# Patient Record
Sex: FEMALE | Race: WHITE | NOT HISPANIC OR LATINO | Employment: PART TIME | URBAN - NONMETROPOLITAN AREA
[De-identification: names, ages, dates, MRNs, and addresses within clinical notes are randomized per-mention and may not be internally consistent; named-entity substitution may affect disease eponyms.]

---

## 2017-03-06 ENCOUNTER — IMAGING SERVICES (OUTPATIENT)
Dept: MAMMOGRAPHY | Age: 47
End: 2017-03-06
Attending: SURGERY

## 2017-03-06 DIAGNOSIS — N63.10 BREAST MASS, RIGHT: ICD-10-CM

## 2017-03-06 PROCEDURE — 77065 DX MAMMO INCL CAD UNI: CPT | Performed by: RADIOLOGY

## 2017-03-14 ENCOUNTER — OFFICE VISIT (OUTPATIENT)
Dept: SURGERY | Age: 47
End: 2017-03-14

## 2017-03-14 VITALS
DIASTOLIC BLOOD PRESSURE: 64 MMHG | WEIGHT: 127.8 LBS | HEIGHT: 60 IN | BODY MASS INDEX: 25.09 KG/M2 | SYSTOLIC BLOOD PRESSURE: 108 MMHG | HEART RATE: 76 BPM | TEMPERATURE: 97.9 F

## 2017-03-14 DIAGNOSIS — N63.10 BREAST MASS, RIGHT: Primary | ICD-10-CM

## 2017-03-14 DIAGNOSIS — Z12.39 SCREENING FOR BREAST CANCER: ICD-10-CM

## 2017-03-14 PROCEDURE — 99212 OFFICE O/P EST SF 10 MIN: CPT | Performed by: SURGERY

## 2017-04-19 ENCOUNTER — TELEPHONE (OUTPATIENT)
Dept: SURGERY | Age: 47
End: 2017-04-19

## 2017-07-03 ENCOUNTER — TELEPHONE (OUTPATIENT)
Dept: SURGERY | Age: 47
End: 2017-07-03

## 2017-09-04 ENCOUNTER — NURSE TRIAGE (OUTPATIENT)
Dept: TELEHEALTH | Age: 47
End: 2017-09-04

## 2017-09-05 ENCOUNTER — HOSPITAL ENCOUNTER (EMERGENCY)
Age: 47
Discharge: HOME | End: 2017-09-05
Attending: EMERGENCY MEDICINE

## 2017-09-05 ENCOUNTER — APPOINTMENT (OUTPATIENT)
Dept: GENERAL RADIOLOGY | Age: 47
End: 2017-09-05
Attending: EMERGENCY MEDICINE

## 2017-09-05 ENCOUNTER — OFF PREMISE CHARGES (OUTPATIENT)
Dept: INTERNAL MEDICINE | Age: 47
End: 2017-09-05

## 2017-09-05 ENCOUNTER — APPOINTMENT (OUTPATIENT)
Dept: CT IMAGING | Age: 47
End: 2017-09-05
Attending: EMERGENCY MEDICINE

## 2017-09-05 VITALS
DIASTOLIC BLOOD PRESSURE: 60 MMHG | WEIGHT: 132.28 LBS | SYSTOLIC BLOOD PRESSURE: 99 MMHG | TEMPERATURE: 98.4 F | HEIGHT: 60 IN | RESPIRATION RATE: 16 BRPM | HEART RATE: 78 BPM | BODY MASS INDEX: 25.97 KG/M2 | OXYGEN SATURATION: 96 %

## 2017-09-05 DIAGNOSIS — R07.9 CHEST PAIN IN ADULT: Primary | ICD-10-CM

## 2017-09-05 DIAGNOSIS — R07.9 CHEST PAIN, UNSPECIFIED TYPE: Primary | ICD-10-CM

## 2017-09-05 DIAGNOSIS — K92.0 HEMATEMESIS WITH NAUSEA: ICD-10-CM

## 2017-09-05 DIAGNOSIS — K29.01 ACUTE GASTRITIS WITH HEMORRHAGE, UNSPECIFIED GASTRITIS TYPE: ICD-10-CM

## 2017-09-05 LAB
ALBUMIN SERPL-MCNC: 4 G/DL (ref 3.6–5.1)
ALBUMIN/GLOB SERPL: 1.2 {RATIO} (ref 1–2.4)
ALP SERPL-CCNC: 84 UNITS/L (ref 45–117)
ALT SERPL-CCNC: 42 UNITS/L
ANION GAP SERPL CALC-SCNC: 12 MMOL/L (ref 10–20)
AST SERPL-CCNC: 22 UNITS/L
BASOPHILS # BLD AUTO: 0 K/MCL (ref 0–0.3)
BASOPHILS NFR BLD AUTO: 0 %
BILIRUB SERPL-MCNC: 0.3 MG/DL (ref 0.2–1)
BUN SERPL-MCNC: 8 MG/DL (ref 6–20)
BUN/CREAT SERPL: 14 (ref 7–25)
CALCIUM SERPL-MCNC: 8.7 MG/DL (ref 8.4–10.2)
CHLORIDE SERPL-SCNC: 101 MMOL/L (ref 98–107)
CO2 SERPL-SCNC: 28 MMOL/L (ref 21–32)
CREAT SERPL-MCNC: 0.59 MG/DL (ref 0.51–0.95)
CRP SERPL-MCNC: <0.3 MG/DL
D DIMER PPP FEU-MCNC: 0.85 MG/L (FEU)
DIFFERENTIAL METHOD BLD: ABNORMAL
EOSINOPHIL # BLD AUTO: 0.1 K/MCL (ref 0.1–0.5)
EOSINOPHIL NFR SPEC: 1 %
ERYTHROCYTE [DISTWIDTH] IN BLOOD: 14.9 % (ref 11–15)
ERYTHROCYTE [SEDIMENTATION RATE] IN BLOOD: 31 MM/HR (ref 0–20)
GLOBULIN SER-MCNC: 3.3 G/DL (ref 2–4)
GLUCOSE SERPL-MCNC: 88 MG/DL (ref 65–99)
HCT VFR BLD CALC: 35.2 % (ref 36–46.5)
HGB BLD-MCNC: 12 G/DL (ref 12–15.5)
INR PPP: 1
LIPASE SERPL-CCNC: 291 UNITS/L (ref 73–393)
LYMPHOCYTES # BLD MANUAL: 1.6 K/MCL (ref 1–4.8)
LYMPHOCYTES NFR BLD MANUAL: 19 %
MCH RBC QN AUTO: 30.9 PG (ref 26–34)
MCHC RBC AUTO-ENTMCNC: 34.1 G/DL (ref 32–36.5)
MCV RBC AUTO: 90.7 FL (ref 78–100)
MONOCYTES # BLD MANUAL: 0.5 K/MCL (ref 0.3–0.9)
MONOCYTES NFR BLD MANUAL: 6 %
NEUTROPHILS # BLD: 6.1 K/MCL (ref 1.8–7.7)
NEUTROPHILS NFR BLD AUTO: 74 %
PLATELET # BLD: 314 K/MCL (ref 140–450)
POTASSIUM SERPL-SCNC: 3.8 MMOL/L (ref 3.4–5.1)
PROT SERPL-MCNC: 7.3 G/DL (ref 6.4–8.2)
PROTHROMBIN TIME: 10.7 SEC (ref 9.7–11.8)
RBC # BLD: 3.88 MIL/MCL (ref 4–5.2)
SODIUM SERPL-SCNC: 137 MMOL/L (ref 135–145)
TROPONIN I SERPL-MCNC: <0.02 NG/ML
WBC # BLD: 8.2 K/MCL (ref 4.2–11)

## 2017-09-05 PROCEDURE — 99285 EMERGENCY DEPT VISIT HI MDM: CPT | Performed by: EMERGENCY MEDICINE

## 2017-09-05 PROCEDURE — 85379 FIBRIN DEGRADATION QUANT: CPT

## 2017-09-05 PROCEDURE — 10002800 HB RX 250 W HCPCS: Performed by: EMERGENCY MEDICINE

## 2017-09-05 PROCEDURE — 10004651 HB RX, NO CHARGE ITEM: Performed by: EMERGENCY MEDICINE

## 2017-09-05 PROCEDURE — 83690 ASSAY OF LIPASE: CPT

## 2017-09-05 PROCEDURE — 85025 COMPLETE CBC W/AUTO DIFF WBC: CPT

## 2017-09-05 PROCEDURE — 71275 CT ANGIOGRAPHY CHEST: CPT | Performed by: RADIOLOGY

## 2017-09-05 PROCEDURE — 96376 TX/PRO/DX INJ SAME DRUG ADON: CPT

## 2017-09-05 PROCEDURE — 85610 PROTHROMBIN TIME: CPT

## 2017-09-05 PROCEDURE — 36415 COLL VENOUS BLD VENIPUNCTURE: CPT

## 2017-09-05 PROCEDURE — 80053 COMPREHEN METABOLIC PANEL: CPT

## 2017-09-05 PROCEDURE — 10002807 HB RX 258: Performed by: EMERGENCY MEDICINE

## 2017-09-05 PROCEDURE — 84484 ASSAY OF TROPONIN QUANT: CPT

## 2017-09-05 PROCEDURE — C9113 INJ PANTOPRAZOLE SODIUM, VIA: HCPCS | Performed by: EMERGENCY MEDICINE

## 2017-09-05 PROCEDURE — 93010 ELECTROCARDIOGRAM REPORT: CPT | Performed by: INTERNAL MEDICINE

## 2017-09-05 PROCEDURE — 99285 EMERGENCY DEPT VISIT HI MDM: CPT

## 2017-09-05 PROCEDURE — 10002803 HB RX 637: Performed by: EMERGENCY MEDICINE

## 2017-09-05 PROCEDURE — 93005 ELECTROCARDIOGRAM TRACING: CPT | Performed by: EMERGENCY MEDICINE

## 2017-09-05 PROCEDURE — 10002801 HB RX 250 W/O HCPCS: Performed by: EMERGENCY MEDICINE

## 2017-09-05 PROCEDURE — 85652 RBC SED RATE AUTOMATED: CPT

## 2017-09-05 PROCEDURE — 71275 CT ANGIOGRAPHY CHEST: CPT

## 2017-09-05 PROCEDURE — 10002805 HB CONTRAST AGENT: Performed by: EMERGENCY MEDICINE

## 2017-09-05 PROCEDURE — 96375 TX/PRO/DX INJ NEW DRUG ADDON: CPT

## 2017-09-05 PROCEDURE — 86140 C-REACTIVE PROTEIN: CPT

## 2017-09-05 RX ORDER — MAGNESIUM HYDROXIDE/ALUMINUM HYDROXICE/SIMETHICONE 120; 1200; 1200 MG/30ML; MG/30ML; MG/30ML
30 SUSPENSION ORAL ONCE
Status: COMPLETED | OUTPATIENT
Start: 2017-09-05 | End: 2017-09-05

## 2017-09-05 RX ORDER — ONDANSETRON 2 MG/ML
4 INJECTION INTRAMUSCULAR; INTRAVENOUS ONCE
Status: COMPLETED | OUTPATIENT
Start: 2017-09-05 | End: 2017-09-05

## 2017-09-05 RX ORDER — PANTOPRAZOLE SODIUM 40 MG/1
40 TABLET, DELAYED RELEASE ORAL DAILY
Qty: 30 TABLET | Refills: 0 | Status: SHIPPED | OUTPATIENT
Start: 2017-09-05 | End: 2021-02-10 | Stop reason: ALTCHOICE

## 2017-09-05 RX ORDER — PANTOPRAZOLE SODIUM 40 MG/10ML
40 INJECTION, POWDER, LYOPHILIZED, FOR SOLUTION INTRAVENOUS ONCE
Status: COMPLETED | OUTPATIENT
Start: 2017-09-05 | End: 2017-09-05

## 2017-09-05 RX ORDER — ONDANSETRON 4 MG/1
4 TABLET, ORALLY DISINTEGRATING ORAL EVERY 4 HOURS PRN
Qty: 10 TABLET | Refills: 0 | Status: SHIPPED | OUTPATIENT
Start: 2017-09-05 | End: 2021-02-10 | Stop reason: ALTCHOICE

## 2017-09-05 RX ORDER — LORAZEPAM 2 MG/ML
0.5 INJECTION INTRAMUSCULAR ONCE
Status: COMPLETED | OUTPATIENT
Start: 2017-09-05 | End: 2017-09-05

## 2017-09-05 RX ORDER — HYDROCODONE BITARTRATE AND ACETAMINOPHEN 5; 325 MG/1; MG/1
1-2 TABLET ORAL EVERY 4 HOURS PRN
Qty: 12 TABLET | Refills: 0 | Status: SHIPPED | OUTPATIENT
Start: 2017-09-05 | End: 2021-02-10 | Stop reason: ALTCHOICE

## 2017-09-05 RX ADMIN — PANTOPRAZOLE SODIUM 40 MG: 40 INJECTION, POWDER, FOR SOLUTION INTRAVENOUS at 09:47

## 2017-09-05 RX ADMIN — MORPHINE SULFATE 2 MG: 2 INJECTION, SOLUTION INTRAMUSCULAR; INTRAVENOUS at 14:09

## 2017-09-05 RX ADMIN — IOPAMIDOL 75 ML: 755 INJECTION, SOLUTION INTRAVENOUS at 10:19

## 2017-09-05 RX ADMIN — LIDOCAINE HYDROCHLORIDE 20 ML: 20 SOLUTION ORAL; TOPICAL at 09:53

## 2017-09-05 RX ADMIN — LORAZEPAM 0.5 MG: 2 INJECTION INTRAMUSCULAR; INTRAVENOUS at 11:49

## 2017-09-05 RX ADMIN — MORPHINE SULFATE 4 MG: 4 INJECTION, SOLUTION INTRAMUSCULAR; INTRAVENOUS at 11:47

## 2017-09-05 RX ADMIN — ALUMINUM HYDROXIDE, MAGNESIUM HYDROXIDE, AND SIMETHICONE 30 ML: 200; 200; 20 SUSPENSION ORAL at 09:49

## 2017-09-05 RX ADMIN — SODIUM CHLORIDE 1000 ML: 9 INJECTION, SOLUTION INTRAVENOUS at 10:50

## 2017-09-05 RX ADMIN — ONDANSETRON 4 MG: 2 SOLUTION INTRAMUSCULAR; INTRAVENOUS at 09:45

## 2017-09-05 RX ADMIN — SODIUM CHLORIDE 10 ML: 9 INJECTION INTRAMUSCULAR; INTRAVENOUS; SUBCUTANEOUS at 10:34

## 2017-09-05 RX ADMIN — SODIUM CHLORIDE 50 ML: 9 INJECTION, SOLUTION INTRAVENOUS at 10:19

## 2017-09-05 ASSESSMENT — ENCOUNTER SYMPTOMS
BACK PAIN: 0
SHORTNESS OF BREATH: 1
NAUSEA: 1
DIZZINESS: 0
DIARRHEA: 0
ACTIVITY CHANGE: 0
CONFUSION: 0
ROS GI COMMENTS: HEMATEMESIS
FATIGUE: 0
WEAKNESS: 0
SORE THROAT: 0
FEVER: 0
EYE PAIN: 0
SPEECH DIFFICULTY: 0
APPETITE CHANGE: 0
HEADACHES: 0
CHEST TIGHTNESS: 1
EYE REDNESS: 0
WOUND: 0
COUGH: 0
CHILLS: 0
ABDOMINAL PAIN: 0
VOMITING: 1

## 2017-09-05 ASSESSMENT — HEART SCORE
TROPONIN: EQUAL OR LESS THAN NORMAL LIMIT
HEART SCORE: 1
AGE: GREATER THAN 45 TO LESS THAN 65
HISTORY: SLIGHTLY SUSPICIOUS
EKG: NORMAL
RISK FACTORS: NO RISK FACTORS KNOWN

## 2017-09-05 ASSESSMENT — PAIN SCALES - GENERAL
PAINLEVEL_OUTOF10: 5
PAINLEVEL_OUTOF10: 4

## 2017-09-06 ENCOUNTER — PREP FOR CASE (OUTPATIENT)
Dept: GASTROENTEROLOGY | Age: 47
End: 2017-09-06

## 2017-09-06 DIAGNOSIS — K29.01 ACUTE GASTRITIS WITH HEMORRHAGE, UNSPECIFIED GASTRITIS TYPE: Primary | ICD-10-CM

## 2017-09-06 LAB
ATRIAL RATE (BPM): 76
P AXIS (DEGREES): 67
PR-INTERVAL (MSEC): 144
QRS-INTERVAL (MSEC): 82
QT-INTERVAL (MSEC): 372
QTC: 418
R AXIS (DEGREES): 30
REPORT TEXT: NORMAL
T AXIS (DEGREES): 57
VENTRICULAR RATE EKG/MIN (BPM): 76

## 2017-09-07 ENCOUNTER — PREP FOR CASE (OUTPATIENT)
Dept: GASTROENTEROLOGY | Age: 47
End: 2017-09-07

## 2017-09-07 ENCOUNTER — TELEPHONE (OUTPATIENT)
Dept: FAMILY MEDICINE | Age: 47
End: 2017-09-07

## 2017-09-07 DIAGNOSIS — K29.00 ACUTE GASTRITIS WITHOUT HEMORRHAGE, UNSPECIFIED GASTRITIS TYPE: Primary | ICD-10-CM

## 2017-09-07 RX ORDER — CALCIUM CARBONATE 500 MG/1
1 TABLET, CHEWABLE ORAL DAILY
COMMUNITY
End: 2021-02-10 | Stop reason: ALTCHOICE

## 2017-09-07 RX ORDER — 0.9 % SODIUM CHLORIDE 0.9 %
2 VIAL (ML) INJECTION PRN
Status: CANCELLED | OUTPATIENT
Start: 2017-09-07

## 2017-09-07 RX ORDER — 0.9 % SODIUM CHLORIDE 0.9 %
2 VIAL (ML) INJECTION EVERY 12 HOURS SCHEDULED
Status: CANCELLED | OUTPATIENT
Start: 2017-09-07

## 2017-09-07 RX ORDER — SODIUM CHLORIDE 9 MG/ML
INJECTION, SOLUTION INTRAVENOUS CONTINUOUS
Status: CANCELLED | OUTPATIENT
Start: 2017-09-07

## 2017-09-07 ASSESSMENT — ACTIVITIES OF DAILY LIVING (ADL)
RECENT_DECLINE_ADL: NO
HISTORY OF FALLING IN THE LAST YEAR (PRIOR TO ADMISSION): NO
CHRONIC_PAIN_PRESENT: NO
SENSORY_SUPPORT_DEVICES: CONTACTS;EYEGLASSES
ADL_SCORE: 12
NEEDS_ASSIST: NO
ADL_SHORT_OF_BREATH: NO
ADL_BEFORE_ADMISSION: INDEPENDENT

## 2017-09-07 ASSESSMENT — COGNITIVE AND FUNCTIONAL STATUS - GENERAL
ARE YOU BLIND OR DO YOU HAVE SERIOUS DIFFICULTY SEEING, EVEN WHEN WEARING GLASSES: NO
ARE YOU DEAF OR DO YOU HAVE SERIOUS DIFFICULTY  HEARING: NO

## 2017-09-08 ENCOUNTER — OFF PREMISE (OUTPATIENT)
Dept: GASTROENTEROLOGY | Age: 47
End: 2017-09-08

## 2017-09-08 ENCOUNTER — HOSPITAL ENCOUNTER (OUTPATIENT)
Age: 47
Discharge: HOME | End: 2017-09-08
Attending: INTERNAL MEDICINE | Admitting: INTERNAL MEDICINE

## 2017-09-08 ENCOUNTER — SURGERY (OUTPATIENT)
Age: 47
End: 2017-09-08

## 2017-09-08 VITALS
RESPIRATION RATE: 14 BRPM | TEMPERATURE: 97.4 F | WEIGHT: 130.29 LBS | SYSTOLIC BLOOD PRESSURE: 120 MMHG | HEIGHT: 60 IN | DIASTOLIC BLOOD PRESSURE: 61 MMHG | OXYGEN SATURATION: 100 % | BODY MASS INDEX: 25.58 KG/M2 | HEART RATE: 62 BPM

## 2017-09-08 DIAGNOSIS — K29.00 ACUTE GASTRITIS WITHOUT HEMORRHAGE, UNSPECIFIED GASTRITIS TYPE: ICD-10-CM

## 2017-09-08 DIAGNOSIS — K31.819 GASTRIC AVM: ICD-10-CM

## 2017-09-08 DIAGNOSIS — K92.0 HEMATEMESIS WITH NAUSEA: Primary | ICD-10-CM

## 2017-09-08 PROCEDURE — 10004560 HB COUNTER-EXTENDED RECOVERY PER HOUR

## 2017-09-08 PROCEDURE — 10003057 HB DISPOSABLE INSTRUMENT/SUPPLY 2: Performed by: INTERNAL MEDICINE

## 2017-09-08 PROCEDURE — 10004316 HB COUNTER-PREP

## 2017-09-08 PROCEDURE — 10003436 HB UPPER GI ENDOSCOPY: Performed by: INTERNAL MEDICINE

## 2017-09-08 PROCEDURE — 10002807 HB RX 258: Performed by: INTERNAL MEDICINE

## 2017-09-08 PROCEDURE — 99152 MOD SED SAME PHYS/QHP 5/>YRS: CPT | Performed by: INTERNAL MEDICINE

## 2017-09-08 PROCEDURE — 10004348 HB COUNTER-EVAL PRE-OP

## 2017-09-08 PROCEDURE — 44366 SMALL BOWEL ENDOSCOPY: CPT | Performed by: INTERNAL MEDICINE

## 2017-09-08 PROCEDURE — 10002800 HB RX 250 W HCPCS: Performed by: INTERNAL MEDICINE

## 2017-09-08 RX ORDER — SODIUM CHLORIDE 9 MG/ML
INJECTION, SOLUTION INTRAVENOUS CONTINUOUS
Status: DISCONTINUED | OUTPATIENT
Start: 2017-09-08 | End: 2017-09-08 | Stop reason: HOSPADM

## 2017-09-08 RX ORDER — 0.9 % SODIUM CHLORIDE 0.9 %
2 VIAL (ML) INJECTION EVERY 12 HOURS SCHEDULED
Status: DISCONTINUED | OUTPATIENT
Start: 2017-09-08 | End: 2017-09-08 | Stop reason: HOSPADM

## 2017-09-08 RX ORDER — 0.9 % SODIUM CHLORIDE 0.9 %
2 VIAL (ML) INJECTION PRN
Status: DISCONTINUED | OUTPATIENT
Start: 2017-09-08 | End: 2017-09-08 | Stop reason: HOSPADM

## 2017-09-08 RX ORDER — MIDAZOLAM HYDROCHLORIDE 1 MG/ML
INJECTION, SOLUTION INTRAMUSCULAR; INTRAVENOUS PRN
Status: DISCONTINUED | OUTPATIENT
Start: 2017-09-08 | End: 2017-09-08 | Stop reason: HOSPADM

## 2017-09-08 RX ADMIN — FENTANYL CITRATE 25 MCG: 50 INJECTION INTRAMUSCULAR; INTRAVENOUS at 14:13

## 2017-09-08 RX ADMIN — SODIUM CHLORIDE: 9 INJECTION, SOLUTION INTRAVENOUS at 12:40

## 2017-09-08 RX ADMIN — FENTANYL CITRATE 50 MCG: 50 INJECTION INTRAMUSCULAR; INTRAVENOUS at 14:11

## 2017-09-08 RX ADMIN — FENTANYL CITRATE 25 MCG: 50 INJECTION INTRAMUSCULAR; INTRAVENOUS at 14:15

## 2017-09-08 RX ADMIN — MIDAZOLAM HYDROCHLORIDE 1 MG: 1 INJECTION, SOLUTION INTRAMUSCULAR; INTRAVENOUS at 14:15

## 2017-09-08 RX ADMIN — MIDAZOLAM HYDROCHLORIDE 2 MG: 1 INJECTION, SOLUTION INTRAMUSCULAR; INTRAVENOUS at 14:20

## 2017-09-08 RX ADMIN — MIDAZOLAM HYDROCHLORIDE 2 MG: 1 INJECTION, SOLUTION INTRAMUSCULAR; INTRAVENOUS at 14:13

## 2017-09-08 RX ADMIN — MIDAZOLAM HYDROCHLORIDE 2 MG: 1 INJECTION, SOLUTION INTRAMUSCULAR; INTRAVENOUS at 14:11

## 2017-09-08 ASSESSMENT — PAIN SCALES - GENERAL
PAIN_LEVEL_AT_REST: 0
PAIN_LEVEL_AT_REST: 2
PAIN_LEVEL_AT_REST: 0
PAIN_LEVEL_AT_REST: 3
PAIN_LEVEL_AT_REST: 0

## 2017-09-08 ASSESSMENT — LIFESTYLE VARIABLES: SMOKING_HISTORY: NO

## 2018-02-03 ENCOUNTER — APPOINTMENT (OUTPATIENT)
Dept: MAMMOGRAPHY | Facility: CLINIC | Age: 48
End: 2018-02-03

## 2018-02-27 ENCOUNTER — RESULT REVIEW (OUTPATIENT)
Age: 48
End: 2018-02-27

## 2018-03-10 ENCOUNTER — APPOINTMENT (OUTPATIENT)
Dept: MAMMOGRAPHY | Facility: CLINIC | Age: 48
End: 2018-03-10
Payer: COMMERCIAL

## 2018-03-10 ENCOUNTER — OUTPATIENT (OUTPATIENT)
Dept: OUTPATIENT SERVICES | Facility: HOSPITAL | Age: 48
LOS: 1 days | End: 2018-03-10
Payer: COMMERCIAL

## 2018-03-10 DIAGNOSIS — Z00.8 ENCOUNTER FOR OTHER GENERAL EXAMINATION: ICD-10-CM

## 2018-03-10 PROCEDURE — 77063 BREAST TOMOSYNTHESIS BI: CPT | Mod: 26

## 2018-03-10 PROCEDURE — 77067 SCR MAMMO BI INCL CAD: CPT

## 2018-03-10 PROCEDURE — 77063 BREAST TOMOSYNTHESIS BI: CPT

## 2018-03-10 PROCEDURE — 77067 SCR MAMMO BI INCL CAD: CPT | Mod: 26

## 2018-04-11 ENCOUNTER — OUTPATIENT (OUTPATIENT)
Dept: OUTPATIENT SERVICES | Facility: HOSPITAL | Age: 48
LOS: 1 days | End: 2018-04-11
Payer: COMMERCIAL

## 2018-04-11 ENCOUNTER — APPOINTMENT (OUTPATIENT)
Dept: ULTRASOUND IMAGING | Facility: CLINIC | Age: 48
End: 2018-04-11
Payer: SELF-PAY

## 2018-04-11 DIAGNOSIS — Z00.8 ENCOUNTER FOR OTHER GENERAL EXAMINATION: ICD-10-CM

## 2018-04-11 PROCEDURE — 76641 ULTRASOUND BREAST COMPLETE: CPT

## 2018-04-11 PROCEDURE — 76641 ULTRASOUND BREAST COMPLETE: CPT | Mod: 26,50

## 2018-04-20 ENCOUNTER — RESULT REVIEW (OUTPATIENT)
Age: 48
End: 2018-04-20

## 2018-04-20 ENCOUNTER — OUTPATIENT (OUTPATIENT)
Dept: OUTPATIENT SERVICES | Facility: HOSPITAL | Age: 48
LOS: 1 days | End: 2018-04-20
Payer: COMMERCIAL

## 2018-04-20 ENCOUNTER — APPOINTMENT (OUTPATIENT)
Dept: ULTRASOUND IMAGING | Facility: CLINIC | Age: 48
End: 2018-04-20
Payer: SELF-PAY

## 2018-04-20 DIAGNOSIS — Z00.8 ENCOUNTER FOR OTHER GENERAL EXAMINATION: ICD-10-CM

## 2018-04-20 PROCEDURE — 19083 BX BREAST 1ST LESION US IMAG: CPT | Mod: RT

## 2018-04-20 PROCEDURE — 19083 BX BREAST 1ST LESION US IMAG: CPT

## 2018-04-20 PROCEDURE — 77065 DX MAMMO INCL CAD UNI: CPT

## 2018-04-20 PROCEDURE — 88377 M/PHMTRC ALYS ISHQUANT/SEMIQ: CPT | Mod: 26

## 2018-04-20 PROCEDURE — 77065 DX MAMMO INCL CAD UNI: CPT | Mod: 26,RT

## 2018-04-20 PROCEDURE — 88305 TISSUE EXAM BY PATHOLOGIST: CPT | Mod: 26

## 2018-04-20 PROCEDURE — 88360 TUMOR IMMUNOHISTOCHEM/MANUAL: CPT | Mod: 26

## 2018-04-20 PROCEDURE — A4648: CPT

## 2018-04-20 PROCEDURE — 88342 IMHCHEM/IMCYTCHM 1ST ANTB: CPT | Mod: 26,59

## 2018-04-20 PROCEDURE — 88377 M/PHMTRC ALYS ISHQUANT/SEMIQ: CPT

## 2018-04-23 LAB — SURGICAL PATHOLOGY FINAL REPORT - CH: SIGNIFICANT CHANGE UP

## 2018-05-09 ENCOUNTER — APPOINTMENT (OUTPATIENT)
Dept: SURGICAL ONCOLOGY | Facility: CLINIC | Age: 48
End: 2018-05-09
Payer: SELF-PAY

## 2018-05-09 VITALS
BODY MASS INDEX: 33.66 KG/M2 | OXYGEN SATURATION: 95 % | DIASTOLIC BLOOD PRESSURE: 78 MMHG | HEART RATE: 64 BPM | WEIGHT: 202 LBS | SYSTOLIC BLOOD PRESSURE: 115 MMHG | HEIGHT: 65 IN | RESPIRATION RATE: 16 BRPM

## 2018-05-09 DIAGNOSIS — Z78.9 OTHER SPECIFIED HEALTH STATUS: ICD-10-CM

## 2018-05-09 PROCEDURE — 99245 OFF/OP CONSLTJ NEW/EST HI 55: CPT

## 2018-05-22 ENCOUNTER — OUTPATIENT (OUTPATIENT)
Dept: OUTPATIENT SERVICES | Facility: HOSPITAL | Age: 48
LOS: 1 days | End: 2018-05-22
Payer: SELF-PAY

## 2018-05-22 DIAGNOSIS — C50.911 MALIGNANT NEOPLASM OF UNSPECIFIED SITE OF RIGHT FEMALE BREAST: ICD-10-CM

## 2018-05-22 DIAGNOSIS — I10 ESSENTIAL (PRIMARY) HYPERTENSION: ICD-10-CM

## 2018-05-22 DIAGNOSIS — Z01.818 ENCOUNTER FOR OTHER PREPROCEDURAL EXAMINATION: ICD-10-CM

## 2018-05-22 PROCEDURE — 93010 ELECTROCARDIOGRAM REPORT: CPT | Mod: NC

## 2018-05-23 ENCOUNTER — RESULT REVIEW (OUTPATIENT)
Age: 48
End: 2018-05-23

## 2018-05-23 PROCEDURE — 36415 COLL VENOUS BLD VENIPUNCTURE: CPT

## 2018-05-23 PROCEDURE — G0463: CPT

## 2018-05-23 PROCEDURE — 93005 ELECTROCARDIOGRAM TRACING: CPT

## 2018-05-23 PROCEDURE — 80048 BASIC METABOLIC PNL TOTAL CA: CPT

## 2018-05-23 PROCEDURE — 85027 COMPLETE CBC AUTOMATED: CPT

## 2018-05-31 ENCOUNTER — APPOINTMENT (OUTPATIENT)
Dept: CARDIOLOGY | Facility: HOSPITAL | Age: 48
End: 2018-05-31

## 2018-05-31 ENCOUNTER — FORM ENCOUNTER (OUTPATIENT)
Age: 48
End: 2018-05-31

## 2018-06-01 ENCOUNTER — OUTPATIENT (OUTPATIENT)
Dept: OUTPATIENT SERVICES | Facility: HOSPITAL | Age: 48
LOS: 1 days | End: 2018-06-01
Payer: COMMERCIAL

## 2018-06-01 ENCOUNTER — APPOINTMENT (OUTPATIENT)
Dept: MRI IMAGING | Facility: CLINIC | Age: 48
End: 2018-06-01
Payer: COMMERCIAL

## 2018-06-01 DIAGNOSIS — C50.911 MALIGNANT NEOPLASM OF UNSPECIFIED SITE OF RIGHT FEMALE BREAST: ICD-10-CM

## 2018-06-01 PROCEDURE — A9585: CPT

## 2018-06-01 PROCEDURE — C8908: CPT

## 2018-06-01 PROCEDURE — C8937: CPT

## 2018-06-01 PROCEDURE — 77059 MRI BREAST BILATERAL: CPT | Mod: 26

## 2018-06-01 PROCEDURE — 0159T: CPT | Mod: 26

## 2018-06-05 ENCOUNTER — APPOINTMENT (OUTPATIENT)
Dept: SURGICAL ONCOLOGY | Facility: HOSPITAL | Age: 48
End: 2018-06-05

## 2018-06-07 ENCOUNTER — OUTPATIENT (OUTPATIENT)
Dept: OUTPATIENT SERVICES | Facility: HOSPITAL | Age: 48
LOS: 1 days | End: 2018-06-07
Payer: SELF-PAY

## 2018-06-07 ENCOUNTER — NON-APPOINTMENT (OUTPATIENT)
Age: 48
End: 2018-06-07

## 2018-06-07 ENCOUNTER — APPOINTMENT (OUTPATIENT)
Dept: CARDIOLOGY | Facility: HOSPITAL | Age: 48
End: 2018-06-07

## 2018-06-07 VITALS
HEIGHT: 65 IN | OXYGEN SATURATION: 96 % | WEIGHT: 198 LBS | SYSTOLIC BLOOD PRESSURE: 115 MMHG | HEART RATE: 69 BPM | DIASTOLIC BLOOD PRESSURE: 77 MMHG | BODY MASS INDEX: 32.99 KG/M2

## 2018-06-07 DIAGNOSIS — I25.10 ATHEROSCLEROTIC HEART DISEASE OF NATIVE CORONARY ARTERY WITHOUT ANGINA PECTORIS: ICD-10-CM

## 2018-06-07 DIAGNOSIS — Z01.818 ENCOUNTER FOR OTHER PREPROCEDURAL EXAMINATION: ICD-10-CM

## 2018-06-07 PROCEDURE — 93005 ELECTROCARDIOGRAM TRACING: CPT

## 2018-06-07 PROCEDURE — G0463: CPT

## 2018-07-15 ENCOUNTER — FORM ENCOUNTER (OUTPATIENT)
Age: 48
End: 2018-07-15

## 2018-07-16 ENCOUNTER — APPOINTMENT (OUTPATIENT)
Dept: ULTRASOUND IMAGING | Facility: IMAGING CENTER | Age: 48
End: 2018-07-16
Payer: COMMERCIAL

## 2018-07-16 ENCOUNTER — FORM ENCOUNTER (OUTPATIENT)
Age: 48
End: 2018-07-16

## 2018-07-16 ENCOUNTER — OUTPATIENT (OUTPATIENT)
Dept: OUTPATIENT SERVICES | Facility: HOSPITAL | Age: 48
LOS: 1 days | End: 2018-07-16
Payer: SELF-PAY

## 2018-07-16 VITALS
HEIGHT: 63 IN | OXYGEN SATURATION: 97 % | WEIGHT: 207.68 LBS | RESPIRATION RATE: 14 BRPM | TEMPERATURE: 99 F | SYSTOLIC BLOOD PRESSURE: 122 MMHG | DIASTOLIC BLOOD PRESSURE: 80 MMHG | HEART RATE: 66 BPM

## 2018-07-16 VITALS — WEIGHT: 207.68 LBS | HEIGHT: 63 IN

## 2018-07-16 DIAGNOSIS — Z01.818 ENCOUNTER FOR OTHER PREPROCEDURAL EXAMINATION: ICD-10-CM

## 2018-07-16 DIAGNOSIS — C50.911 MALIGNANT NEOPLASM OF UNSPECIFIED SITE OF RIGHT FEMALE BREAST: ICD-10-CM

## 2018-07-16 DIAGNOSIS — Z98.890 OTHER SPECIFIED POSTPROCEDURAL STATES: Chronic | ICD-10-CM

## 2018-07-16 DIAGNOSIS — R92.8 OTHER ABNORMAL AND INCONCLUSIVE FINDINGS ON DIAGNOSTIC IMAGING OF BREAST: ICD-10-CM

## 2018-07-16 DIAGNOSIS — Z98.51 TUBAL LIGATION STATUS: Chronic | ICD-10-CM

## 2018-07-16 LAB
ALBUMIN SERPL ELPH-MCNC: 3.2 G/DL — LOW (ref 3.3–5)
ALP SERPL-CCNC: 102 U/L — SIGNIFICANT CHANGE UP (ref 40–120)
ALT FLD-CCNC: 14 U/L DA — SIGNIFICANT CHANGE UP (ref 10–45)
ANION GAP SERPL CALC-SCNC: 9 MMOL/L — SIGNIFICANT CHANGE UP (ref 5–17)
AST SERPL-CCNC: 14 U/L — SIGNIFICANT CHANGE UP (ref 10–40)
BILIRUB SERPL-MCNC: 0.3 MG/DL — SIGNIFICANT CHANGE UP (ref 0.2–1.2)
BUN SERPL-MCNC: 13 MG/DL — SIGNIFICANT CHANGE UP (ref 7–23)
CALCIUM SERPL-MCNC: 8.8 MG/DL — SIGNIFICANT CHANGE UP (ref 8.4–10.5)
CHLORIDE SERPL-SCNC: 103 MMOL/L — SIGNIFICANT CHANGE UP (ref 96–108)
CO2 SERPL-SCNC: 27 MMOL/L — SIGNIFICANT CHANGE UP (ref 22–31)
CREAT SERPL-MCNC: 0.62 MG/DL — SIGNIFICANT CHANGE UP (ref 0.5–1.3)
GLUCOSE SERPL-MCNC: 88 MG/DL — SIGNIFICANT CHANGE UP (ref 70–99)
HCT VFR BLD CALC: 49.4 % — HIGH (ref 34.5–45)
HGB BLD-MCNC: 16.3 G/DL — HIGH (ref 11.5–15.5)
MCHC RBC-ENTMCNC: 30.2 PG — SIGNIFICANT CHANGE UP (ref 27–34)
MCHC RBC-ENTMCNC: 32.9 GM/DL — SIGNIFICANT CHANGE UP (ref 32–36)
MCV RBC AUTO: 91.9 FL — SIGNIFICANT CHANGE UP (ref 80–100)
PLATELET # BLD AUTO: 327 K/UL — SIGNIFICANT CHANGE UP (ref 150–400)
POTASSIUM SERPL-MCNC: 4 MMOL/L — SIGNIFICANT CHANGE UP (ref 3.5–5.3)
POTASSIUM SERPL-SCNC: 4 MMOL/L — SIGNIFICANT CHANGE UP (ref 3.5–5.3)
PROT SERPL-MCNC: 7.3 G/DL — SIGNIFICANT CHANGE UP (ref 6–8.3)
RBC # BLD: 5.38 M/UL — HIGH (ref 3.8–5.2)
RBC # FLD: 13.6 % — SIGNIFICANT CHANGE UP (ref 10.3–14.5)
SODIUM SERPL-SCNC: 139 MMOL/L — SIGNIFICANT CHANGE UP (ref 135–145)
WBC # BLD: 19 K/UL — HIGH (ref 3.8–10.5)
WBC # FLD AUTO: 19 K/UL — HIGH (ref 3.8–10.5)

## 2018-07-16 PROCEDURE — G0463: CPT

## 2018-07-16 PROCEDURE — C1739: CPT

## 2018-07-16 PROCEDURE — 85027 COMPLETE CBC AUTOMATED: CPT

## 2018-07-16 PROCEDURE — 19285 PERQ DEV BREAST 1ST US IMAG: CPT | Mod: RT

## 2018-07-16 PROCEDURE — 19285 PERQ DEV BREAST 1ST US IMAG: CPT

## 2018-07-16 PROCEDURE — 80053 COMPREHEN METABOLIC PANEL: CPT

## 2018-07-16 RX ORDER — SODIUM CHLORIDE 9 MG/ML
1000 INJECTION, SOLUTION INTRAVENOUS
Qty: 0 | Refills: 0 | Status: DISCONTINUED | OUTPATIENT
Start: 2018-07-17 | End: 2018-07-17

## 2018-07-16 NOTE — H&P PST ADULT - HISTORY OF PRESENT ILLNESS
46 y/o female Icelandic speaking female presents to PST.  Diagnosed with right breast cancer few months ago. She was initially scheduled for right breast lumpectomy 2 months ago but surgery was cancelled due to lack of cardiac clearance. Scheduled for right breast lumpectomy post saviscout reflector placement, right axillary sentinel node biopsy on 7/17/18.

## 2018-07-16 NOTE — H&P PST ADULT - ASSESSMENT
48 y/o female Bulgarian speaking female with right breast cancer.  Scheduled for right breast lumpectomy post saviscout reflector placement, right axillary sentinel node biopsy on 7/17/18.

## 2018-07-16 NOTE — H&P PST ADULT - FAMILY HISTORY
Mother  Still living? No  Family history of kidney disease, Age at diagnosis: Age Unknown  Family history of diabetes mellitus, Age at diagnosis: Age Unknown  Family history of hypertension, Age at diagnosis: Age Unknown     Father  Still living? No  Family history of stroke, Age at diagnosis: Age Unknown

## 2018-07-16 NOTE — H&P PST ADULT - PROBLEM SELECTOR PLAN 1
Scheduled for right breast lumpectomy post saviscout reflector placement, right axillary sentinel node biopsy on 7/17/18.    Pre-op test ordered.  Obtain medical and cardiac clearances.  Take Amlodipine, atenolol, pepcid with a sip of water.

## 2018-07-16 NOTE — H&P PST ADULT - NSANTHOSAYNRD_GEN_A_CORE
No. MUNIRA screening performed.  STOP BANG Legend: 0-2 = LOW Risk; 3-4 = INTERMEDIATE Risk; 5-8 = HIGH Risk

## 2018-07-16 NOTE — H&P PST ADULT - PSH
H/O prior ablation treatment  2011  H/O tubal ligation  1997, bilateral  Status post fine needle biopsy  7/16/18

## 2018-07-16 NOTE — H&P PST ADULT - PMH
Bladder prolapse, female, acquired    Essential hypertension    Malignant neoplasm of right female breast, unspecified estrogen receptor status, unspecified site of breast    Type 2 diabetes mellitus without complication, without long-term current use of insulin    Uterine leiomyoma, unspecified location  2011

## 2018-07-17 ENCOUNTER — OUTPATIENT (OUTPATIENT)
Dept: OUTPATIENT SERVICES | Facility: HOSPITAL | Age: 48
LOS: 1 days | End: 2018-07-17
Payer: SELF-PAY

## 2018-07-17 ENCOUNTER — APPOINTMENT (OUTPATIENT)
Dept: SURGICAL ONCOLOGY | Facility: HOSPITAL | Age: 48
End: 2018-07-17

## 2018-07-17 ENCOUNTER — RESULT REVIEW (OUTPATIENT)
Age: 48
End: 2018-07-17

## 2018-07-17 VITALS
SYSTOLIC BLOOD PRESSURE: 105 MMHG | DIASTOLIC BLOOD PRESSURE: 71 MMHG | HEART RATE: 74 BPM | RESPIRATION RATE: 18 BRPM | OXYGEN SATURATION: 95 % | TEMPERATURE: 99 F

## 2018-07-17 VITALS
DIASTOLIC BLOOD PRESSURE: 91 MMHG | SYSTOLIC BLOOD PRESSURE: 136 MMHG | RESPIRATION RATE: 20 BRPM | OXYGEN SATURATION: 96 % | HEART RATE: 66 BPM | WEIGHT: 207.68 LBS | HEIGHT: 63 IN | TEMPERATURE: 98 F

## 2018-07-17 DIAGNOSIS — C50.911 MALIGNANT NEOPLASM OF UNSPECIFIED SITE OF RIGHT FEMALE BREAST: ICD-10-CM

## 2018-07-17 DIAGNOSIS — Z98.890 OTHER SPECIFIED POSTPROCEDURAL STATES: Chronic | ICD-10-CM

## 2018-07-17 DIAGNOSIS — Z98.51 TUBAL LIGATION STATUS: Chronic | ICD-10-CM

## 2018-07-17 LAB
GLUCOSE BLDC GLUCOMTR-MCNC: 102 MG/DL — HIGH (ref 70–99)
GLUCOSE BLDC GLUCOMTR-MCNC: 93 MG/DL — SIGNIFICANT CHANGE UP (ref 70–99)

## 2018-07-17 PROCEDURE — 88305 TISSUE EXAM BY PATHOLOGIST: CPT | Mod: 26

## 2018-07-17 PROCEDURE — 19366: CPT | Mod: 59

## 2018-07-17 PROCEDURE — 38900 IO MAP OF SENT LYMPH NODE: CPT | Mod: 59

## 2018-07-17 PROCEDURE — 38308 INCISION OF LYMPH CHANNELS: CPT

## 2018-07-17 PROCEDURE — 76098 X-RAY EXAM SURGICAL SPECIMEN: CPT

## 2018-07-17 PROCEDURE — 88305 TISSUE EXAM BY PATHOLOGIST: CPT

## 2018-07-17 PROCEDURE — 82962 GLUCOSE BLOOD TEST: CPT

## 2018-07-17 PROCEDURE — 38792 RA TRACER ID OF SENTINL NODE: CPT

## 2018-07-17 PROCEDURE — 76098 X-RAY EXAM SURGICAL SPECIMEN: CPT | Mod: 26

## 2018-07-17 PROCEDURE — 88307 TISSUE EXAM BY PATHOLOGIST: CPT

## 2018-07-17 PROCEDURE — 38525 BIOPSY/REMOVAL LYMPH NODES: CPT | Mod: RT

## 2018-07-17 PROCEDURE — A9541: CPT

## 2018-07-17 PROCEDURE — 38790 INJECT FOR LYMPHATIC X-RAY: CPT | Mod: 59

## 2018-07-17 PROCEDURE — 19302 P-MASTECTOMY W/LN REMOVAL: CPT

## 2018-07-17 PROCEDURE — 88307 TISSUE EXAM BY PATHOLOGIST: CPT | Mod: 26

## 2018-07-17 PROCEDURE — 19301 PARTIAL MASTECTOMY: CPT | Mod: RT

## 2018-07-17 PROCEDURE — C1889: CPT

## 2018-07-17 RX ORDER — SODIUM CHLORIDE 9 MG/ML
1000 INJECTION, SOLUTION INTRAVENOUS
Qty: 0 | Refills: 0 | Status: DISCONTINUED | OUTPATIENT
Start: 2018-07-17 | End: 2018-07-17

## 2018-07-17 RX ORDER — OXYCODONE AND ACETAMINOPHEN 5; 325 MG/1; MG/1
1 TABLET ORAL EVERY 4 HOURS
Qty: 0 | Refills: 0 | Status: DISCONTINUED | OUTPATIENT
Start: 2018-07-17 | End: 2018-07-17

## 2018-07-17 RX ORDER — ONDANSETRON 8 MG/1
4 TABLET, FILM COATED ORAL ONCE
Qty: 0 | Refills: 0 | Status: DISCONTINUED | OUTPATIENT
Start: 2018-07-17 | End: 2018-07-17

## 2018-07-17 RX ORDER — HYDROMORPHONE HYDROCHLORIDE 2 MG/ML
0.5 INJECTION INTRAMUSCULAR; INTRAVENOUS; SUBCUTANEOUS
Qty: 0 | Refills: 0 | Status: DISCONTINUED | OUTPATIENT
Start: 2018-07-17 | End: 2018-07-17

## 2018-07-17 RX ADMIN — SODIUM CHLORIDE 50 MILLILITER(S): 9 INJECTION, SOLUTION INTRAVENOUS at 10:26

## 2018-07-17 RX ADMIN — HYDROMORPHONE HYDROCHLORIDE 0.5 MILLIGRAM(S): 2 INJECTION INTRAMUSCULAR; INTRAVENOUS; SUBCUTANEOUS at 14:35

## 2018-07-17 RX ADMIN — HYDROMORPHONE HYDROCHLORIDE 0.5 MILLIGRAM(S): 2 INJECTION INTRAMUSCULAR; INTRAVENOUS; SUBCUTANEOUS at 14:25

## 2018-07-17 NOTE — BRIEF OPERATIVE NOTE - PROCEDURE
<<-----Click on this checkbox to enter Procedure [place] lymph node biopsy  07/17/2018  right  Active  [facility]  Lumpectomy for malignant neoplasm of breast  07/17/2018  right breast  Active  [facility]

## 2018-07-17 NOTE — ASU DISCHARGE PLAN (ADULT/PEDIATRIC). - SPECIAL INSTRUCTIONS
no heavy lifting  so not touch or pick at incision   shower after 48 hours   discharge home from ASU when meets criteria   prescription for pain medication sent to pharmacy

## 2018-07-23 LAB — SURGICAL PATHOLOGY STUDY: SIGNIFICANT CHANGE UP

## 2018-07-25 PROBLEM — E11.9 TYPE 2 DIABETES MELLITUS WITHOUT COMPLICATIONS: Chronic | Status: ACTIVE | Noted: 2018-07-16

## 2018-07-25 PROBLEM — D25.9 LEIOMYOMA OF UTERUS, UNSPECIFIED: Chronic | Status: ACTIVE | Noted: 2018-07-16

## 2018-07-25 PROBLEM — N81.10 CYSTOCELE, UNSPECIFIED: Chronic | Status: ACTIVE | Noted: 2018-07-16

## 2018-07-25 PROBLEM — I10 ESSENTIAL (PRIMARY) HYPERTENSION: Chronic | Status: ACTIVE | Noted: 2018-07-16

## 2018-08-01 ENCOUNTER — APPOINTMENT (OUTPATIENT)
Dept: SURGICAL ONCOLOGY | Facility: CLINIC | Age: 48
End: 2018-08-01
Payer: SELF-PAY

## 2018-08-01 VITALS
BODY MASS INDEX: 32.99 KG/M2 | WEIGHT: 198 LBS | HEIGHT: 65 IN | DIASTOLIC BLOOD PRESSURE: 82 MMHG | SYSTOLIC BLOOD PRESSURE: 120 MMHG | OXYGEN SATURATION: 96 % | RESPIRATION RATE: 16 BRPM | HEART RATE: 62 BPM

## 2018-08-01 PROCEDURE — 99024 POSTOP FOLLOW-UP VISIT: CPT

## 2018-08-06 ENCOUNTER — OUTPATIENT (OUTPATIENT)
Dept: OUTPATIENT SERVICES | Facility: HOSPITAL | Age: 48
LOS: 1 days | Discharge: HOME | End: 2018-08-06

## 2018-08-06 DIAGNOSIS — Z98.890 OTHER SPECIFIED POSTPROCEDURAL STATES: Chronic | ICD-10-CM

## 2018-08-06 DIAGNOSIS — Z98.51 TUBAL LIGATION STATUS: Chronic | ICD-10-CM

## 2018-08-06 DIAGNOSIS — C50.919 MALIGNANT NEOPLASM OF UNSPECIFIED SITE OF UNSPECIFIED FEMALE BREAST: ICD-10-CM

## 2018-08-15 ENCOUNTER — RESULT REVIEW (OUTPATIENT)
Age: 48
End: 2018-08-15

## 2018-08-20 ENCOUNTER — APPOINTMENT (OUTPATIENT)
Dept: HEMATOLOGY ONCOLOGY | Facility: CLINIC | Age: 48
End: 2018-08-20

## 2018-08-20 VITALS
OXYGEN SATURATION: 99 % | WEIGHT: 206.13 LBS | BODY MASS INDEX: 36.07 KG/M2 | RESPIRATION RATE: 16 BRPM | HEIGHT: 63.23 IN | TEMPERATURE: 98.9 F | SYSTOLIC BLOOD PRESSURE: 142 MMHG | DIASTOLIC BLOOD PRESSURE: 88 MMHG | HEART RATE: 76 BPM

## 2018-08-27 ENCOUNTER — APPOINTMENT (OUTPATIENT)
Dept: HEMATOLOGY ONCOLOGY | Facility: CLINIC | Age: 48
End: 2018-08-27

## 2018-08-27 ENCOUNTER — RESULT REVIEW (OUTPATIENT)
Age: 48
End: 2018-08-27

## 2018-08-27 VITALS
WEIGHT: 206.13 LBS | HEART RATE: 76 BPM | OXYGEN SATURATION: 98 % | RESPIRATION RATE: 16 BRPM | TEMPERATURE: 98.8 F | DIASTOLIC BLOOD PRESSURE: 90 MMHG | BODY MASS INDEX: 36.25 KG/M2 | SYSTOLIC BLOOD PRESSURE: 139 MMHG

## 2018-10-18 ENCOUNTER — OUTPATIENT (OUTPATIENT)
Dept: OUTPATIENT SERVICES | Facility: HOSPITAL | Age: 48
LOS: 1 days | Discharge: HOME | End: 2018-10-18

## 2018-10-18 DIAGNOSIS — Z98.890 OTHER SPECIFIED POSTPROCEDURAL STATES: Chronic | ICD-10-CM

## 2018-10-18 DIAGNOSIS — C50.919 MALIGNANT NEOPLASM OF UNSPECIFIED SITE OF UNSPECIFIED FEMALE BREAST: ICD-10-CM

## 2018-10-18 DIAGNOSIS — Z98.51 TUBAL LIGATION STATUS: Chronic | ICD-10-CM

## 2018-10-25 ENCOUNTER — APPOINTMENT (OUTPATIENT)
Dept: HEMATOLOGY ONCOLOGY | Facility: CLINIC | Age: 48
End: 2018-10-25

## 2018-10-25 VITALS
BODY MASS INDEX: 37.22 KG/M2 | WEIGHT: 211.64 LBS | HEART RATE: 90 BPM | OXYGEN SATURATION: 98 % | TEMPERATURE: 98.1 F | DIASTOLIC BLOOD PRESSURE: 92 MMHG | SYSTOLIC BLOOD PRESSURE: 140 MMHG | RESPIRATION RATE: 16 BRPM

## 2018-11-07 ENCOUNTER — APPOINTMENT (OUTPATIENT)
Dept: SURGICAL ONCOLOGY | Facility: CLINIC | Age: 48
End: 2018-11-07
Payer: COMMERCIAL

## 2018-11-07 VITALS
SYSTOLIC BLOOD PRESSURE: 134 MMHG | HEART RATE: 67 BPM | RESPIRATION RATE: 15 BRPM | BODY MASS INDEX: 37.21 KG/M2 | WEIGHT: 210 LBS | DIASTOLIC BLOOD PRESSURE: 87 MMHG | HEIGHT: 63 IN

## 2018-11-07 PROCEDURE — 99214 OFFICE O/P EST MOD 30 MIN: CPT

## 2018-12-19 ENCOUNTER — OUTPATIENT (OUTPATIENT)
Dept: OUTPATIENT SERVICES | Facility: HOSPITAL | Age: 48
LOS: 1 days | Discharge: HOME | End: 2018-12-19

## 2018-12-19 DIAGNOSIS — C50.919 MALIGNANT NEOPLASM OF UNSPECIFIED SITE OF UNSPECIFIED FEMALE BREAST: ICD-10-CM

## 2018-12-19 DIAGNOSIS — Z98.890 OTHER SPECIFIED POSTPROCEDURAL STATES: Chronic | ICD-10-CM

## 2018-12-19 DIAGNOSIS — Z98.51 TUBAL LIGATION STATUS: Chronic | ICD-10-CM

## 2019-01-01 ENCOUNTER — RX RENEWAL (OUTPATIENT)
Age: 49
End: 2019-01-01

## 2019-01-01 NOTE — ASU PATIENT PROFILE, ADULT - PMH
Idalia Oliva MD  Pediatric Hospitalist  [phone]
Bladder prolapse, female, acquired    Essential hypertension    Malignant neoplasm of right female breast, unspecified estrogen receptor status, unspecified site of breast    Type 2 diabetes mellitus without complication, without long-term current use of insulin    Uterine leiomyoma, unspecified location  2011

## 2019-01-07 ENCOUNTER — APPOINTMENT (OUTPATIENT)
Dept: HEMATOLOGY ONCOLOGY | Facility: CLINIC | Age: 49
End: 2019-01-07

## 2019-01-07 VITALS
DIASTOLIC BLOOD PRESSURE: 91 MMHG | OXYGEN SATURATION: 97 % | BODY MASS INDEX: 37.31 KG/M2 | RESPIRATION RATE: 16 BRPM | TEMPERATURE: 97.4 F | SYSTOLIC BLOOD PRESSURE: 138 MMHG | WEIGHT: 210.63 LBS | HEART RATE: 62 BPM

## 2019-01-07 NOTE — ASSESSMENT
[FreeTextEntry1] : Pt is a 48 yo female w/ pmhx stage 1A ([id number]) invasive ductal carcinoma breast ER/MN +, HER2 neg s/p right lumpectomy and adjuvant radiation. \par \par 1) Ductal Carcinoma of Breast- oncotype has returned at 16. On original study this was considered low risk (<18). In tailorx trial this was considered intermediate and small benefit was noted with chemoendocrine over endocrine therapy alone in age <50 in this group. However, given on low end of intermediate and benefit was small, agreed with patient about pursuing endocrine therapy alone for now. \par - now is s/p adjuvant radiation, treatment completed 12/11/18 \par - tamoxifen ordered to begin immediately; benefits and risks explained to patient once again. Stressed importance of yearly gyn exams - she says this is scheduled in a couple months along with a mammogram \par - genetic testing - 17 gene testing all came back negative. No further testing needed.\par -RTC in 3 months\par \par Discussed with Dr. Hsu. Following longitudinally with Dr. Gibson. \par Laureano Tracy, PGY IV.\par \par

## 2019-01-07 NOTE — PHYSICAL EXAM
[Fully active, able to carry on all pre-disease performance without restriction] : Status 0 - Fully active, able to carry on all pre-disease performance without restriction [Normal] : bilateral breasts without nipple retraction, skin dimpling or palpable masses; the bilateral axillae are without adenopathy [de-identified] : no skin breakdown at radiation site; fibrous hardened tissue felt at surgical site

## 2019-01-07 NOTE — REASON FOR VISIT
[Initial Consultation] : an initial consultation for [Family Member] : family member [FreeTextEntry2] : breast cancer

## 2019-01-07 NOTE — HISTORY OF PRESENT ILLNESS
[Disease:__________________________] : Disease: [unfilled] [de-identified] : Pt is a 46 yo female w/ pmhx HTN and DMII who initially presented with abnormal mammogram and breast mass. US guided core biopsy showed infiltrating ductal carcinoma of 1 cm mass. She was seen by surgical oncology and is now s/p right lumpectomy. Right sentinal node biopsy done as well which was node negative. Margins were negative with surgery. Pathology returned with moderately differentiated ductal carcinoma with Sumeet score 6/9. ER 80-90% positive, [place] 20-25% positive. Her-2 was equivocal with negative FISH. 1.2 cm mass. Stage 1A- [id number]. \par \par Completed Radiation therapy 12/11; four weeks total  [de-identified] : 1A  [de-identified] : moderately differentiated ductal carcinoma  [de-identified] : Pt notes she was able to schedule radiation treatments. Underwent 4 weeks M-F schedule, treatment ended 12/11. She had mild skin breakdown at radiation field underneath breast but now resolved. No other complaints. No fevers, chills, dyspena, or pain. \par

## 2019-03-13 ENCOUNTER — APPOINTMENT (OUTPATIENT)
Dept: SURGICAL ONCOLOGY | Facility: CLINIC | Age: 49
End: 2019-03-13
Payer: COMMERCIAL

## 2019-03-13 VITALS
BODY MASS INDEX: 38.27 KG/M2 | OXYGEN SATURATION: 97 % | HEIGHT: 63 IN | DIASTOLIC BLOOD PRESSURE: 79 MMHG | SYSTOLIC BLOOD PRESSURE: 148 MMHG | RESPIRATION RATE: 15 BRPM | HEART RATE: 75 BPM | WEIGHT: 216 LBS

## 2019-03-13 PROCEDURE — 99215 OFFICE O/P EST HI 40 MIN: CPT

## 2019-03-13 NOTE — ASSESSMENT
[FreeTextEntry1] : Stage I right breast cancer \par JOSE\par S/p lumpectomy and adjuvant radiation therapy\par Continue hormonal tx as per Dr. Gibson\par Will get yearly breast imaging now\par RTO 3 months

## 2019-03-13 NOTE — CONSULT LETTER
[Dear  ___] : Dear  [unfilled], [Please see my note below.] : Please see my note below. [Sincerely,] : Sincerely, [Courtesy Letter:] : I had the pleasure of seeing your patient, [unfilled], in my office today. [DrTracy  ___] : Dr. RAO [FreeTextEntry3] : Luis Mazariegos MD FACS

## 2019-03-13 NOTE — PHYSICAL EXAM
[Normal] : supple, no neck mass and thyroid not enlarged [Normal Neck Lymph Nodes] : normal neck lymph nodes  [Normal Supraclavicular Lymph Nodes] : normal supraclavicular lymph nodes [Normal Groin Lymph Nodes] : normal groin lymph nodes [Normal] : oriented to person, place and time, with appropriate affect [de-identified] : Right breast and axillary scars well healed. Mild postop and postradiation changes. No masses or adenopathy bilaterally.

## 2019-03-13 NOTE — HISTORY OF PRESENT ILLNESS
[de-identified] : 49 y/o female presents for breast cancer follow up. \par She is s/p right breast lumpectomy post Lali  reflector placement, right axillary/sentinel node biopsy on 7/17/18. \par Pathology - T1cN0 moderately differentiated ducal carcinoma with DCIS, sentinel nodes negative for metastatic disease, margins negative.\par ERPR + Gat7rar -\par OncotypeDx: 16\par \par She is s/p adjuvant radiation therapy 12/11/18.\par She is currently on Tamoxifen as per Dr. Gibson, which she is tolerating well.\par \par MMG 3/10/18: Upper central right breast mass with irregular margins and bilateral small circumscribed masses. Patient will be recalled for further evaluation with ultrasound of both breasts. BIRADS-0 \par \par US 4/11/18: Suspicious right breast 1:00 mass measuring 1 cm on imaging US guided core biopsy advised. BIRADS-5\par US Guided Core Biopsy 4/20/18: Right breast 1:00 Infiltrating ductal carcinoma, moderately differentiated. \par \par Denies palpable breast masses, nipple discharge, nipple retraction/inversion, or skin changes. \par Denies any previous breast biopsies.\par Denies breast pain. \par Denies constitutional symptoms. \par Denies fever or chills. \par No family history of breast or ovarian cancer.\par No prior breast biopsies.

## 2019-03-13 NOTE — ADDENDUM
[FreeTextEntry1] : I, Enedina Duckworth, acted solely as a scribe for Dr. Luis Mazariegos on this date 3/13/19.

## 2019-03-25 ENCOUNTER — FORM ENCOUNTER (OUTPATIENT)
Age: 49
End: 2019-03-25

## 2019-03-26 ENCOUNTER — OUTPATIENT (OUTPATIENT)
Dept: OUTPATIENT SERVICES | Facility: HOSPITAL | Age: 49
LOS: 1 days | End: 2019-03-26
Payer: COMMERCIAL

## 2019-03-26 ENCOUNTER — APPOINTMENT (OUTPATIENT)
Dept: MAMMOGRAPHY | Facility: CLINIC | Age: 49
End: 2019-03-26
Payer: COMMERCIAL

## 2019-03-26 ENCOUNTER — APPOINTMENT (OUTPATIENT)
Dept: ULTRASOUND IMAGING | Facility: CLINIC | Age: 49
End: 2019-03-26
Payer: COMMERCIAL

## 2019-03-26 DIAGNOSIS — Z98.890 OTHER SPECIFIED POSTPROCEDURAL STATES: Chronic | ICD-10-CM

## 2019-03-26 DIAGNOSIS — Z00.8 ENCOUNTER FOR OTHER GENERAL EXAMINATION: ICD-10-CM

## 2019-03-26 DIAGNOSIS — Z98.51 TUBAL LIGATION STATUS: Chronic | ICD-10-CM

## 2019-03-26 PROCEDURE — G0279: CPT | Mod: 26

## 2019-03-26 PROCEDURE — 77066 DX MAMMO INCL CAD BI: CPT | Mod: 26

## 2019-03-26 PROCEDURE — 77066 DX MAMMO INCL CAD BI: CPT

## 2019-03-26 PROCEDURE — 76641 ULTRASOUND BREAST COMPLETE: CPT

## 2019-03-26 PROCEDURE — 77062 BREAST TOMOSYNTHESIS BI: CPT | Mod: 26

## 2019-03-26 PROCEDURE — 76641 ULTRASOUND BREAST COMPLETE: CPT | Mod: 26,50

## 2019-03-26 PROCEDURE — G0279: CPT

## 2019-03-29 ENCOUNTER — OUTPATIENT (OUTPATIENT)
Dept: OUTPATIENT SERVICES | Facility: HOSPITAL | Age: 49
LOS: 1 days | Discharge: HOME | End: 2019-03-29

## 2019-03-29 DIAGNOSIS — Z98.890 OTHER SPECIFIED POSTPROCEDURAL STATES: Chronic | ICD-10-CM

## 2019-03-29 DIAGNOSIS — C50.919 MALIGNANT NEOPLASM OF UNSPECIFIED SITE OF UNSPECIFIED FEMALE BREAST: ICD-10-CM

## 2019-03-29 DIAGNOSIS — Z98.51 TUBAL LIGATION STATUS: Chronic | ICD-10-CM

## 2019-04-08 ENCOUNTER — APPOINTMENT (OUTPATIENT)
Dept: HEMATOLOGY ONCOLOGY | Facility: CLINIC | Age: 49
End: 2019-04-08

## 2019-04-08 VITALS
HEART RATE: 68 BPM | OXYGEN SATURATION: 96 % | DIASTOLIC BLOOD PRESSURE: 91 MMHG | RESPIRATION RATE: 16 BRPM | WEIGHT: 216.91 LBS | TEMPERATURE: 98.5 F | SYSTOLIC BLOOD PRESSURE: 150 MMHG | BODY MASS INDEX: 38.43 KG/M2

## 2019-04-08 DIAGNOSIS — Z00.00 ENCOUNTER FOR GENERAL ADULT MEDICAL EXAMINATION W/OUT ABNORMAL FINDINGS: ICD-10-CM

## 2019-04-12 NOTE — HISTORY OF PRESENT ILLNESS
[Disease:__________________________] : Disease: [unfilled] [de-identified] : Pt is a 46 yo female w/ pmhx HTN and DMII who initially presented with abnormal mammogram and breast mass. US guided core biopsy showed infiltrating ductal carcinoma of 1 cm mass. She was seen by surgical oncology and is now s/p right lumpectomy. Right sentinal node biopsy done as well which was node negative. Margins were negative with surgery. Pathology returned with moderately differentiated ductal carcinoma with Sumeet score 6/9. ER 80-90% positive, [place] 20-25% positive. Her-2 was equivocal with negative FISH. 1.2 cm mass. Stage 1A- [id number]. \par \par Completed Radiation therapy 12/11; four weeks total  [de-identified] : 1A  [de-identified] : moderately differentiated ductal carcinoma  [de-identified] : Pt started tamoxifen since last visit. Has had no issues with medication. No joint pains, muscle aches, fatigue, hot flashes, bleeding, swelling, fevers, chills, or headache. Saw obgyn and had mammogram end of March which was wnl. Also saw surgical oncology last month. Requesting refill of tamoxifen. \par

## 2019-04-12 NOTE — PHYSICAL EXAM
[Fully active, able to carry on all pre-disease performance without restriction] : Status 0 - Fully active, able to carry on all pre-disease performance without restriction [Normal] : affect appropriate [de-identified] : no skin breakdown at radiation site; fibrous hardened tissue felt at surgical site

## 2019-04-12 NOTE — ASSESSMENT
[FreeTextEntry1] : Pt is a 46 yo female w/ pmhx stage 1A ([id number]) invasive ductal carcinoma breast ER/RI +, HER2 neg s/p right lumpectomy and adjuvant radiation. Started on tamoxifen January 2019.  \par \par 1) Ductal Carcinoma of Breast- oncotype of 16. On original study this was considered low risk (<18). In tailorx trial this was considered intermediate and small benefit was noted with chemoendocrine over endocrine therapy alone in age <50 in this group. However, given on low end of intermediate and benefit was small, agreed with patient about pursuing endocrine therapy alone for now. \par - s/p adjuvant radiation, treatment completed 12/11/18 \par - pt began tamoxifen 1/2019. Tolerating well without any side effects. \par - Maintaining yearly gyn exams and encouraged routine preventative care with PCP\par - Yearly mammagram: completed 3/2019 wnl \par - genetic testing - 17 gene testing all came back negative. No further testing needed.\par - RTC in 3 months\par \par 2) Health Maintenance- encouraged dexa scan with primary provider for baseline \par \par Discussed with Dr. Gonzalez. Following longitudinally with Dr. Gibson. \par Laureano Tracy, PGY IV\par \par

## 2019-04-12 NOTE — END OF VISIT
[FreeTextEntry3] : 50F Stage IA ER+VA+HER2- breast cancer s/p lumpectomy and SLNB, Oncotype 16, s/p XRT and tamoxifen 1/2019, doing well without AEs. recent gyn eval OK, Mammogram this month OK. Continue tamoxifen, follow months.  [] : Fellow

## 2019-06-12 ENCOUNTER — APPOINTMENT (OUTPATIENT)
Dept: SURGICAL ONCOLOGY | Facility: CLINIC | Age: 49
End: 2019-06-12
Payer: COMMERCIAL

## 2019-06-12 VITALS
TEMPERATURE: 99.2 F | WEIGHT: 217 LBS | SYSTOLIC BLOOD PRESSURE: 114 MMHG | BODY MASS INDEX: 38.45 KG/M2 | HEART RATE: 62 BPM | HEIGHT: 63 IN | DIASTOLIC BLOOD PRESSURE: 79 MMHG | RESPIRATION RATE: 16 BRPM | OXYGEN SATURATION: 93 %

## 2019-06-12 PROCEDURE — 99215 OFFICE O/P EST HI 40 MIN: CPT

## 2019-06-12 NOTE — CONSULT LETTER
[Dear  ___] : Dear  [unfilled], [Sincerely,] : Sincerely, [Please see my note below.] : Please see my note below. [Courtesy Letter:] : I had the pleasure of seeing your patient, [unfilled], in my office today. [DrTracy  ___] : Dr. RAO [FreeTextEntry3] : Luis Mazariegos MD FACS

## 2019-06-12 NOTE — PHYSICAL EXAM
[Normal] : supple, no neck mass and thyroid not enlarged [Normal Neck Lymph Nodes] : normal neck lymph nodes  [Normal Groin Lymph Nodes] : normal groin lymph nodes [Normal Supraclavicular Lymph Nodes] : normal supraclavicular lymph nodes [Normal] : oriented to person, place and time, with appropriate affect [de-identified] : Right breast lumpectomy and axillary scars well healed. Mild postradiation changes. No masses or adenopathy bilaterally.

## 2019-06-12 NOTE — ADDENDUM
[FreeTextEntry1] : I, Enedina Duckworth, acted solely as a scribe for Dr. Luis Mazariegos on this date 06/12/2019.

## 2019-06-12 NOTE — ASSESSMENT
[FreeTextEntry1] : Stage I right breast cancer \par JOSE\par S/p lumpectomy and adjuvant radiation therapy\par Normal physical examination\par BIRADS-2 breast imaging March 2019\par Continue hormonal tx as per Dr. Gibson\par RTO 4 months

## 2019-06-12 NOTE — HISTORY OF PRESENT ILLNESS
[de-identified] : 47 y/o female presents for breast cancer follow up. \par She is s/p right breast lumpectomy post Lali  reflector placement, right axillary/sentinel node biopsy on 7/17/18. \par Pathology - T1cN0 moderately differentiated ducal carcinoma with DCIS, sentinel nodes negative for metastatic disease, margins negative.\par ERPR + Thd0ovh -\par Oncotype Dx: 16\par \par Bilateral MMG/US 3/26/19: multiple bilateral cysts. No evidence of malignancy. BIRADS-2. \par \par She is s/p adjuvant radiation therapy 12/11/18.\par She is currently on Tamoxifen as per Dr. Gibson, which she is tolerating well.\par \par US 4/11/18: Suspicious right breast 1:00 mass measuring 1 cm on imaging US guided core biopsy advised. BIRADS-5\par US Guided Core Biopsy 4/20/18: Right breast 1:00 Infiltrating ductal carcinoma, moderately differentiated. \par \par Denies palpable breast masses, nipple discharge, nipple retraction/inversion, or skin changes. \par Denies any previous breast biopsies.\par Denies breast pain. \par Denies constitutional symptoms. \par Denies fever or chills. \par No family history of breast or ovarian cancer.

## 2019-06-24 ENCOUNTER — RESULT REVIEW (OUTPATIENT)
Age: 49
End: 2019-06-24

## 2019-07-10 ENCOUNTER — OUTPATIENT (OUTPATIENT)
Dept: OUTPATIENT SERVICES | Facility: HOSPITAL | Age: 49
LOS: 1 days | Discharge: HOME | End: 2019-07-10

## 2019-07-10 DIAGNOSIS — C50.919 MALIGNANT NEOPLASM OF UNSPECIFIED SITE OF UNSPECIFIED FEMALE BREAST: ICD-10-CM

## 2019-07-10 DIAGNOSIS — Z98.51 TUBAL LIGATION STATUS: Chronic | ICD-10-CM

## 2019-07-10 DIAGNOSIS — Z98.890 OTHER SPECIFIED POSTPROCEDURAL STATES: Chronic | ICD-10-CM

## 2019-07-12 ENCOUNTER — FORM ENCOUNTER (OUTPATIENT)
Age: 49
End: 2019-07-12

## 2019-07-13 ENCOUNTER — OUTPATIENT (OUTPATIENT)
Dept: OUTPATIENT SERVICES | Facility: HOSPITAL | Age: 49
LOS: 1 days | End: 2019-07-13
Payer: COMMERCIAL

## 2019-07-13 ENCOUNTER — APPOINTMENT (OUTPATIENT)
Dept: ULTRASOUND IMAGING | Facility: CLINIC | Age: 49
End: 2019-07-13
Payer: COMMERCIAL

## 2019-07-13 DIAGNOSIS — E66.01 MORBID (SEVERE) OBESITY DUE TO EXCESS CALORIES: ICD-10-CM

## 2019-07-13 DIAGNOSIS — Z98.51 TUBAL LIGATION STATUS: Chronic | ICD-10-CM

## 2019-07-13 DIAGNOSIS — Z98.890 OTHER SPECIFIED POSTPROCEDURAL STATES: Chronic | ICD-10-CM

## 2019-07-13 DIAGNOSIS — Z85.3 PERSONAL HISTORY OF MALIGNANT NEOPLASM OF BREAST: ICD-10-CM

## 2019-07-13 DIAGNOSIS — Z79.810 LONG TERM (CURRENT) USE OF SELECTIVE ESTROGEN RECEPTOR MODULATORS (SERMS): ICD-10-CM

## 2019-07-13 PROCEDURE — 76830 TRANSVAGINAL US NON-OB: CPT

## 2019-07-13 PROCEDURE — 76856 US EXAM PELVIC COMPLETE: CPT

## 2019-07-13 PROCEDURE — 76856 US EXAM PELVIC COMPLETE: CPT | Mod: 26

## 2019-07-13 PROCEDURE — 76830 TRANSVAGINAL US NON-OB: CPT | Mod: 26

## 2019-07-15 ENCOUNTER — APPOINTMENT (OUTPATIENT)
Dept: HEMATOLOGY ONCOLOGY | Facility: CLINIC | Age: 49
End: 2019-07-15

## 2019-07-15 ENCOUNTER — RESULT REVIEW (OUTPATIENT)
Age: 49
End: 2019-07-15

## 2019-07-15 VITALS
OXYGEN SATURATION: 95 % | WEIGHT: 220.46 LBS | BODY MASS INDEX: 39.05 KG/M2 | HEART RATE: 72 BPM | DIASTOLIC BLOOD PRESSURE: 87 MMHG | TEMPERATURE: 98.4 F | SYSTOLIC BLOOD PRESSURE: 121 MMHG | RESPIRATION RATE: 16 BRPM

## 2019-07-15 LAB
HCT VFR BLD CALC: 46 % — HIGH (ref 34.5–45)
HGB BLD-MCNC: 15.2 G/DL — SIGNIFICANT CHANGE UP (ref 11.5–15.5)
MCHC RBC-ENTMCNC: 30.3 PG — SIGNIFICANT CHANGE UP (ref 27–34)
MCHC RBC-ENTMCNC: 33.1 G/DL — SIGNIFICANT CHANGE UP (ref 32–36)
MCV RBC AUTO: 91.4 FL — SIGNIFICANT CHANGE UP (ref 80–100)
PLATELET # BLD AUTO: 253 K/UL — SIGNIFICANT CHANGE UP (ref 150–400)
RBC # BLD: 5.03 M/UL — SIGNIFICANT CHANGE UP (ref 3.8–5.2)
RBC # FLD: 13.1 % — SIGNIFICANT CHANGE UP (ref 10.3–14.5)
WBC # BLD: 16.8 K/UL — HIGH (ref 3.8–10.5)
WBC # FLD AUTO: 16.8 K/UL — HIGH (ref 3.8–10.5)

## 2019-07-15 NOTE — PHYSICAL EXAM
[Fully active, able to carry on all pre-disease performance without restriction] : Status 0 - Fully active, able to carry on all pre-disease performance without restriction [Normal] : affect appropriate [de-identified] : no skin breakdown at radiation site; fibrous hardened tissue felt at surgical site

## 2019-07-15 NOTE — REASON FOR VISIT
[Follow-Up Visit] : a follow-up visit for [Family Member] : family member [FreeTextEntry2] : breast cancer

## 2019-07-15 NOTE — ASSESSMENT
[FreeTextEntry1] : Pt is a 46 yo female w/ pmhx stage 1A (M5uP6G7) invasive ductal carcinoma breast ER/TX +, HER2 neg s/p right lumpectomy and adjuvant radiation. Started on tamoxifen January 2019.  \par \par 1) Ductal Carcinoma of Breast- oncotype of 16. On original study this was considered low risk (<18). In tailorx trial this was considered intermediate and small benefit was noted with chemoendocrine over endocrine therapy alone in age <50 in this group. However, given on low end of intermediate and benefit was small, agreed with patient about pursuing endocrine therapy alone for now. \par - s/p adjuvant radiation, treatment completed 12/11/18 \par - pt began tamoxifen 1/2019. Tolerating well without any side effects. \par - Maintaining yearly gyn exams and encouraged routine preventative care with PCP\par - Yearly mammogram: completed 3/2019 wnl \par - genetic testing - 17 gene testing all came back negative. No further testing needed.\par \par 2) Amenorrhea \par - pt notes no menstrual periods currently\par - no menopausal symptoms noted\par - will check FSH, LH, and estradiol levels to further evaluate\par - if in fact menopausal, may consider switching to AI in near future \par \par Discussed with Dr. Jones. Following longitudinally with Dr. Gibson. \par Laureano Tracy, PGY V\par \par

## 2019-07-15 NOTE — HISTORY OF PRESENT ILLNESS
[Disease: _____________________] : Disease: [unfilled] [T: ___] : T[unfilled] [N: ___] : N[unfilled] [M: ___] : M[unfilled] [AJCC Stage: ____] : AJCC Stage: [unfilled] [de-identified] : Pt is a 46 yo female w/ pmhx HTN and DMII who initially presented with abnormal mammogram and breast mass. US guided core biopsy showed infiltrating ductal carcinoma of 1 cm mass. She was seen by surgical oncology and is now s/p right lumpectomy. Right sentinal node biopsy done as well which was node negative. Margins were negative with surgery. Pathology returned with moderately differentiated ductal carcinoma with Sumeet score 6/9. ER 80-90% positive, [place] 20-25% positive. Her-2 was equivocal with negative FISH. 1.2 cm mass. Stage 1A- [id number]. \par \par Completed Radiation therapy 12/11; four weeks total  [de-identified] : moderately differentiated ductal carcinoma  [de-identified] : ER 80-90%, [place] 20-25%, HER2 negative  [de-identified] : Pt continues to take tamoxifen daily without any issues. No joint pains, muscle aches, fatigue, hot flashes, bleeding, swelling, fevers, chills, or headache. Pt reports no menstrual cycles since last year. No menopausal symptoms - hot flashes, vaginal dryness, etc. Otherwise, no complaints. \par

## 2019-07-16 LAB
ESTRADIOL FREE SERPL-MCNC: 15 PG/ML — SIGNIFICANT CHANGE UP
FSH SERPL-MCNC: 12.9 IU/L — SIGNIFICANT CHANGE UP
LH SERPL-ACNC: 6.8 IU/L — SIGNIFICANT CHANGE UP

## 2019-10-16 ENCOUNTER — APPOINTMENT (OUTPATIENT)
Dept: SURGICAL ONCOLOGY | Facility: CLINIC | Age: 49
End: 2019-10-16
Payer: COMMERCIAL

## 2019-10-16 VITALS
HEIGHT: 63 IN | OXYGEN SATURATION: 98 % | SYSTOLIC BLOOD PRESSURE: 122 MMHG | WEIGHT: 224 LBS | HEART RATE: 64 BPM | BODY MASS INDEX: 39.69 KG/M2 | DIASTOLIC BLOOD PRESSURE: 77 MMHG

## 2019-10-16 PROCEDURE — 99215 OFFICE O/P EST HI 40 MIN: CPT

## 2019-10-16 NOTE — CONSULT LETTER
[Dear  ___] : Dear  [unfilled], [Courtesy Letter:] : I had the pleasure of seeing your patient, [unfilled], in my office today. [Please see my note below.] : Please see my note below. [Sincerely,] : Sincerely, [DrTracy  ___] : Dr. RAO [FreeTextEntry3] : Luis Mazariegos MD FACS

## 2019-10-16 NOTE — PHYSICAL EXAM
[Normal] : supple, no neck mass and thyroid not enlarged [Normal Neck Lymph Nodes] : normal neck lymph nodes  [Normal Supraclavicular Lymph Nodes] : normal supraclavicular lymph nodes [Normal Groin Lymph Nodes] : normal groin lymph nodes [Normal] : oriented to person, place and time, with appropriate affect [de-identified] : Right breast lumpectomy and axillary scars well healed. Mild post-radiation changes right breast. No masses or adenopathy bilaterally.

## 2019-10-16 NOTE — ASSESSMENT
[FreeTextEntry1] : Stage I right breast cancer \par JOSE\par S/p lumpectomy and adjuvant radiation therapy\par Normal physical examination\par BIRADS-2 breast imaging March 2019\par Continue hormonal tx as per Dr. Gibson\par Continue yearly breast imaging March 2020\par RTO after MMG and US

## 2019-10-16 NOTE — HISTORY OF PRESENT ILLNESS
[de-identified] : 47 y/o female presents for breast cancer follow up. \par She is s/p right breast lumpectomy post Lali  reflector placement, right axillary/sentinel node biopsy on 7/17/18. \par Pathology - T1cN0 moderately differentiated ducal carcinoma with DCIS, sentinel nodes negative for metastatic disease, margins negative.\par ERPR + Tan9wdr -\par Oncotype Dx: 16\par \par She is s/p adjuvant radiation therapy 12/11/18.\par She is currently on Tamoxifen as per Dr. Gibson, which she is tolerating well.\par \par Bilateral MMG/US 3/26/19: multiple bilateral cysts. No evidence of malignancy. BIRADS-2. \par US 4/11/18: Suspicious right breast 1:00 mass measuring 1 cm on imaging US guided core biopsy advised. BIRADS-5\par US Guided Core Biopsy 4/20/18: Right breast 1:00 Infiltrating ductal carcinoma, moderately differentiated. \par \par US Pelvic & Transvaginal (7/13/19): Normal pelvic sonogram.\par \par Denies palpable breast masses, nipple discharge, nipple retraction/inversion, or skin changes. \par Denies any previous breast biopsies.\par Denies breast pain. \par Denies constitutional symptoms. \par Denies fever or chills. \par No family history of breast or ovarian cancer.

## 2019-10-16 NOTE — ADDENDUM
[FreeTextEntry1] : I, Zofia Estse, acted solely as a scribe for Dr. Luis Mazariegos on this date 10/16/2019.

## 2019-10-22 ENCOUNTER — OUTPATIENT (OUTPATIENT)
Dept: OUTPATIENT SERVICES | Facility: HOSPITAL | Age: 49
LOS: 1 days | Discharge: HOME | End: 2019-10-22

## 2019-10-22 DIAGNOSIS — C50.919 MALIGNANT NEOPLASM OF UNSPECIFIED SITE OF UNSPECIFIED FEMALE BREAST: ICD-10-CM

## 2019-10-22 DIAGNOSIS — Z98.51 TUBAL LIGATION STATUS: Chronic | ICD-10-CM

## 2019-10-22 DIAGNOSIS — Z98.890 OTHER SPECIFIED POSTPROCEDURAL STATES: Chronic | ICD-10-CM

## 2019-11-04 ENCOUNTER — OUTPATIENT (OUTPATIENT)
Dept: OUTPATIENT SERVICES | Facility: HOSPITAL | Age: 49
LOS: 1 days | Discharge: HOME | End: 2019-11-04

## 2019-11-04 ENCOUNTER — APPOINTMENT (OUTPATIENT)
Dept: HEMATOLOGY ONCOLOGY | Facility: CLINIC | Age: 49
End: 2019-11-04
Payer: COMMERCIAL

## 2019-11-04 ENCOUNTER — RESULT REVIEW (OUTPATIENT)
Age: 49
End: 2019-11-04

## 2019-11-04 VITALS
DIASTOLIC BLOOD PRESSURE: 81 MMHG | WEIGHT: 225.75 LBS | SYSTOLIC BLOOD PRESSURE: 133 MMHG | TEMPERATURE: 98.7 F | HEART RATE: 77 BPM | BODY MASS INDEX: 39.99 KG/M2 | RESPIRATION RATE: 18 BRPM | OXYGEN SATURATION: 94 %

## 2019-11-04 DIAGNOSIS — Z98.51 TUBAL LIGATION STATUS: Chronic | ICD-10-CM

## 2019-11-04 DIAGNOSIS — C50.919 MALIGNANT NEOPLASM OF UNSPECIFIED SITE OF UNSPECIFIED FEMALE BREAST: ICD-10-CM

## 2019-11-04 DIAGNOSIS — Z98.890 OTHER SPECIFIED POSTPROCEDURAL STATES: Chronic | ICD-10-CM

## 2019-11-04 LAB
BASOPHILS # BLD AUTO: 0.1 K/UL — SIGNIFICANT CHANGE UP (ref 0–0.2)
BASOPHILS NFR BLD AUTO: 0.3 % — SIGNIFICANT CHANGE UP (ref 0–2)
EOSINOPHIL # BLD AUTO: 0.2 K/UL — SIGNIFICANT CHANGE UP (ref 0–0.5)
EOSINOPHIL NFR BLD AUTO: 1 % — SIGNIFICANT CHANGE UP (ref 0–6)
HCT VFR BLD CALC: 46.2 % — HIGH (ref 34.5–45)
HGB BLD-MCNC: 15.9 G/DL — HIGH (ref 11.5–15.5)
LYMPHOCYTES # BLD AUTO: 21 % — SIGNIFICANT CHANGE UP (ref 13–44)
LYMPHOCYTES # BLD AUTO: 3.8 K/UL — HIGH (ref 1–3.3)
MCHC RBC-ENTMCNC: 32.4 PG — SIGNIFICANT CHANGE UP (ref 27–34)
MCHC RBC-ENTMCNC: 34.4 G/DL — SIGNIFICANT CHANGE UP (ref 32–36)
MCV RBC AUTO: 94.1 FL — SIGNIFICANT CHANGE UP (ref 80–100)
MONOCYTES # BLD AUTO: 1.2 K/UL — HIGH (ref 0–0.9)
MONOCYTES NFR BLD AUTO: 6.4 % — SIGNIFICANT CHANGE UP (ref 2–14)
NEUTROPHILS # BLD AUTO: 12.9 K/UL — HIGH (ref 1.8–7.4)
NEUTROPHILS NFR BLD AUTO: 71.3 % — SIGNIFICANT CHANGE UP (ref 43–77)
PLATELET # BLD AUTO: 225 K/UL — SIGNIFICANT CHANGE UP (ref 150–400)
RBC # BLD: 4.91 M/UL — SIGNIFICANT CHANGE UP (ref 3.8–5.2)
RBC # FLD: 12.9 % — SIGNIFICANT CHANGE UP (ref 10.3–14.5)
WBC # BLD: 18.1 K/UL — HIGH (ref 3.8–10.5)
WBC # FLD AUTO: 18.1 K/UL — HIGH (ref 3.8–10.5)

## 2019-11-04 PROCEDURE — 99215 OFFICE O/P EST HI 40 MIN: CPT

## 2019-11-04 NOTE — HISTORY OF PRESENT ILLNESS
[Disease: _____________________] : Disease: [unfilled] [T: ___] : T[unfilled] [N: ___] : N[unfilled] [M: ___] : M[unfilled] [AJCC Stage: ____] : AJCC Stage: [unfilled] [de-identified] : Pt is a 48 yo female w/ pmhx HTN and DMII who initially presented with abnormal right mammogram and breast mass in 3/2018. US guided right breast core biopsy in 4/2018 showed infiltrating ductal carcinoma of 1 cm mass. She was seen by surgical oncology. She underwent right lumpectomy and Right sentinal node biopsy in 7/2018 which revealed 1.2 cm  moderately differentiated ductal carcinoma with Acra score 6/9. ER 80-90% positive, [place] 20-25% positive. Her-2 was equivocal with negative FISH. 1.2 cm mass. 3 sentinel nodes were negative. Stage 1A- [id number]. \par OncotypeDx is 16 \par RT delayed 2/2 insurance issues\par \par Completed Radiation therapy 12/11/18\par tamoxifen started 1/2019 [de-identified] : moderately differentiated ductal carcinoma  [de-identified] : ER 80-90%, [place] 20-25%, HER2 negative  [de-identified] : - genetic testing - 17 gene testing all came back negative. No further testing needed. [FreeTextEntry1] : tamoxifen started 1/2019 [de-identified] : Ms. GIUSEPPE WYNN is here for a follow up visit today for stage I right breast ca dx in 7/2018, s/p RT and on Tamoxifen since 1/2019\par FIRST VISIT WITH ME, TX CARE FROM CLINIC. ENTIRE CHART REVIEWED\par She is tolerating tamoxifen well with tolerable hot flashes. Good compliance. She denies mood changes, vaginal dryness, SOB, chest pain, leg swelling or clotting issues. Her energy and appetite is good, wt stable. Working full time. She has gained 15 lbs since last year. \par LMP date 7/2018,  no intermittent spotting, no vaginal discharge. She hasn’t been using contraception, was reminded to use condoms. Chemically premenopausal 7/2019\par GYN - [facility], doesn’t remember name of GYN. Last seen 4/2019\par Breast imaging: 3/2019\par Opthal:10/2018

## 2019-11-04 NOTE — ASSESSMENT
[FreeTextEntry1] : Ms. GIUSEPPE WYNN is a 49 yo female w/ pmhx stage 1A ([id number]) invasive ductal carcinoma breast ER/NE +, HER2 neg s/p right lumpectomy and adjuvant radiation.  oncotype 16. Started on tamoxifen January 2019.  \par \par - Breast ca: She is tolerating tamoxifen very well without significant side effects. Reports good compliance. Plan to continue tamoxifen for 10 years. Will switch to AI after menopause. GYN and opthal f/u annually due to risk of endometrial ca and cataracts respectively.  Up to date with Breast imaging.\par -  Mild hot flashes: 2/2 tamoxifen.  Advised to wear layers and use fan prn. Consider Effexor if get worse.\par - Wt gain:Life style modifications, healthy diet and exercise d/w her\par - Irregular periods: 2/2 perimenopause. Chemically not in menopause. Annual GYN f/u. She will continue to use non-hormonal contraception. Recehck FSH/estradiol today\par - Patient is aware of signs and symptoms of DVT/PE. Patient will report if she develops acute onset chest pain shortness of breath, leg swelling or calf pain. \par - She has HTN, well controlled. Rec to start ASA for CVA/VTE ppx. \par RTC 6 m.  [Curative] : Goals of care discussed with patient: Curative

## 2019-11-04 NOTE — REASON FOR VISIT
[Follow-Up Visit] : a follow-up [Other: _____] : [unfilled] [[employer] ] : provided by [employer]   [Family Member] : family member [FreeTextEntry1] : [id number] [TWNoteComboBox1] : Finnish [FreeTextEntry2] : breast cancer

## 2019-11-04 NOTE — PHYSICAL EXAM
[Fully active, able to carry on all pre-disease performance without restriction] : Status 0 - Fully active, able to carry on all pre-disease performance without restriction [Normal] : affect appropriate [Obese] : obese [de-identified] : RIGHT BREAST lumpectomy and axillary scar, mild post RT changes

## 2019-11-05 LAB
25(OH)D3 SERPL-MCNC: 26.9 NG/ML
ALBUMIN SERPL ELPH-MCNC: 4 G/DL
ALP BLD-CCNC: 57 U/L
ALT SERPL-CCNC: 14 U/L
ANION GAP SERPL CALC-SCNC: 17 MMOL/L
AST SERPL-CCNC: 14 U/L
BILIRUB SERPL-MCNC: 0.3 MG/DL
BUN SERPL-MCNC: 15 MG/DL
CALCIUM SERPL-MCNC: 9.2 MG/DL
CHLORIDE SERPL-SCNC: 102 MMOL/L
CO2 SERPL-SCNC: 22 MMOL/L
CREAT SERPL-MCNC: 0.66 MG/DL
ESTRADIOL SERPL-MCNC: 6 PG/ML
FSH SERPL-MCNC: 12.1 IU/L
GLUCOSE SERPL-MCNC: 146 MG/DL
POTASSIUM SERPL-SCNC: 3.8 MMOL/L
PROT SERPL-MCNC: 6.4 G/DL
SODIUM SERPL-SCNC: 140 MMOL/L

## 2019-11-29 LAB — CYTOLOGY CVX/VAG DOC THIN PREP: NORMAL

## 2020-05-11 ENCOUNTER — APPOINTMENT (OUTPATIENT)
Dept: HEMATOLOGY ONCOLOGY | Facility: CLINIC | Age: 50
End: 2020-05-11

## 2020-05-12 ENCOUNTER — APPOINTMENT (OUTPATIENT)
Dept: HEMATOLOGY ONCOLOGY | Facility: CLINIC | Age: 50
End: 2020-05-12

## 2020-05-28 ENCOUNTER — OUTPATIENT (OUTPATIENT)
Dept: OUTPATIENT SERVICES | Facility: HOSPITAL | Age: 50
LOS: 1 days | End: 2020-05-28
Payer: COMMERCIAL

## 2020-05-28 ENCOUNTER — APPOINTMENT (OUTPATIENT)
Dept: MAMMOGRAPHY | Facility: CLINIC | Age: 50
End: 2020-05-28
Payer: COMMERCIAL

## 2020-05-28 DIAGNOSIS — Z00.8 ENCOUNTER FOR OTHER GENERAL EXAMINATION: ICD-10-CM

## 2020-05-28 DIAGNOSIS — Z98.890 OTHER SPECIFIED POSTPROCEDURAL STATES: Chronic | ICD-10-CM

## 2020-05-28 DIAGNOSIS — Z98.51 TUBAL LIGATION STATUS: Chronic | ICD-10-CM

## 2020-05-28 PROCEDURE — 77066 DX MAMMO INCL CAD BI: CPT | Mod: 26

## 2020-05-28 PROCEDURE — G0279: CPT

## 2020-05-28 PROCEDURE — G0279: CPT | Mod: 26

## 2020-05-28 PROCEDURE — 77066 DX MAMMO INCL CAD BI: CPT

## 2020-06-01 ENCOUNTER — APPOINTMENT (OUTPATIENT)
Dept: MAMMOGRAPHY | Facility: CLINIC | Age: 50
End: 2020-06-01

## 2020-06-03 ENCOUNTER — APPOINTMENT (OUTPATIENT)
Dept: SURGICAL ONCOLOGY | Facility: CLINIC | Age: 50
End: 2020-06-03
Payer: COMMERCIAL

## 2020-06-03 VITALS
OXYGEN SATURATION: 95 % | WEIGHT: 226 LBS | HEART RATE: 73 BPM | DIASTOLIC BLOOD PRESSURE: 83 MMHG | HEIGHT: 65 IN | RESPIRATION RATE: 18 BRPM | SYSTOLIC BLOOD PRESSURE: 135 MMHG | BODY MASS INDEX: 37.65 KG/M2

## 2020-06-03 PROCEDURE — 99215 OFFICE O/P EST HI 40 MIN: CPT

## 2020-06-03 NOTE — PHYSICAL EXAM
[Normal] : supple, no neck mass and thyroid not enlarged [Normal Neck Lymph Nodes] : normal neck lymph nodes  [Normal Groin Lymph Nodes] : normal groin lymph nodes [Normal Supraclavicular Lymph Nodes] : normal supraclavicular lymph nodes [Normal Axillary Lymph Nodes] : normal axillary lymph nodes [Normal] : grossly intact [de-identified] : Right breas lumpectomy and axillary scars healed, no masses/adenopathy bilat.

## 2020-06-03 NOTE — HISTORY OF PRESENT ILLNESS
[de-identified] : 48 y/o female presents for breast cancer follow up.  used.\par She is s/p right breast lumpectomy post Lali  reflector placement, right axillary/sentinel node biopsy on 7/17/18. 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  She continues medical oncology surveillance with Dr. Li. \par \par Bilateral MMG/US 3/26/19: multiple bilateral cysts. No evidence of malignancy. BIRADS-2.  \par US Pelvic & Transvaginal (7/13/19): Normal pelvic sonogram.\par \par Most recent bilateral mammogram performed in May 2020 demonstrated bilateral probable benign calcifications for which a follow-up diagnostic mammogram in 6 months is advised (BIRADS 3).  No US performed.\par \par \par Denies palpable breast masses, nipple discharge, nipple retraction/inversion, or skin changes. \par Denies any previous breast biopsies.\par Denies breast pain. \par Denies constitutional symptoms. \par Denies fever or chills. \par No family history of breast or ovarian cancer.

## 2020-06-03 NOTE — ASSESSMENT
[FreeTextEntry1] : Stage I right breast cancer \par JOSE\par S/p lumpectomy and adjuvant radiation therapy\par Normal physical examination\par Bilateral BIRADS 3 breast calcifications on recent mammogram\par Diagnostic bilateral mammogram and breast US 11/2020\par Continue hormonal tx as per Dr. Li\par RTO 6 mos

## 2020-06-12 ENCOUNTER — OUTPATIENT (OUTPATIENT)
Dept: OUTPATIENT SERVICES | Facility: HOSPITAL | Age: 50
LOS: 1 days | Discharge: HOME | End: 2020-06-12

## 2020-06-12 DIAGNOSIS — Z98.51 TUBAL LIGATION STATUS: Chronic | ICD-10-CM

## 2020-06-12 DIAGNOSIS — C50.919 MALIGNANT NEOPLASM OF UNSPECIFIED SITE OF UNSPECIFIED FEMALE BREAST: ICD-10-CM

## 2020-06-12 DIAGNOSIS — Z98.890 OTHER SPECIFIED POSTPROCEDURAL STATES: Chronic | ICD-10-CM

## 2020-06-17 ENCOUNTER — RESULT REVIEW (OUTPATIENT)
Age: 50
End: 2020-06-17

## 2020-06-17 ENCOUNTER — APPOINTMENT (OUTPATIENT)
Dept: HEMATOLOGY ONCOLOGY | Facility: CLINIC | Age: 50
End: 2020-06-17
Payer: COMMERCIAL

## 2020-06-17 VITALS
TEMPERATURE: 98.5 F | OXYGEN SATURATION: 95 % | SYSTOLIC BLOOD PRESSURE: 136 MMHG | DIASTOLIC BLOOD PRESSURE: 85 MMHG | BODY MASS INDEX: 37.9 KG/M2 | WEIGHT: 227.74 LBS | RESPIRATION RATE: 17 BRPM | HEART RATE: 85 BPM

## 2020-06-17 LAB
BASOPHILS # BLD AUTO: 0.07 K/UL — SIGNIFICANT CHANGE UP (ref 0–0.2)
BASOPHILS NFR BLD AUTO: 0.3 % — SIGNIFICANT CHANGE UP (ref 0–2)
EOSINOPHIL # BLD AUTO: 0.1 K/UL — SIGNIFICANT CHANGE UP (ref 0–0.5)
EOSINOPHIL NFR BLD AUTO: 0.4 % — SIGNIFICANT CHANGE UP (ref 0–6)
HCT VFR BLD CALC: 43.3 % — SIGNIFICANT CHANGE UP (ref 34.5–45)
HGB BLD-MCNC: 14.2 G/DL — SIGNIFICANT CHANGE UP (ref 11.5–15.5)
IMM GRANULOCYTES NFR BLD AUTO: 3.7 % — HIGH (ref 0–1.5)
LYMPHOCYTES # BLD AUTO: 1.74 K/UL — SIGNIFICANT CHANGE UP (ref 1–3.3)
LYMPHOCYTES # BLD AUTO: 7.5 % — LOW (ref 13–44)
MCHC RBC-ENTMCNC: 30.2 PG — SIGNIFICANT CHANGE UP (ref 27–34)
MCHC RBC-ENTMCNC: 32.8 GM/DL — SIGNIFICANT CHANGE UP (ref 32–36)
MCV RBC AUTO: 92.1 FL — SIGNIFICANT CHANGE UP (ref 80–100)
MONOCYTES # BLD AUTO: 0.66 K/UL — SIGNIFICANT CHANGE UP (ref 0–0.9)
MONOCYTES NFR BLD AUTO: 2.9 % — SIGNIFICANT CHANGE UP (ref 2–14)
NEUTROPHILS # BLD AUTO: 19.64 K/UL — HIGH (ref 1.8–7.4)
NEUTROPHILS NFR BLD AUTO: 85.2 % — HIGH (ref 43–77)
NRBC # BLD: 0 /100 WBCS — SIGNIFICANT CHANGE UP (ref 0–0)
PLATELET # BLD AUTO: 338 K/UL — SIGNIFICANT CHANGE UP (ref 150–400)
RBC # BLD: 4.7 M/UL — SIGNIFICANT CHANGE UP (ref 3.8–5.2)
RBC # FLD: 13.5 % — SIGNIFICANT CHANGE UP (ref 10.3–14.5)
WBC # BLD: 23.06 K/UL — HIGH (ref 3.8–10.5)
WBC # FLD AUTO: 23.06 K/UL — HIGH (ref 3.8–10.5)

## 2020-06-17 PROCEDURE — 99214 OFFICE O/P EST MOD 30 MIN: CPT

## 2020-06-17 NOTE — PHYSICAL EXAM
[Fully active, able to carry on all pre-disease performance without restriction] : Status 0 - Fully active, able to carry on all pre-disease performance without restriction [Obese] : obese [Normal] : affect appropriate [de-identified] : RIGHT BREAST lumpectomy and axillary scar, mild post RT changes

## 2020-06-17 NOTE — HISTORY OF PRESENT ILLNESS
[Disease: _____________________] : Disease: [unfilled] [T: ___] : T[unfilled] [N: ___] : N[unfilled] [M: ___] : M[unfilled] [AJCC Stage: ____] : AJCC Stage: [unfilled] [de-identified] : moderately differentiated ductal carcinoma  [de-identified] : Pt is a 48 yo female w/ pmhx HTN and DMII who initially presented with abnormal right mammogram and breast mass in 3/2018. US guided right breast core biopsy in 4/2018 showed infiltrating ductal carcinoma of 1 cm mass. She was seen by surgical oncology. She underwent right lumpectomy and Right sentinal node biopsy in 7/2018 which revealed 1.2 cm  moderately differentiated ductal carcinoma with [place] score 6/9. ER 80-90% positive, AL 20-25% positive. Her-2 was equivocal with negative FISH. 1.2 cm mass. 3 sentinel nodes were negative. Stage 1A- [id number]. \par OncotypeDx is 16 \par RT delayed 2/2 insurance issues\par \par Completed Radiation therapy 12/11/18\par tamoxifen started 1/2019 [de-identified] : ER 80-90%, MT 20-25%, HER2 negative  [de-identified] : - genetic testing - 17 gene testing all came back negative. No further testing needed. [de-identified] : Ms. GIUSEPPE WYNN is here for a follow up visit today for stage I right breast ca dx in 2018, s/p RT and on Tamoxifen since 2019. Tx care from clinic 2019\par She is tolerating tamoxifen well with tolerable hot flashes. Good compliance. She denies mood changes, vaginal dryness, SOB, chest pain, leg swelling or clotting issues. Her energy and appetite is good. Gained 2 lbs since last visit, still overwt\par LMP date 2018,  no intermittent spotting, no vaginal discharge. She hasn’t been using contraception, was reminded to use condoms. Chemically premenopausal 2019\par GYN - [facility] clinic, doesn’t remember name of GYN. Last seen 2019\par Breast imagin2020, BIRADS 3, 6 m f/u. Follows with Dr Mazariegos\par Opthal:10/2018 [FreeTextEntry1] : tamoxifen started 1/2019

## 2020-06-17 NOTE — REASON FOR VISIT
[Follow-Up Visit] : a follow-up [Other: _____] : [unfilled] [[employer] ] : provided by [employer]   [Family Member] : family member [FreeTextEntry1] : [id number] [TWNoteComboBox1] : Japanese [FreeTextEntry2] : reji

## 2020-06-17 NOTE — OB HISTORY
Constitution: No Fever or chills  Eyes: No visual changes  HEENT: No URI symptoms  Cardio: No Chest pain  Resp: +cough. No SOB  GI: +vomiting. No abdominal pain  : No dysuria  MSK: No Back pain  Neuro: No Headache  Skin: No rashes  All other ROS as per HPI  Adithya Zavala, PGY-1
[Definite:  ___ (Date)] : the last menstrual period was [unfilled]

## 2020-06-17 NOTE — ASSESSMENT
[Curative] : Goals of care discussed with patient: Curative [FreeTextEntry1] : Ms. GIUSEPPE WYNN is a 49 yo female w/ pmhx stage 1A (A6wV2W2) invasive ductal carcinoma breast ER/AR +, HER2 neg s/p right lumpectomy and adjuvant radiation.  oncotype 16. Started on tamoxifen January 2019.  \par \par - Breast ca: She is tolerating tamoxifen very well without significant side effects. Reports good compliance. Plan to continue tamoxifen for 10 years. Will switch to AI after menopause. GYN and opthal f/u annually due to risk of endometrial ca and cataracts respectively.  Up to date with Breast imaging.\par -  Mild hot flashes: 2/2 tamoxifen.  Advised to wear layers and use fan prn. Consider Effexor if get worse.\par - Wt gain:Life style modifications, healthy diet and exercise d/w her\par - Irregular periods: 2/2 perimenopause. Annual GYN f/u. She will continue to use non-hormonal contraception. Recehck FSH/estradiol today\par - Patient is aware of signs and symptoms of DVT/PE. Patient will report if she develops acute onset chest pain shortness of breath, leg swelling or calf pain. \par - She has HTN, well controlled. Rec to start ASA for CVA/VTE ppx. \par RTC 6 m.

## 2020-06-18 ENCOUNTER — APPOINTMENT (OUTPATIENT)
Dept: ORTHOPEDIC SURGERY | Facility: CLINIC | Age: 50
End: 2020-06-18
Payer: COMMERCIAL

## 2020-06-18 VITALS
DIASTOLIC BLOOD PRESSURE: 84 MMHG | BODY MASS INDEX: 37.82 KG/M2 | SYSTOLIC BLOOD PRESSURE: 137 MMHG | WEIGHT: 227 LBS | HEART RATE: 79 BPM | HEIGHT: 65 IN | TEMPERATURE: 98.3 F

## 2020-06-18 LAB
25(OH)D3 SERPL-MCNC: 36.9 NG/ML
ALBUMIN SERPL ELPH-MCNC: 4.3 G/DL
ALP BLD-CCNC: 72 U/L
ALT SERPL-CCNC: 13 U/L
ANION GAP SERPL CALC-SCNC: 17 MMOL/L
AST SERPL-CCNC: 13 U/L
BILIRUB SERPL-MCNC: 0.2 MG/DL
BUN SERPL-MCNC: 15 MG/DL
CALCIUM SERPL-MCNC: 9.8 MG/DL
CHLORIDE SERPL-SCNC: 102 MMOL/L
CO2 SERPL-SCNC: 22 MMOL/L
CREAT SERPL-MCNC: 0.72 MG/DL
ESTRADIOL SERPL-MCNC: 5 PG/ML
FSH SERPL-MCNC: 10.9 IU/L
GLUCOSE SERPL-MCNC: 300 MG/DL
POTASSIUM SERPL-SCNC: 4.3 MMOL/L
PROT SERPL-MCNC: 6.6 G/DL
SODIUM SERPL-SCNC: 141 MMOL/L

## 2020-06-18 PROCEDURE — 73562 X-RAY EXAM OF KNEE 3: CPT | Mod: RT

## 2020-06-18 PROCEDURE — 99204 OFFICE O/P NEW MOD 45 MIN: CPT | Mod: 25

## 2020-06-18 PROCEDURE — 20610 DRAIN/INJ JOINT/BURSA W/O US: CPT | Mod: LT

## 2020-06-18 NOTE — PHYSICAL EXAM
[de-identified] : Bilateral Knee Physical Examination:\par \par General: Alert and oriented x3.  In no acute distress.  Pleasant in nature with a normal affect.  No apparent respiratory distress. \par \par Erythema, Warmth, Rubor: Negative\par Swelling/Edema: No swelling present\par ROM: 0 to 120 degrees\par Reg's Test: Negative \par Medial Joint Line TTP: Positive bilaterally\par Lateral Joint Line TTP: Positive bilaterally\par Lachman Exam/Anterior Drawer/Pivot Shift Test: Negative \par MCL Pain: Negative\par LCL Pain: Negative\par Iliotibial Band Pain: Negative\par Patellofemoral Joint Pain: Negative\par Patellar Tendon TTP: Negative\par Pes Anserinus TTP: Negative\par Homans Sign: Negative\par Posterior Knee Pain: Negative\par Neuro: Intact with no sensory or motor deficits\par  [de-identified] : X-rays of the bilateral knee taken in office today and reviewed with the patient showed: Osteoarthritis present.

## 2020-06-18 NOTE — HISTORY OF PRESENT ILLNESS
[FreeTextEntry1] : Pt is a 49F with her niece for translation purposes with 3 years of bilateral knee pain.  They both hurt equally.  She is employed in a factory.  No trauma related to this complaint.  She denies any fever/chills or NS.  Not a work related injury or no fault injury.

## 2020-06-18 NOTE — REASON FOR VISIT
[Initial Visit] : an initial visit for [FreeTextEntry2] : Bilateral knee pain, left worse than right.

## 2020-06-18 NOTE — DISCUSSION/SUMMARY
[de-identified] : Assessment: Left Knee Osteoarthritis/Pain\par \par Plan:\par 1. RICE Therapy.\par 2. Antiinflammatories/Tylenol as needed for pain of discomfort. \par 3. The patient was advised to rest the knee for 24-48 hours post injection.  The patient is able to resume normal activities in 24-48 hours post injection if the knee feels ok.\par 4. Continue with a home exercise/stretching program. \par 5. All the patients questions were answered.  If the patient is experiencing any problems or has concerns they were advised to call the office or make an appointment to come in to be evaluated.\par \par

## 2020-06-18 NOTE — DISCUSSION/SUMMARY
[de-identified] : Assessment: Left Knee Osteoarthritis/Pain\par \par Plan:\par 1. RICE Therapy.\par 2. Antiinflammatories/Tylenol as needed for pain of discomfort. \par 3. The patient was advised to rest the knee for 24-48 hours post injection.  The patient is able to resume normal activities in 24-48 hours post injection if the knee feels ok.\par 4. Continue with a home exercise/stretching program. \par 5. All the patients questions were answered.  If the patient is experiencing any problems or has concerns they were advised to call the office or make an appointment to come in to be evaluated.\par \par

## 2020-06-18 NOTE — PROCEDURE
[FreeTextEntry1] : Laterality: Left Knee\par \par The risks and benefits of the injection were reviewed with the patient today in office and all their questions were answered.  The injection site was the anterolateral aspect of the knee with the patient sitting up, knees flexed to 90 degrees.  Prior to giving the injection the injection site was prepped with Chloraprep and a sterile field was created.  Sterile technique was carried out throughout the procedure.  After verbal consent from the patient we went ahead and injected the left knee today with 1 ml 40 mg Depo-Medrol, 5 ml 1% lidocaine and 4 ml of .50% Bupivacaine for a total of 10 ml with a 22 lynn 1.5" needle.  The medication was placed into the knee without complication.  Post injection the patient reported no pain, had a normal gait and good motion of the knee.  The patient denied numbness and tingling going down their leg.  There were no complications during the procedure.\par

## 2020-06-18 NOTE — PHYSICAL EXAM
[de-identified] : Bilateral Knee Physical Examination:\par \par General: Alert and oriented x3.  In no acute distress.  Pleasant in nature with a normal affect.  No apparent respiratory distress. \par \par Erythema, Warmth, Rubor: Negative\par Swelling/Edema: No swelling present\par ROM: 0 to 120 degrees\par Reg's Test: Negative \par Medial Joint Line TTP: Positive bilaterally\par Lateral Joint Line TTP: Positive bilaterally\par Lachman Exam/Anterior Drawer/Pivot Shift Test: Negative \par MCL Pain: Negative\par LCL Pain: Negative\par Iliotibial Band Pain: Negative\par Patellofemoral Joint Pain: Negative\par Patellar Tendon TTP: Negative\par Pes Anserinus TTP: Negative\par Homans Sign: Negative\par Posterior Knee Pain: Negative\par Neuro: Intact with no sensory or motor deficits\par  [de-identified] : X-rays of the bilateral knee taken in office today and reviewed with the patient showed: Osteoarthritis present.

## 2020-06-19 ENCOUNTER — APPOINTMENT (OUTPATIENT)
Dept: RADIOLOGY | Facility: CLINIC | Age: 50
End: 2020-06-19
Payer: COMMERCIAL

## 2020-06-19 ENCOUNTER — OUTPATIENT (OUTPATIENT)
Dept: OUTPATIENT SERVICES | Facility: HOSPITAL | Age: 50
LOS: 1 days | End: 2020-06-19
Payer: COMMERCIAL

## 2020-06-19 DIAGNOSIS — Z98.890 OTHER SPECIFIED POSTPROCEDURAL STATES: Chronic | ICD-10-CM

## 2020-06-19 DIAGNOSIS — Z00.8 ENCOUNTER FOR OTHER GENERAL EXAMINATION: ICD-10-CM

## 2020-06-19 DIAGNOSIS — Z98.51 TUBAL LIGATION STATUS: Chronic | ICD-10-CM

## 2020-06-19 PROCEDURE — 73564 X-RAY EXAM KNEE 4 OR MORE: CPT

## 2020-06-19 PROCEDURE — 73564 X-RAY EXAM KNEE 4 OR MORE: CPT | Mod: 26,50

## 2020-06-25 ENCOUNTER — APPOINTMENT (OUTPATIENT)
Dept: ORTHOPEDIC SURGERY | Facility: CLINIC | Age: 50
End: 2020-06-25
Payer: COMMERCIAL

## 2020-06-25 PROCEDURE — 99214 OFFICE O/P EST MOD 30 MIN: CPT | Mod: 25

## 2020-06-25 PROCEDURE — 20610 DRAIN/INJ JOINT/BURSA W/O US: CPT | Mod: RT

## 2020-06-25 NOTE — PROCEDURE
[de-identified] : Laterality: Right Knee\par \par The risks and benefits of the injection were reviewed with the patient today in office and all their questions were answered.  The injection site was the anterolateral aspect of the knee with the patient sitting up, knees flexed to 90 degrees.  Prior to giving the injection the injection site was prepped with Chloraprep and a sterile field was created.  Sterile technique was carried out throughout the procedure.  After verbal consent from the patient we went ahead and injected the right knee today with 1 ml 40 mg Depo-Medrol, 5 ml 1% lidocaine and 4 ml of .50% Bupivacaine for a total of 10 ml with a 22 lynn 1.5" needle.  The medication was placed into the knee without complication.  Post injection the patient reported no pain, had a normal gait and good motion of the knee.  The patient denied numbness and tingling going down their leg.  There were no complications during the procedure.\par

## 2020-06-25 NOTE — HISTORY OF PRESENT ILLNESS
[de-identified] : Selam is a 49-year-old female who presents with her daughter today in the office for an evaluation of her bilateral knees more so right knee than left knee. Her pain scale on her right knee as an 8/10. Her pain scale and her left knee is 3/10. She denies locking or catching of both knees. She denies fevers, chills, night sweats, shortness of breath. She has no other complaints in the office today.  She had no trauma to the knee.

## 2020-06-25 NOTE — REASON FOR VISIT
[Initial Visit] : an initial visit for [FreeTextEntry2] : Bilateral knee pain, right worse then left her

## 2020-06-25 NOTE — DISCUSSION/SUMMARY
[de-identified] : Assessment: Right Knee Osteoarthritis/Pain\par \par Plan:\par 1. RICE Therapy.\par 2. Antiinflammatories/Tylenol as needed for pain of discomfort. \par 3. The patient was advised to rest the knee for 24-48 hours post injection.  The patient is able to resume normal activities in 24-48 hours post injection if the knee feels ok.\par 4. Continue with a home exercise/stretching program. \par 5. All the patients questions were answered.  If the patient is experiencing any problems or has concerns they were advised to call the office or make an appointment to come in to be evaluated.\par \par

## 2020-06-25 NOTE — PHYSICAL EXAM
[de-identified] : Bilateral Knee Physical Examination:\par \par General: Alert and oriented x3.  In no acute distress.  Pleasant in nature with a normal affect.  No apparent respiratory distress. \par \par Erythema, Warmth, Rubor: Negative\par Swelling/Edema: Mild swelling of the right knee.\par ROM: 0-120 degrees\par Reg's Test: Negative \par Medial Joint Line TTP: Positive bilaterally\par Lateral Joint Line TTP: Negative\par Lachman Exam/Anterior Drawer/Pivot Shift Test: Negative \par MCL Pain: Negative\par LCL Pain: Negative\par Iliotibial Band Pain: Negative\par Patellofemoral Joint Pain: Negative\par Patellar Tendon TTP: Negative\par Pes Anserinus TTP: Negative\par Homans Sign: Negative\par Posterior Knee Pain: Negative\par Neuro: Intact with no sensory or motor deficits\par  [de-identified] : Previous x-rays from an outside facility of her bilateral knees showed osteoarthritis. There were no fractures or dislocations present.

## 2020-07-16 ENCOUNTER — OUTPATIENT (OUTPATIENT)
Dept: OUTPATIENT SERVICES | Facility: HOSPITAL | Age: 50
LOS: 1 days | Discharge: HOME | End: 2020-07-16

## 2020-07-16 DIAGNOSIS — Z98.890 OTHER SPECIFIED POSTPROCEDURAL STATES: Chronic | ICD-10-CM

## 2020-07-16 DIAGNOSIS — Z98.51 TUBAL LIGATION STATUS: Chronic | ICD-10-CM

## 2020-07-16 DIAGNOSIS — C50.919 MALIGNANT NEOPLASM OF UNSPECIFIED SITE OF UNSPECIFIED FEMALE BREAST: ICD-10-CM

## 2020-07-17 LAB — HM MAMMOGRAPHY BILATERAL: NORMAL

## 2020-07-20 ENCOUNTER — APPOINTMENT (OUTPATIENT)
Dept: HEMATOLOGY ONCOLOGY | Facility: CLINIC | Age: 50
End: 2020-07-20

## 2020-11-02 ENCOUNTER — NON-APPOINTMENT (OUTPATIENT)
Age: 50
End: 2020-11-02

## 2020-11-30 ENCOUNTER — OUTPATIENT (OUTPATIENT)
Dept: OUTPATIENT SERVICES | Facility: HOSPITAL | Age: 50
LOS: 1 days | End: 2020-11-30
Payer: COMMERCIAL

## 2020-11-30 ENCOUNTER — APPOINTMENT (OUTPATIENT)
Dept: MAMMOGRAPHY | Facility: CLINIC | Age: 50
End: 2020-11-30
Payer: COMMERCIAL

## 2020-11-30 DIAGNOSIS — Z00.8 ENCOUNTER FOR OTHER GENERAL EXAMINATION: ICD-10-CM

## 2020-11-30 DIAGNOSIS — Z98.890 OTHER SPECIFIED POSTPROCEDURAL STATES: Chronic | ICD-10-CM

## 2020-11-30 DIAGNOSIS — Z98.51 TUBAL LIGATION STATUS: Chronic | ICD-10-CM

## 2020-11-30 PROCEDURE — 77066 DX MAMMO INCL CAD BI: CPT

## 2020-11-30 PROCEDURE — 77066 DX MAMMO INCL CAD BI: CPT | Mod: 26

## 2020-12-02 ENCOUNTER — OUTPATIENT (OUTPATIENT)
Dept: OUTPATIENT SERVICES | Facility: HOSPITAL | Age: 50
LOS: 1 days | Discharge: HOME | End: 2020-12-02

## 2020-12-02 ENCOUNTER — APPOINTMENT (OUTPATIENT)
Dept: SURGICAL ONCOLOGY | Facility: CLINIC | Age: 50
End: 2020-12-02
Payer: COMMERCIAL

## 2020-12-02 VITALS
SYSTOLIC BLOOD PRESSURE: 129 MMHG | WEIGHT: 221 LBS | HEIGHT: 63 IN | RESPIRATION RATE: 18 BRPM | OXYGEN SATURATION: 96 % | TEMPERATURE: 98.1 F | DIASTOLIC BLOOD PRESSURE: 84 MMHG | BODY MASS INDEX: 39.16 KG/M2 | HEART RATE: 77 BPM

## 2020-12-02 DIAGNOSIS — Z98.51 TUBAL LIGATION STATUS: Chronic | ICD-10-CM

## 2020-12-02 DIAGNOSIS — C50.919 MALIGNANT NEOPLASM OF UNSPECIFIED SITE OF UNSPECIFIED FEMALE BREAST: ICD-10-CM

## 2020-12-02 DIAGNOSIS — Z98.890 OTHER SPECIFIED POSTPROCEDURAL STATES: Chronic | ICD-10-CM

## 2020-12-02 PROCEDURE — 99072 ADDL SUPL MATRL&STAF TM PHE: CPT

## 2020-12-02 PROCEDURE — 99215 OFFICE O/P EST HI 40 MIN: CPT

## 2020-12-02 NOTE — ASSESSMENT
[FreeTextEntry1] : Stage I right breast cancer \par JOSE\par S/p lumpectomy and adjuvant radiation therapy\par Normal physical examination\par Diagnostic left mammogram and breast US 11/3/2020 - stable calcifications\par Continue hormonal tx as per Dr. Li\par RTO 6 mos after yearly bilateral breast imaging

## 2020-12-02 NOTE — PHYSICAL EXAM
[Normal] : supple, no neck mass and thyroid not enlarged [Normal Neck Lymph Nodes] : normal neck lymph nodes  [Normal Supraclavicular Lymph Nodes] : normal supraclavicular lymph nodes [Normal Groin Lymph Nodes] : normal groin lymph nodes [Normal Axillary Lymph Nodes] : normal axillary lymph nodes [Normal] : oriented to person, place and time, with appropriate affect [de-identified] : Right breast lumpectomy and axillary scars well healed. mild post radiation changes. no masses or adenopathy bilaterally.

## 2020-12-02 NOTE — HISTORY OF PRESENT ILLNESS
[de-identified] : 49 y/o female presents for a 6 month follow up exam.  used.\par She is s/p right breast lumpectomy post Lali  reflector placement, right axillary/sentinel node biopsy on 7/17/18. 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  She continues medical oncology surveillance with Dr. Li. \par \par Mammogram (11/3/20) revealed bilateral stable probable benign calcifications. Follow-up diagnostic mammogram in 6 months is recommended at the time of annual exam. BI-RADS 3. \par \par Bilateral MMG/US 3/26/19: multiple bilateral cysts. No evidence of malignancy. BIRADS-2.  \par US Pelvic & Transvaginal (7/13/19): Normal pelvic sonogram.\par \par Most recent bilateral mammogram performed in May 2020 demonstrated bilateral probable benign calcifications for which a follow-up diagnostic mammogram in 6 months is advised (BIRADS 3).  No US performed.\par \par \par Denies palpable breast masses, nipple discharge, nipple retraction/inversion, or skin changes. \par Denies any previous breast biopsies.\par Denies breast pain. \par Denies constitutional symptoms. \par Denies fever or chills. \par No family history of breast or ovarian cancer.

## 2020-12-02 NOTE — ADDENDUM
[FreeTextEntry1] : I, Mercedes Ndiaye, acted solely as a scribe for Dr. Luis Mazariegos on this date 12/02/2020.\par

## 2020-12-07 ENCOUNTER — APPOINTMENT (OUTPATIENT)
Dept: HEMATOLOGY ONCOLOGY | Facility: CLINIC | Age: 50
End: 2020-12-07

## 2020-12-30 ENCOUNTER — RESULT REVIEW (OUTPATIENT)
Age: 50
End: 2020-12-30

## 2020-12-30 ENCOUNTER — APPOINTMENT (OUTPATIENT)
Dept: HEMATOLOGY ONCOLOGY | Facility: CLINIC | Age: 50
End: 2020-12-30
Payer: COMMERCIAL

## 2020-12-30 VITALS
RESPIRATION RATE: 18 BRPM | WEIGHT: 216.05 LBS | BODY MASS INDEX: 38.28 KG/M2 | HEART RATE: 78 BPM | SYSTOLIC BLOOD PRESSURE: 118 MMHG | DIASTOLIC BLOOD PRESSURE: 82 MMHG | OXYGEN SATURATION: 96 % | TEMPERATURE: 97.9 F | HEIGHT: 62.99 IN

## 2020-12-30 DIAGNOSIS — R63.5 ABNORMAL WEIGHT GAIN: ICD-10-CM

## 2020-12-30 LAB
BASOPHILS # BLD AUTO: 0.11 K/UL — SIGNIFICANT CHANGE UP (ref 0–0.2)
BASOPHILS NFR BLD AUTO: 0.7 % — SIGNIFICANT CHANGE UP (ref 0–2)
EOSINOPHIL # BLD AUTO: 0.32 K/UL — SIGNIFICANT CHANGE UP (ref 0–0.5)
EOSINOPHIL NFR BLD AUTO: 2 % — SIGNIFICANT CHANGE UP (ref 0–6)
HCT VFR BLD CALC: 45.7 % — HIGH (ref 34.5–45)
HGB BLD-MCNC: 15.3 G/DL — SIGNIFICANT CHANGE UP (ref 11.5–15.5)
IMM GRANULOCYTES NFR BLD AUTO: 1.4 % — SIGNIFICANT CHANGE UP (ref 0–1.5)
LYMPHOCYTES # BLD AUTO: 27.5 % — SIGNIFICANT CHANGE UP (ref 13–44)
LYMPHOCYTES # BLD AUTO: 4.5 K/UL — HIGH (ref 1–3.3)
MCHC RBC-ENTMCNC: 31 PG — SIGNIFICANT CHANGE UP (ref 27–34)
MCHC RBC-ENTMCNC: 33.5 G/DL — SIGNIFICANT CHANGE UP (ref 32–36)
MCV RBC AUTO: 92.7 FL — SIGNIFICANT CHANGE UP (ref 80–100)
MONOCYTES # BLD AUTO: 1.06 K/UL — HIGH (ref 0–0.9)
MONOCYTES NFR BLD AUTO: 6.5 % — SIGNIFICANT CHANGE UP (ref 2–14)
NEUTROPHILS # BLD AUTO: 10.17 K/UL — HIGH (ref 1.8–7.4)
NEUTROPHILS NFR BLD AUTO: 61.9 % — SIGNIFICANT CHANGE UP (ref 43–77)
NRBC # BLD: 0 /100 WBCS — SIGNIFICANT CHANGE UP (ref 0–0)
PLATELET # BLD AUTO: 269 K/UL — SIGNIFICANT CHANGE UP (ref 150–400)
RBC # BLD: 4.93 M/UL — SIGNIFICANT CHANGE UP (ref 3.8–5.2)
RBC # FLD: 13.9 % — SIGNIFICANT CHANGE UP (ref 10.3–14.5)
WBC # BLD: 16.39 K/UL — HIGH (ref 3.8–10.5)
WBC # FLD AUTO: 16.39 K/UL — HIGH (ref 3.8–10.5)

## 2020-12-30 PROCEDURE — 99214 OFFICE O/P EST MOD 30 MIN: CPT

## 2020-12-30 PROCEDURE — 99072 ADDL SUPL MATRL&STAF TM PHE: CPT

## 2021-01-03 NOTE — REASON FOR VISIT
[Follow-Up Visit] : a follow-up [Other: _____] : [unfilled] [FreeTextEntry2] : Stage 1A breast Cancer  [TWNoteComboBox1] : Swedish

## 2021-01-03 NOTE — ASSESSMENT
[Curative] : Goals of care discussed with patient: Curative [FreeTextEntry1] : Ms. GIUSEPPE WYNN is a 47 yo female w/ pmhx stage 1A (F0uV3M5) invasive ductal carcinoma breast ER/MT +, HER2 neg s/p right lumpectomy and adjuvant radiation.  oncotype 16. Started on tamoxifen January 2019.  \par \par - Breast ca: She is tolerating tamoxifen very well without significant side effects. Reports good compliance. Plan to continue tamoxifen for 10 years. Will switch to AI after menopause.FSH still low. GYN and opthal f/u annually due to risk of endometrial ca and cataracts respectively.  Up to date with Breast imaging. 11/2020 BIRADS 3\par -  Mild hot flashes: 2/2 tamoxifen.  Advised to wear layers and use fan prn. Consider Effexor if get worse.\par - Wt gain:Life style modifications, healthy diet and exercise d/w her\par - Irregular periods: 2/2 perimenopause. Annual GYN f/u. She will continue to use non-hormonal contraception. Recehck FSH/estradiol prn\par - Patient is aware of signs and symptoms of DVT/PE. Patient will report if she develops acute onset chest pain shortness of breath, leg swelling or calf pain. \par - She has HTN, well controlled. Rec to start ASA for CVA/VTE ppx. \par RTC 6 m. \par D/w Dr. Gina Li\par

## 2021-01-03 NOTE — HISTORY OF PRESENT ILLNESS
[Disease: _____________________] : Disease: [unfilled] [T: ___] : T[unfilled] [N: ___] : N[unfilled] [M: ___] : M[unfilled] [AJCC Stage: ____] : AJCC Stage: [unfilled] [de-identified] : Pt is a 48 yo female w/ pmhx HTN and DMII who initially presented with abnormal right mammogram and breast mass in 3/2018. US guided right breast core biopsy in 4/2018 showed infiltrating ductal carcinoma of 1 cm mass. She was seen by surgical oncology. She underwent right lumpectomy and Right sentinal node biopsy in 7/2018 which revealed 1.2 cm  moderately differentiated ductal carcinoma with [place] score 6/9. ER 80-90% positive, SC 20-25% positive. Her-2 was equivocal with negative FISH. 1.2 cm mass. 3 sentinel nodes were negative. Stage 1A- [id number]. \par OncotypeDx is 16 \par RT delayed 2/2 insurance issues\par \par Completed Radiation therapy 12/11/18\par tamoxifen started 1/2019 [de-identified] : moderately differentiated ductal carcinoma  [de-identified] : ER 80-90%, NE 20-25%, HER2 negative  [de-identified] : - genetic testing - 17 gene testing all came back negative. No further testing needed. [FreeTextEntry1] : tamoxifen started 1/2019 [de-identified] : Ms. GIUSEPPE WYNN is here for a follow up visit today for stage I right breast ca dx in 2018, s/p RT and on Tamoxifen since 2019. Tx care from clinic 2019\par She is tolerating tamoxifen well with tolerable hot flashes. Good compliance. She denies mood changes, vaginal dryness, SOB, chest pain, leg swelling or clotting issues. Her energy and appetite is good. Gained 2 lbs since last visit, still overwt\par LMP date 2018,  no intermittent spotting, no vaginal discharge. She hasn’t been using contraception, was reminded to use condoms. Chemically premenopausal 2019\par GYN - [facility] clinic, doesn’t remember name of GYN. Last seen 2019 LMP 2018 unable to see GYN in  due to COVID.\par Breast imagin2020, BIRADS 3, 6 m f/u. Follows with Dr Mazariegos 2020 reviewed\par Opthal:2020

## 2021-01-03 NOTE — PHYSICAL EXAM
[Fully active, able to carry on all pre-disease performance without restriction] : Status 0 - Fully active, able to carry on all pre-disease performance without restriction [Obese] : obese [Normal] : affect appropriate [de-identified] : RIGHT BREAST lumpectomy and axillary scar, mild post RT changes

## 2021-01-03 NOTE — RESULTS/DATA
----- Message from Ruchi Weiss RN sent at 4/4/2017  1:54 PM CDT -----  Regarding: Candidias.  Pt states he was to follow up with someone for his throat infection (Candidias). He states he did receive one call and was told he was to see a Dr Ibarra?  He has not received a call.   He would like to followup with a doctor at [facility]. He is not sure where to go with this but wants Dr. Hemphill to know. He will also discuss further with him at next weeks appt.   He is wondering if he needs to make appt well in advance for this.  Pt can be reached at home phone unless he feels it could wait till next week at appt.   [FreeTextEntry1] : Laboratory data, radiology and pathology reviewed in detail at the time of visit.\par

## 2021-01-07 LAB
25(OH)D3 SERPL-MCNC: 41.5 NG/ML
ALBUMIN SERPL ELPH-MCNC: 4.3 G/DL
ALP BLD-CCNC: 75 U/L
ALT SERPL-CCNC: 7 U/L
ANION GAP SERPL CALC-SCNC: 17 MMOL/L
AST SERPL-CCNC: 12 U/L
BILIRUB SERPL-MCNC: 0.2 MG/DL
BUN SERPL-MCNC: 22 MG/DL
CALCIUM SERPL-MCNC: 10.3 MG/DL
CHLORIDE SERPL-SCNC: 98 MMOL/L
CO2 SERPL-SCNC: 24 MMOL/L
CREAT SERPL-MCNC: 1.19 MG/DL
GLUCOSE SERPL-MCNC: 138 MG/DL
POTASSIUM SERPL-SCNC: 4.6 MMOL/L
PROT SERPL-MCNC: 6.8 G/DL
SODIUM SERPL-SCNC: 140 MMOL/L

## 2021-01-19 ENCOUNTER — EMERGENCY (EMERGENCY)
Facility: HOSPITAL | Age: 51
LOS: 0 days | Discharge: ANOTHER HOSPITAL | End: 2021-01-19
Attending: EMERGENCY MEDICINE
Payer: COMMERCIAL

## 2021-01-19 ENCOUNTER — INPATIENT (INPATIENT)
Facility: HOSPITAL | Age: 51
LOS: 3 days | Discharge: HOME | End: 2021-01-23
Attending: HOSPITALIST | Admitting: HOSPITALIST
Payer: COMMERCIAL

## 2021-01-19 VITALS
OXYGEN SATURATION: 96 % | HEART RATE: 85 BPM | TEMPERATURE: 98 F | SYSTOLIC BLOOD PRESSURE: 114 MMHG | HEIGHT: 62 IN | DIASTOLIC BLOOD PRESSURE: 84 MMHG | WEIGHT: 222.01 LBS | RESPIRATION RATE: 16 BRPM

## 2021-01-19 VITALS
OXYGEN SATURATION: 99 % | HEART RATE: 90 BPM | RESPIRATION RATE: 17 BRPM | DIASTOLIC BLOOD PRESSURE: 62 MMHG | SYSTOLIC BLOOD PRESSURE: 100 MMHG

## 2021-01-19 VITALS — HEIGHT: 63 IN | WEIGHT: 220.02 LBS

## 2021-01-19 DIAGNOSIS — N81.10 CYSTOCELE, UNSPECIFIED: ICD-10-CM

## 2021-01-19 DIAGNOSIS — Z98.51 TUBAL LIGATION STATUS: Chronic | ICD-10-CM

## 2021-01-19 DIAGNOSIS — I10 ESSENTIAL (PRIMARY) HYPERTENSION: ICD-10-CM

## 2021-01-19 DIAGNOSIS — K14.8 OTHER DISEASES OF TONGUE: ICD-10-CM

## 2021-01-19 DIAGNOSIS — Z98.890 OTHER SPECIFIED POSTPROCEDURAL STATES: Chronic | ICD-10-CM

## 2021-01-19 DIAGNOSIS — Z79.84 LONG TERM (CURRENT) USE OF ORAL HYPOGLYCEMIC DRUGS: ICD-10-CM

## 2021-01-19 DIAGNOSIS — Z85.3 PERSONAL HISTORY OF MALIGNANT NEOPLASM OF BREAST: ICD-10-CM

## 2021-01-19 DIAGNOSIS — K12.2 CELLULITIS AND ABSCESS OF MOUTH: ICD-10-CM

## 2021-01-19 DIAGNOSIS — E11.9 TYPE 2 DIABETES MELLITUS WITHOUT COMPLICATIONS: ICD-10-CM

## 2021-01-19 LAB
ALBUMIN SERPL ELPH-MCNC: 3.2 G/DL — LOW (ref 3.3–5)
ALP SERPL-CCNC: 70 U/L — SIGNIFICANT CHANGE UP (ref 40–120)
ALT FLD-CCNC: 14 U/L — SIGNIFICANT CHANGE UP (ref 12–78)
ANION GAP SERPL CALC-SCNC: 10 MMOL/L — SIGNIFICANT CHANGE UP (ref 5–17)
AST SERPL-CCNC: 15 U/L — SIGNIFICANT CHANGE UP (ref 15–37)
BASOPHILS # BLD AUTO: 0 K/UL — SIGNIFICANT CHANGE UP (ref 0–0.2)
BASOPHILS NFR BLD AUTO: 0 % — SIGNIFICANT CHANGE UP (ref 0–2)
BILIRUB SERPL-MCNC: 0.3 MG/DL — SIGNIFICANT CHANGE UP (ref 0.2–1.2)
BLOOD GAS ARTERIAL COMPREHENSIVE RESULT: SIGNIFICANT CHANGE UP
BUN SERPL-MCNC: 14 MG/DL — SIGNIFICANT CHANGE UP (ref 7–23)
CALCIUM SERPL-MCNC: 8.4 MG/DL — LOW (ref 8.5–10.1)
CHLORIDE SERPL-SCNC: 109 MMOL/L — HIGH (ref 96–108)
CO2 SERPL-SCNC: 20 MMOL/L — LOW (ref 22–31)
CREAT SERPL-MCNC: 0.84 MG/DL — SIGNIFICANT CHANGE UP (ref 0.5–1.3)
EOSINOPHIL # BLD AUTO: 0.17 K/UL — SIGNIFICANT CHANGE UP (ref 0–0.5)
EOSINOPHIL NFR BLD AUTO: 1 % — SIGNIFICANT CHANGE UP (ref 0–6)
GLUCOSE BLDC GLUCOMTR-MCNC: 206 MG/DL — HIGH (ref 70–99)
GLUCOSE BLDC GLUCOMTR-MCNC: 235 MG/DL — HIGH (ref 70–99)
GLUCOSE SERPL-MCNC: 285 MG/DL — HIGH (ref 70–99)
HCT VFR BLD CALC: 44.4 % — SIGNIFICANT CHANGE UP (ref 34.5–45)
HGB BLD-MCNC: 14.4 G/DL — SIGNIFICANT CHANGE UP (ref 11.5–15.5)
LYMPHOCYTES # BLD AUTO: 1.69 K/UL — SIGNIFICANT CHANGE UP (ref 1–3.3)
LYMPHOCYTES # BLD AUTO: 10 % — LOW (ref 13–44)
MCHC RBC-ENTMCNC: 30.3 PG — SIGNIFICANT CHANGE UP (ref 27–34)
MCHC RBC-ENTMCNC: 32.4 GM/DL — SIGNIFICANT CHANGE UP (ref 32–36)
MCV RBC AUTO: 93.5 FL — SIGNIFICANT CHANGE UP (ref 80–100)
MONOCYTES # BLD AUTO: 0.17 K/UL — SIGNIFICANT CHANGE UP (ref 0–0.9)
MONOCYTES NFR BLD AUTO: 1 % — LOW (ref 2–14)
NEUTROPHILS # BLD AUTO: 13.99 K/UL — HIGH (ref 1.8–7.4)
NEUTROPHILS NFR BLD AUTO: 80 % — HIGH (ref 43–77)
NRBC # BLD: SIGNIFICANT CHANGE UP /100 WBCS (ref 0–0)
PLATELET # BLD AUTO: 356 K/UL — SIGNIFICANT CHANGE UP (ref 150–400)
POTASSIUM SERPL-MCNC: 3.3 MMOL/L — LOW (ref 3.5–5.3)
POTASSIUM SERPL-SCNC: 3.3 MMOL/L — LOW (ref 3.5–5.3)
PROT SERPL-MCNC: 7 GM/DL — SIGNIFICANT CHANGE UP (ref 6–8.3)
RBC # BLD: 4.75 M/UL — SIGNIFICANT CHANGE UP (ref 3.8–5.2)
RBC # FLD: 13.2 % — SIGNIFICANT CHANGE UP (ref 10.3–14.5)
SARS-COV-2 RNA SPEC QL NAA+PROBE: SIGNIFICANT CHANGE UP
SODIUM SERPL-SCNC: 139 MMOL/L — SIGNIFICANT CHANGE UP (ref 135–145)
WBC # BLD: 16.86 K/UL — HIGH (ref 3.8–10.5)
WBC # FLD AUTO: 16.86 K/UL — HIGH (ref 3.8–10.5)

## 2021-01-19 PROCEDURE — 99291 CRITICAL CARE FIRST HOUR: CPT

## 2021-01-19 PROCEDURE — 70491 CT SOFT TISSUE NECK W/DYE: CPT | Mod: 26

## 2021-01-19 PROCEDURE — 96375 TX/PRO/DX INJ NEW DRUG ADDON: CPT

## 2021-01-19 PROCEDURE — 36415 COLL VENOUS BLD VENIPUNCTURE: CPT

## 2021-01-19 PROCEDURE — 96374 THER/PROPH/DIAG INJ IV PUSH: CPT

## 2021-01-19 PROCEDURE — 85025 COMPLETE CBC W/AUTO DIFF WBC: CPT

## 2021-01-19 PROCEDURE — 71045 X-RAY EXAM CHEST 1 VIEW: CPT | Mod: 26

## 2021-01-19 PROCEDURE — 71045 X-RAY EXAM CHEST 1 VIEW: CPT | Mod: 26,77

## 2021-01-19 PROCEDURE — 87040 BLOOD CULTURE FOR BACTERIA: CPT

## 2021-01-19 PROCEDURE — 71045 X-RAY EXAM CHEST 1 VIEW: CPT

## 2021-01-19 PROCEDURE — 99285 EMERGENCY DEPT VISIT HI MDM: CPT | Mod: 25

## 2021-01-19 PROCEDURE — 80053 COMPREHEN METABOLIC PANEL: CPT

## 2021-01-19 PROCEDURE — 70491 CT SOFT TISSUE NECK W/DYE: CPT

## 2021-01-19 PROCEDURE — 96376 TX/PRO/DX INJ SAME DRUG ADON: CPT

## 2021-01-19 PROCEDURE — 96361 HYDRATE IV INFUSION ADD-ON: CPT

## 2021-01-19 PROCEDURE — 99285 EMERGENCY DEPT VISIT HI MDM: CPT

## 2021-01-19 RX ORDER — DEXTROSE 50 % IN WATER 50 %
25 SYRINGE (ML) INTRAVENOUS ONCE
Refills: 0 | Status: DISCONTINUED | OUTPATIENT
Start: 2021-01-19 | End: 2021-01-21

## 2021-01-19 RX ORDER — SODIUM CHLORIDE 9 MG/ML
1000 INJECTION INTRAMUSCULAR; INTRAVENOUS; SUBCUTANEOUS ONCE
Refills: 0 | Status: DISCONTINUED | OUTPATIENT
Start: 2021-01-19 | End: 2021-01-19

## 2021-01-19 RX ORDER — AMLODIPINE BESYLATE 2.5 MG/1
1 TABLET ORAL
Qty: 0 | Refills: 0 | DISCHARGE

## 2021-01-19 RX ORDER — INSULIN LISPRO 100/ML
VIAL (ML) SUBCUTANEOUS EVERY 6 HOURS
Refills: 0 | Status: DISCONTINUED | OUTPATIENT
Start: 2021-01-19 | End: 2021-01-21

## 2021-01-19 RX ORDER — CHLORHEXIDINE GLUCONATE 213 G/1000ML
15 SOLUTION TOPICAL EVERY 12 HOURS
Refills: 0 | Status: DISCONTINUED | OUTPATIENT
Start: 2021-01-19 | End: 2021-01-21

## 2021-01-19 RX ORDER — KETAMINE HYDROCHLORIDE 100 MG/ML
60 INJECTION INTRAMUSCULAR; INTRAVENOUS ONCE
Refills: 0 | Status: DISCONTINUED | OUTPATIENT
Start: 2021-01-19 | End: 2021-01-19

## 2021-01-19 RX ORDER — DEXAMETHASONE 0.5 MG/5ML
10 ELIXIR ORAL ONCE
Refills: 0 | Status: COMPLETED | OUTPATIENT
Start: 2021-01-19 | End: 2021-01-19

## 2021-01-19 RX ORDER — TAMOXIFEN CITRATE 20 MG/1
20 TABLET, FILM COATED ORAL DAILY
Refills: 0 | Status: DISCONTINUED | OUTPATIENT
Start: 2021-01-19 | End: 2021-01-22

## 2021-01-19 RX ORDER — SODIUM CHLORIDE 9 MG/ML
1000 INJECTION, SOLUTION INTRAVENOUS
Refills: 0 | Status: DISCONTINUED | OUTPATIENT
Start: 2021-01-19 | End: 2021-01-21

## 2021-01-19 RX ORDER — DEXTROSE 50 % IN WATER 50 %
15 SYRINGE (ML) INTRAVENOUS ONCE
Refills: 0 | Status: DISCONTINUED | OUTPATIENT
Start: 2021-01-19 | End: 2021-01-21

## 2021-01-19 RX ORDER — ROCURONIUM BROMIDE 10 MG/ML
50 VIAL (ML) INTRAVENOUS ONCE
Refills: 0 | Status: COMPLETED | OUTPATIENT
Start: 2021-01-19 | End: 2021-01-19

## 2021-01-19 RX ORDER — ENOXAPARIN SODIUM 100 MG/ML
40 INJECTION SUBCUTANEOUS EVERY 12 HOURS
Refills: 0 | Status: DISCONTINUED | OUTPATIENT
Start: 2021-01-19 | End: 2021-01-23

## 2021-01-19 RX ORDER — BENZTROPINE MESYLATE 1 MG
50 TABLET ORAL EVERY 8 HOURS
Refills: 0 | Status: DISCONTINUED | OUTPATIENT
Start: 2021-01-19 | End: 2021-01-19

## 2021-01-19 RX ORDER — SODIUM CHLORIDE 9 MG/ML
1000 INJECTION INTRAMUSCULAR; INTRAVENOUS; SUBCUTANEOUS ONCE
Refills: 0 | Status: COMPLETED | OUTPATIENT
Start: 2021-01-19 | End: 2021-01-19

## 2021-01-19 RX ORDER — PIPERACILLIN AND TAZOBACTAM 4; .5 G/20ML; G/20ML
3.38 INJECTION, POWDER, LYOPHILIZED, FOR SOLUTION INTRAVENOUS EVERY 8 HOURS
Refills: 0 | Status: DISCONTINUED | OUTPATIENT
Start: 2021-01-19 | End: 2021-01-20

## 2021-01-19 RX ORDER — MIDAZOLAM HYDROCHLORIDE 1 MG/ML
0.02 INJECTION, SOLUTION INTRAMUSCULAR; INTRAVENOUS
Qty: 100 | Refills: 0 | Status: DISCONTINUED | OUTPATIENT
Start: 2021-01-19 | End: 2021-01-20

## 2021-01-19 RX ORDER — DIPHENHYDRAMINE HCL 50 MG
50 CAPSULE ORAL ONCE
Refills: 0 | Status: COMPLETED | OUTPATIENT
Start: 2021-01-19 | End: 2021-01-19

## 2021-01-19 RX ORDER — DIAZEPAM 5 MG
5 TABLET ORAL ONCE
Refills: 0 | Status: DISCONTINUED | OUTPATIENT
Start: 2021-01-19 | End: 2021-01-19

## 2021-01-19 RX ORDER — DIPHENHYDRAMINE HCL 50 MG
2 CAPSULE ORAL
Qty: 42 | Refills: 0
Start: 2021-01-19 | End: 2021-01-25

## 2021-01-19 RX ORDER — FAMOTIDINE 10 MG/ML
20 INJECTION INTRAVENOUS EVERY 12 HOURS
Refills: 0 | Status: COMPLETED | OUTPATIENT
Start: 2021-01-19 | End: 2021-01-22

## 2021-01-19 RX ORDER — PROPOFOL 10 MG/ML
10 INJECTION, EMULSION INTRAVENOUS
Qty: 1000 | Refills: 0 | Status: DISCONTINUED | OUTPATIENT
Start: 2021-01-19 | End: 2021-01-21

## 2021-01-19 RX ORDER — PIPERACILLIN AND TAZOBACTAM 4; .5 G/20ML; G/20ML
3.38 INJECTION, POWDER, LYOPHILIZED, FOR SOLUTION INTRAVENOUS ONCE
Refills: 0 | Status: COMPLETED | OUTPATIENT
Start: 2021-01-19 | End: 2021-01-19

## 2021-01-19 RX ORDER — ACETAMINOPHEN 500 MG
1 TABLET ORAL
Qty: 0 | Refills: 0 | DISCHARGE

## 2021-01-19 RX ORDER — DEXAMETHASONE 0.5 MG/5ML
10 ELIXIR ORAL ONCE
Refills: 0 | Status: DISCONTINUED | OUTPATIENT
Start: 2021-01-19 | End: 2021-01-19

## 2021-01-19 RX ORDER — PROPOFOL 10 MG/ML
50 INJECTION, EMULSION INTRAVENOUS ONCE
Refills: 0 | Status: COMPLETED | OUTPATIENT
Start: 2021-01-19 | End: 2021-01-19

## 2021-01-19 RX ORDER — GLUCAGON INJECTION, SOLUTION 0.5 MG/.1ML
1 INJECTION, SOLUTION SUBCUTANEOUS ONCE
Refills: 0 | Status: DISCONTINUED | OUTPATIENT
Start: 2021-01-19 | End: 2021-01-21

## 2021-01-19 RX ORDER — DEXTROSE 50 % IN WATER 50 %
12.5 SYRINGE (ML) INTRAVENOUS ONCE
Refills: 0 | Status: DISCONTINUED | OUTPATIENT
Start: 2021-01-19 | End: 2021-01-21

## 2021-01-19 RX ORDER — CHLORHEXIDINE GLUCONATE 213 G/1000ML
1 SOLUTION TOPICAL
Refills: 0 | Status: DISCONTINUED | OUTPATIENT
Start: 2021-01-19 | End: 2021-01-21

## 2021-01-19 RX ORDER — DIPHENHYDRAMINE HCL 50 MG
50 CAPSULE ORAL EVERY 8 HOURS
Refills: 0 | Status: COMPLETED | OUTPATIENT
Start: 2021-01-19 | End: 2021-01-22

## 2021-01-19 RX ORDER — MIDAZOLAM HYDROCHLORIDE 1 MG/ML
2 INJECTION, SOLUTION INTRAMUSCULAR; INTRAVENOUS ONCE
Refills: 0 | Status: DISCONTINUED | OUTPATIENT
Start: 2021-01-19 | End: 2021-01-19

## 2021-01-19 RX ORDER — PROPOFOL 10 MG/ML
10 INJECTION, EMULSION INTRAVENOUS
Qty: 1000 | Refills: 0 | Status: DISCONTINUED | OUTPATIENT
Start: 2021-01-19 | End: 2021-01-19

## 2021-01-19 RX ORDER — PROPOFOL 10 MG/ML
10 INJECTION, EMULSION INTRAVENOUS ONCE
Refills: 0 | Status: COMPLETED | OUTPATIENT
Start: 2021-01-19 | End: 2021-01-19

## 2021-01-19 RX ORDER — MIDAZOLAM HYDROCHLORIDE 1 MG/ML
2.5 INJECTION, SOLUTION INTRAMUSCULAR; INTRAVENOUS
Refills: 0 | Status: DISCONTINUED | OUTPATIENT
Start: 2021-01-19 | End: 2021-01-19

## 2021-01-19 RX ORDER — MIDAZOLAM HYDROCHLORIDE 1 MG/ML
5 INJECTION, SOLUTION INTRAMUSCULAR; INTRAVENOUS ONCE
Refills: 0 | Status: DISCONTINUED | OUTPATIENT
Start: 2021-01-19 | End: 2021-01-19

## 2021-01-19 RX ORDER — MIDAZOLAM HYDROCHLORIDE 1 MG/ML
0.02 INJECTION, SOLUTION INTRAMUSCULAR; INTRAVENOUS
Qty: 100 | Refills: 0 | Status: DISCONTINUED | OUTPATIENT
Start: 2021-01-19 | End: 2021-01-19

## 2021-01-19 RX ORDER — FAMOTIDINE 10 MG/ML
20 INJECTION INTRAVENOUS ONCE
Refills: 0 | Status: COMPLETED | OUTPATIENT
Start: 2021-01-19 | End: 2021-01-19

## 2021-01-19 RX ORDER — DEXAMETHASONE 0.5 MG/5ML
10 ELIXIR ORAL EVERY 6 HOURS
Refills: 0 | Status: DISCONTINUED | OUTPATIENT
Start: 2021-01-19 | End: 2021-01-20

## 2021-01-19 RX ADMIN — MIDAZOLAM HYDROCHLORIDE 2.01 MG/KG/HR: 1 INJECTION, SOLUTION INTRAMUSCULAR; INTRAVENOUS at 16:17

## 2021-01-19 RX ADMIN — MIDAZOLAM HYDROCHLORIDE 2.5 MILLIGRAM(S): 1 INJECTION, SOLUTION INTRAMUSCULAR; INTRAVENOUS at 11:55

## 2021-01-19 RX ADMIN — PIPERACILLIN AND TAZOBACTAM 200 GRAM(S): 4; .5 INJECTION, POWDER, LYOPHILIZED, FOR SOLUTION INTRAVENOUS at 16:16

## 2021-01-19 RX ADMIN — CHLORHEXIDINE GLUCONATE 1 APPLICATION(S): 213 SOLUTION TOPICAL at 16:16

## 2021-01-19 RX ADMIN — PROPOFOL 6.04 MICROGRAM(S)/KG/MIN: 10 INJECTION, EMULSION INTRAVENOUS at 22:13

## 2021-01-19 RX ADMIN — CHLORHEXIDINE GLUCONATE 15 MILLILITER(S): 213 SOLUTION TOPICAL at 17:53

## 2021-01-19 RX ADMIN — PROPOFOL 10 MILLIGRAM(S): 10 INJECTION, EMULSION INTRAVENOUS at 11:25

## 2021-01-19 RX ADMIN — SODIUM CHLORIDE 1000 MILLILITER(S): 9 INJECTION INTRAMUSCULAR; INTRAVENOUS; SUBCUTANEOUS at 06:28

## 2021-01-19 RX ADMIN — PROPOFOL 5.99 MICROGRAM(S)/KG/MIN: 10 INJECTION, EMULSION INTRAVENOUS at 10:20

## 2021-01-19 RX ADMIN — Medication 102 MILLIGRAM(S): at 08:44

## 2021-01-19 RX ADMIN — KETAMINE HYDROCHLORIDE 60 MILLIGRAM(S): 100 INJECTION INTRAMUSCULAR; INTRAVENOUS at 09:55

## 2021-01-19 RX ADMIN — Medication 2: at 23:10

## 2021-01-19 RX ADMIN — Medication 2: at 17:53

## 2021-01-19 RX ADMIN — MIDAZOLAM HYDROCHLORIDE 5 MILLIGRAM(S): 1 INJECTION, SOLUTION INTRAMUSCULAR; INTRAVENOUS at 11:58

## 2021-01-19 RX ADMIN — Medication 125 MILLIGRAM(S): at 05:32

## 2021-01-19 RX ADMIN — PROPOFOL 50 MILLIGRAM(S): 10 INJECTION, EMULSION INTRAVENOUS at 11:25

## 2021-01-19 RX ADMIN — FAMOTIDINE 20 MILLIGRAM(S): 10 INJECTION INTRAVENOUS at 05:32

## 2021-01-19 RX ADMIN — MIDAZOLAM HYDROCHLORIDE 2.01 MG/KG/HR: 1 INJECTION, SOLUTION INTRAMUSCULAR; INTRAVENOUS at 22:13

## 2021-01-19 RX ADMIN — PROPOFOL 6.04 MICROGRAM(S)/KG/MIN: 10 INJECTION, EMULSION INTRAVENOUS at 16:17

## 2021-01-19 RX ADMIN — PIPERACILLIN AND TAZOBACTAM 25 GRAM(S): 4; .5 INJECTION, POWDER, LYOPHILIZED, FOR SOLUTION INTRAVENOUS at 22:07

## 2021-01-19 RX ADMIN — Medication 50 MILLIGRAM(S): at 09:55

## 2021-01-19 RX ADMIN — PIPERACILLIN AND TAZOBACTAM 200 GRAM(S): 4; .5 INJECTION, POWDER, LYOPHILIZED, FOR SOLUTION INTRAVENOUS at 08:47

## 2021-01-19 RX ADMIN — MIDAZOLAM HYDROCHLORIDE 2 MG/KG/HR: 1 INJECTION, SOLUTION INTRAMUSCULAR; INTRAVENOUS at 12:17

## 2021-01-19 RX ADMIN — FAMOTIDINE 20 MILLIGRAM(S): 10 INJECTION INTRAVENOUS at 22:07

## 2021-01-19 RX ADMIN — Medication 102 MILLIGRAM(S): at 23:10

## 2021-01-19 RX ADMIN — Medication 50 MILLIGRAM(S): at 22:07

## 2021-01-19 RX ADMIN — SODIUM CHLORIDE 1000 MILLILITER(S): 9 INJECTION INTRAMUSCULAR; INTRAVENOUS; SUBCUTANEOUS at 05:28

## 2021-01-19 RX ADMIN — MIDAZOLAM HYDROCHLORIDE 2.5 MILLIGRAM(S): 1 INJECTION, SOLUTION INTRAMUSCULAR; INTRAVENOUS at 12:05

## 2021-01-19 RX ADMIN — Medication 50 MILLIGRAM(S): at 05:32

## 2021-01-19 NOTE — CHART NOTE - NSCHARTNOTEFT_GEN_A_CORE
s [facility]  Head & Neck Surgery Procedure Note    Name:                  PappasSelam roman	               Surgeon:   Jose Angel Patton MD	  				  Date of Procedure: 01/19/2021                       Assistant:  None    Record Number:	[id number]                              Anesthesia:  Topical Anesthesia     Preoperative diagnosis:	Acute maxillary sinusitis, unspecified (J01.00);  Acute Pansinusitis (J01.40)    Postoperative diagnosis:	Same    Procedure:	              Bilateral maxillary sinus endoscopy  (16755)    INDICATION:  The patient is a 50 year old female with PMH of HTN (takes enalapril), DM, breast cancer diagnosed 2019, (on tamoxifen) who initially presented to [facility] ED complaining of swelling and pain to the tongue. Pt denied new meds and no new soaps/detergents/contact with anything. Reported the swelling started Tuesday and she was sent home from work. Over the weekend the symptoms somewhat improved. However overnight from monday to tuesday, she noticed tongue swelling with trouble talking. She had no temperature at home. She denies shortness of breath, fevers, chills. Last year after she was started on tamoxifen in 2019, she had an episode of facial swelling which resolved.  She denied new foods or environmental exposures that she knows of.  Pt was intubated at OSH and transferred to [facility] for care and evaluation ENT was consulted for evaluation.    PROCEDURE:  The patient was seen and her nasal cavities were prepped and sprayed with topical neosynephrine anesthesia.   Following this, a zero degree flexible endoscope was passed into the left nasal vault, and passed posteriorly to the nasopharynx.  There was no evidence of any blood emanating from the left posterior superior lateral nasal wall. The scope was then slowly withdrawn and then rotated superiorly to visualize the middle turbinate and the inferior meatus and osteomeatal complex. The OMC on the left side appeared patent with no evidence of pus or obstruction.  The Maxillary sinus on the left side was then accessed through the inferior meatus.  The maxillary sinus had mild to moderate amount of mucoserous fluid within it without any evidence of masses or lesions.  The frontal duct was then inspected and appeared clear without any mucoid material flowing from it.  The Septum appeared straight.  The scope was then slowly withdrawn.  The zero degree endoscope was then introduced into the right nasal cavity and passed posteriorly to the nasopharynx. There were no masses visible.  There was no evidence of any bleeding emanating from the right posterior septum.  The endoscope was then rotated superiorly to visualize the middle turbinate and the inferior meatus and osteomeatal complex. The Maxillary sinus on the right side was then accessed via the inferior meatus.  There was a minimal amount of mucoserous fluid within the maxillary sinus with no evidence of any masses or pus.  Again the septum appeared to be straight.  The scope was then withdrawn.  The patient tolerated the procedure well.

## 2021-01-19 NOTE — CONSULT NOTE ADULT - SUBJECTIVE AND OBJECTIVE BOX
HPI: 50 year old female with PMH of HTN (takes enalapril), DM, breast cancer diagnosed 2019, (on tamoxifen) who initially presented to [facility] ED complaining of swelling and pain to the tongue. However overnight from Monday to Tuesday, she noticed tongue swelling with trouble speaking. She denies shortness of breath, fevers, chills.  Pt was intubated at [facility] and transferred to [facility] MICU for care and evaluation.      PAST MEDICAL & SURGICAL HISTORY:  Malignant neoplasm of right female breast, unspecified estrogen receptor status, unspecified site of breast on tamoxifen  Uterine leiomyoma, unspecified location 2011  Bladder prolapse, female, acquired  Essential hypertension  Type 2 diabetes mellitus without complication, without long-term current use of insulin  Status post fine needle biopsy 7/16/18  H/O prior ablation treatment 2011  H/O tubal ligation 1997, bilateral  MEDICATIONS  (STANDING):  chlorhexidine 0.12% Liquid 15 milliLiter(s) Oral Mucosa every 12 hours  chlorhexidine 4% Liquid 1 Application(s) Topical <User Schedule>  dextrose 40% Gel 15 Gram(s) Oral once  dextrose 5%. 1000 milliLiter(s) (50 mL/Hr) IV Continuous <Continuous>  dextrose 5%. 1000 milliLiter(s) (100 mL/Hr) IV Continuous <Continuous>  dextrose 50% Injectable 25 Gram(s) IV Push once  dextrose 50% Injectable 12.5 Gram(s) IV Push once  dextrose 50% Injectable 25 Gram(s) IV Push once  glucagon  Injectable 1 milliGRAM(s) IntraMuscular once  insulin lispro (ADMELOG) corrective regimen sliding scale   SubCutaneous every 6 hours  midazolam Infusion 0.02 mG/kG/Hr (2.01 mL/Hr) IV Continuous <Continuous>  piperacillin/tazobactam IVPB.. 3.375 Gram(s) IV Intermittent every 8 hours  propofol Infusion 10 MICROgram(s)/kG/Min (6.04 mL/Hr) IV Continuous <Continuous>  tamoxifen 20 milliGRAM(s) Oral daily  Allergies: No Known Allergies      ICU Vital Signs Last 24 Hrs  T(C): 36.2 (19 Jan 2021 16:00), Max: 37.3 (19 Jan 2021 04:43)  T(F): 97.1 (19 Jan 2021 16:00), Max: 99.2 (19 Jan 2021 04:43)  HR: 69 (19 Jan 2021 18:17) (69 - 134)  BP: 97/61 (19 Jan 2021 16:00) (89/60 - 151/94)  BP(mean): 73 (19 Jan 2021 16:00) (73 - 98)  RR: 16 (19 Jan 2021 16:00) (16 - 20)  SpO2: 95% (19 Jan 2021 18:17) (94% - 100%)    LABS:                14.4   16.86 )-----------( 356      ( 19 Jan 2021 11:11 )             44.4     01-19    139  |  109<H>  |  14  ----------------------------<  285<H>  3.3<L>   |  20<L>  |  0.84    Ca    8.4<L>      19 Jan 2021 11:11    TPro  7.0  /  Alb  3.2<L>  /  TBili  0.3  /  DBili  x   /  AST  15  /  ALT  14  /  AlkPhos  70  01-19  LIVER FUNCTIONS - ( 19 Jan 2021 11:11 )  Alb: 3.2 g/dL / Pro: 7.0 gm/dL / ALK PHOS: 70 U/L / ALT: 14 U/L / AST: 15 U/L / GGT: x           I&O's Summary    19 Jan 2021 07:01  -  19 Jan 2021 18:32  --------------------------------------------------------  IN: 326.5 mL / OUT: 1250 mL / NET: -923.5 mL    CAPILLARY BLOOD GLUCOSE  POCT Blood Glucose.: 235 mg/dL (19 Jan 2021 17:21)      PHYSICAL EXAM:    ENT EXAM-   Constitutional: Sedated and intubated, on ventilator     Head:  normocephalic, atraumatic.   E/O: Soft swelling of the right and left mandibular body without significant erythema.. No appreciable TMJ tenderness; however patient is intubated and can not appreciate full range of motion and can not appreciate trismus.  I/O: teeth clinically show no gross caries. Tongue appears large and swollen. Firm to the touch. Floor of mouth soft, non-tender. Full dental examination unfeasible and uvula not visible due to tongue enragement. No purulence could be expressed on palpation. No mobility of adjacent teeth.  Adequate oral hygiene. FOM soft non- tender to palpation.  Neck:  Submental area full but soft to palpation. . Trachea midline.      CT  EXAM: CT NECK SOFT TISSUE IC  PROCEDURE DATE: 01/19/2021  INTERPRETATION: CLINICAL INFORMATION: Jose Rafael's angina.  TECHNIQUE: Contrast-enhanced CT of the neck was performed. Helically acquired images from the skull base to the aortic arch following the administration of intravenous contrast were reformatted in coronal and sagittal planes and viewed at bone and soft tissue windows.  90 cc of Omnipaque-350 were administered intravenously without complication and 10 cc were discarded.  COMPARISON: No similar prior studies available for comparison.  FINDINGS:  The tongue appears enlarged without evidence of focal mass. There is asymmetric enlargement of the left palatine tonsil with surrounding fat stranding suggesting tonsillitis. There is edema along the left oropharyngeal wall and in the floor of mouth and left submandibular space, suggesting cellulitis. No evidence of rim-enhancing fluid collection.  No enlarged cervical lymph nodes.  The parotid, submandibular and thyroid glands are within normal limits.  The vascular structures demonstrate normal enhancement.  There are no suspicious osteolytic or blastic lesions.  The visualized intracranial and intraorbital compartments fail to demonstrate abnormal enhancement, or mass effect.  The lung apices are within normal limits.    IMPRESSION:  The tongue appears enlarged without evidence of focal mass. There is asymmetric enlargement of the left palatine tonsil with surrounding fat stranding suggesting tonsillitis. There is edema along the left oropharyngeal wall and in the floor of mouth and left submandibular space, suggesting cellulitis. No evidence of rim-enhancing fluid collection.    TONNY ZHONG MD; Attending Radiologist  This document has been electronically signed. Jan 19 2021 10:16AM

## 2021-01-19 NOTE — CONSULT NOTE ADULT - ASSESSMENT
Assessment/Plan:  50y Female on enalapril who presented to [facility] with complaints of tongue swelling and pain and was subsequently intubated for airway protection and transferred to [facility] MICU. Physical exam findings significant for enlarged tongue     - No acute OMFS intervention  - teeth clinically and radiographically without decay  - CT findings highly inconsistent with odontogenic source of infection  - care per MICU and ENT  - re-consult OMFS as needed

## 2021-01-19 NOTE — ED ADULT NURSE REASSESSMENT NOTE - NS ED NURSE REASSESS COMMENT FT1
EMS arrived at 1045am, pt. VSS, pt. calm and not agitated against v-tube, labs drawn by ED RN, X-ray called to bedside for STAT post-intubation xray. ED RN gave report to ICU RN in [facility]. 11:15am- pt. more agitated against tube, eyes open, pulling at lines, ED MD called to bedside, ED MD administered 50mg IVP propofol, pt. calm for few mins, 11:30- pt. hypotensive, IVF started as per ED MD, ED MD ordered IVP versed, ED MD ordered IV piggy back versed infusion, 1145- pt. suctioned due to excessive secretions, pt. ETCO2= 45, O2=98%. 1200- pt. continued to be agitated against tube, awaiting versed drip from pharmacy. 1217-versed drip started, EMS transported pt. to [facility].

## 2021-01-19 NOTE — ED PROVIDER NOTE - NS ED MD EM SELECTION
03306 Exp Problem Focused - Mod. Complex 77828 Comprehensive 39440 Exp Problem Focused - Mod. Complex

## 2021-01-19 NOTE — ED ADULT NURSE REASSESSMENT NOTE - NS ED NURSE REASSESS COMMENT FT1
size 6.5 ET tube placed.  50 of Madhav by anesthesia at 1004 am size 6.5 ET tube placed, 22 at the lip.  50 of Madhav by anesthesia at 1004 am

## 2021-01-19 NOTE — H&P ADULT - NSHPPHYSICALEXAM_GEN_ALL_CORE
ICU Vital Signs Last 24 Hrs  T(C): 36.4 (19 Jan 2021 13:15), Max: 37.3 (19 Jan 2021 04:43)  T(F): 97.5 (19 Jan 2021 13:15), Max: 99.2 (19 Jan 2021 04:43)  HR: 82 (19 Jan 2021 14:01) (82 - 134)  BP: 101/65 (19 Jan 2021 14:00) (89/60 - 151/94)  BP(mean): 77 (19 Jan 2021 14:00) (77 - 98)  ABP: --  ABP(mean): --  RR: 16 (19 Jan 2021 14:00) (16 - 20)  SpO2: 97% (19 Jan 2021 14:01) (94% - 100%)

## 2021-01-19 NOTE — H&P ADULT - NSICDXPASTSURGICALHX_GEN_ALL_CORE_FT
PAST SURGICAL HISTORY:  H/O prior ablation treatment 2011    H/O tubal ligation 1997, bilateral    Status post fine needle biopsy 7/16/18

## 2021-01-19 NOTE — CHART NOTE - NSCHARTNOTEFT_GEN_A_CORE
[facility]  Head & Neck Surgery Procedure Note      Name:                  Selam Pappas	               Surgeon:   Jose Angel Patton MD	  				  Date of Procedure: 01/19/2021                       Assistant:  None    Record Number:	[id number]                              Anesthesia:  Topical Anesthesia       Preoperative diagnosis:	Dysphagia, unspecified (R13.10)  	  Postoperative diagnosis:	Dysphagia, unspecified (R13.10)    Procedure:		Flexible Fiberoptic Laryngoscopy  (40145)    INDICATION:  The patient is a 50 year old female with PMH of HTN (takes enalapril), DM, breast cancer diagnosed 2019, (on tamoxifen) who initially presented to [facility] ED complaining of swelling and pain to the tongue. Pt denied new meds and no new soaps/detergents/contact with anything. Reported the swelling started Tuesday and she was sent home from work. Over the weekend the symptoms somewhat improved. However overnight from monday to tuesday, she noticed tongue swelling with trouble talking. She had no temperature at home. She denies shortness of breath, fevers, chills. Last year after she was started on tamoxifen in 2019, she had an episode of facial swelling which resolved.  She denied new foods or environmental exposures that she knows of.  Pt was intubated at OSH and transferred to [facility] MICU for care and evaluation ENT was consulted for evaluation.    PROCEDURE: The patient was seen and her bilateral nasal cavities were prepped and sprayed with topical anesthesia of neosynephrine.   Following this, a fiberoptic flexible laryngoscope was passed into the left nasal cavity, and then slowly and meticulously moved posteriorly to the nasopharynx.  There was no evidence of clotted blood within the left anterior and posterior superior lateral nasal wall. There was clear mucous within the nasal cavity.  Once at nasopharynx the skull base and lateral walls of the eustachian tubes were examined for lesions and masses.  There was no blood or masses within the nasopharynx. There was mild prominence of the nasopharyngeal soft tissue consistent with inflammatory reaction.  The fiberoptic endoscope was then carefully directed inferiorly and moved to the hypopharynx and glottis.  There was no fullness of the base of tongue.  There was no evidence of retropharyngeal erythema or edema.  There was mild  diffuse erythema  of the pharyngeal wall and supraglottic structure consistent with mild angioedema. The ETT passes through the glottis and true vocal cords.  There was no evidence of foreign body or pooling of secretions, or obvious aspiration or penetration of liquid into the glottis.  The airway was widely patent. The patient tolerated the procedure well.

## 2021-01-19 NOTE — ED ADULT NURSE REASSESSMENT NOTE - NS ED NURSE REASSESS COMMENT FT1
patient cough. increased shortness of breath, moved to the trauma room for potential intubation.  Dentures removed.  Anesthesia in the trauma room.

## 2021-01-19 NOTE — H&P ADULT - NSICDXFAMILYHX_GEN_ALL_CORE_FT
FAMILY HISTORY:  Father  Still living? No  Family history of stroke, Age at diagnosis: Age Unknown    Mother  Still living? No  Family history of diabetes mellitus, Age at diagnosis: Age Unknown  Family history of hypertension, Age at diagnosis: Age Unknown  Family history of kidney disease, Age at diagnosis: Age Unknown

## 2021-01-19 NOTE — H&P ADULT - NSHPLABSRESULTS_GEN_ALL_CORE
.  LABS:                         14.4   16.86 )-----------( 356      ( 19 Jan 2021 11:11 )             44.4     01-19    139  |  109<H>  |  14  ----------------------------<  285<H>  3.3<L>   |  20<L>  |  0.84    Ca    8.4<L>      19 Jan 2021 11:11    TPro  7.0  /  Alb  3.2<L>  /  TBili  0.3  /  DBili  x   /  AST  15  /  ALT  14  /  AlkPhos  70  01-19              RADIOLOGY, EKG & ADDITIONAL TESTS: Reviewed.   < from: Xray Chest 1 View- PORTABLE-Urgent (Xray Chest 1 View- PORTABLE-Urgent .) (01.19.21 @ 11:26) >      EXAM:  XR CHEST PORTABLE URGENT 1V                            PROCEDURE DATE:  01/19/2021          INTERPRETATION:  AP chest on January 19, 2021 at 11:10 AM. Patient was intubated.    Elevated right hemidiaphragm again noted. Heart magnified by technique.    There is a small infiltrate developing at left base new since January 29, 2012.    In addition there is wall infiltrate at the left base also new.    Present film shows intubation.    IMPRESSION: Basilar atelectatic infiltrates right greater than left. Intubation.    < end of copied text >    < from: CT Neck Soft Tissue w/ IV Cont (01.19.21 @ 09:06) >    FINDINGS:    The tongue appears enlarged without evidence of focal mass. There is asymmetric enlargement of the left palatine tonsil with surrounding fat stranding suggesting tonsillitis. There is edema along the left oropharyngeal wall and in the floor of mouth and left submandibular space, suggesting cellulitis. No evidence of rim-enhancing fluid collection.    No enlarged cervical lymph nodes.    The parotid, submandibular and thyroid glands are within normal limits.    The vascular structures demonstrate normal enhancement.    There are no suspicious osteolytic or blastic lesions.    The visualized intracranial and intraorbital compartments fail to demonstrate abnormal enhancement, or mass effect.    The lung apices are within normal limits.      IMPRESSION:  The tongue appears enlarged without evidence of focal mass. There is asymmetric enlargement of the left palatine tonsil with surrounding fat stranding suggesting tonsillitis. There is edema along the left oropharyngeal wall and in the floor of mouth and left submandibular space, suggesting cellulitis. No evidence of rim-enhancing fluid collection.    < end of copied text > LABS:                         14.4   16.86 )-----------( 356      ( 19 Jan 2021 11:11 )             44.4     01-19    139  |  109<H>  |  14  ----------------------------<  285<H>  3.3<L>   |  20<L>  |  0.84    Ca    8.4<L>      19 Jan 2021 11:11    TPro  7.0  /  Alb  3.2<L>  /  TBili  0.3  /  DBili  x   /  AST  15  /  ALT  14  /  AlkPhos  70  01-19              RADIOLOGY, EKG & ADDITIONAL TESTS: Reviewed.   < from: Xray Chest 1 View- PORTABLE-Urgent (Xray Chest 1 View- PORTABLE-Urgent .) (01.19.21 @ 11:26) >      EXAM:  XR CHEST PORTABLE URGENT 1V                            PROCEDURE DATE:  01/19/2021          INTERPRETATION:  AP chest on January 19, 2021 at 11:10 AM. Patient was intubated.    Elevated right hemidiaphragm again noted. Heart magnified by technique.    There is a small infiltrate developing at left base new since January 29, 2012.    In addition there is wall infiltrate at the left base also new.    Present film shows intubation.    IMPRESSION: Basilar atelectatic infiltrates right greater than left. Intubation.    < end of copied text >    < from: CT Neck Soft Tissue w/ IV Cont (01.19.21 @ 09:06) >    FINDINGS:    The tongue appears enlarged without evidence of focal mass. There is asymmetric enlargement of the left palatine tonsil with surrounding fat stranding suggesting tonsillitis. There is edema along the left oropharyngeal wall and in the floor of mouth and left submandibular space, suggesting cellulitis. No evidence of rim-enhancing fluid collection.    No enlarged cervical lymph nodes.    The parotid, submandibular and thyroid glands are within normal limits.    The vascular structures demonstrate normal enhancement.    There are no suspicious osteolytic or blastic lesions.    The visualized intracranial and intraorbital compartments fail to demonstrate abnormal enhancement, or mass effect.    The lung apices are within normal limits.      IMPRESSION:  The tongue appears enlarged without evidence of focal mass. There is asymmetric enlargement of the left palatine tonsil with surrounding fat stranding suggesting tonsillitis. There is edema along the left oropharyngeal wall and in the floor of mouth and left submandibular space, suggesting cellulitis. No evidence of rim-enhancing fluid collection.    < end of copied text >

## 2021-01-19 NOTE — ED ADULT NURSE NOTE - OBJECTIVE STATEMENT
Pt is a 50 year old female with PMH of HTN, DM who comes to the ED complaining of swelling to the tongue. Pt states no new meds and no new soaps/detergents/contact with anything. Mentions yesterday she had swelling of her lips and they got better. Today at 1 am noticed tongue swelling with trouble talking. No dyspnea no chest pain. Pt is on Enalapril for HTN and has been for years

## 2021-01-19 NOTE — CONSULT NOTE ADULT - SUBJECTIVE AND OBJECTIVE BOX
· Subjective and Objective:     HPI: 50 year old female with PMH of HTN (takes enalapril), DM, breast cancer diagnosed 2019, (on tamoxifen) who initially presented to [place] ED complaining of swelling and pain to the tongue. Pt denied new meds and no new soaps/detergents/contact with anything. Reported the swelling started Tuesday and she was sent home from work. Over the weekend the symptoms somewhat improved. However overnight from monday to tuesday, she noticed tongue swelling with trouble talking. She had no temperature at home. She denies shortness of breath, fevers, chills. Last year after she was started on tamoxifen in 2019, she had an episode of facial swelling which resolved.  She denied new foods or environmental exposures that she knows of.  Pt was intubated at OSH and transferred to [facility] MICU for care and evaluation ENT was consulted for evaluation.    Allergies    No Known Allergies    PAST MEDICAL & SURGICAL HISTORY:  Malignant neoplasm of right female breast, unspecified estrogen receptor status, unspecified site of breast  on tamoxifen    Uterine leiomyoma, unspecified location  2011    Bladder prolapse, female, acquired    Essential hypertension    Type 2 diabetes mellitus without complication, without long-term current use of insulin    Status post fine needle biopsy  7/16/18    H/O prior ablation treatment  2011    H/O tubal ligation  1997, bilateral      MEDICATIONS:  Antiinfectives:   piperacillin/tazobactam IVPB.. 3.375 Gram(s) IV Intermittent every 8 hours      Hematologic/Anticoagulation:      Pain medications/Neuro:  midazolam Infusion 0.02 mG/kG/Hr IV Continuous <Continuous>  propofol Infusion 10 MICROgram(s)/kG/Min IV Continuous <Continuous>      IV fluids:  dextrose 5%. 1000 milliLiter(s) IV Continuous <Continuous>  dextrose 5%. 1000 milliLiter(s) IV Continuous <Continuous>      Endocrine Medications:   dextrose 40% Gel 15 Gram(s) Oral once  dextrose 50% Injectable 25 Gram(s) IV Push once  dextrose 50% Injectable 12.5 Gram(s) IV Push once  dextrose 50% Injectable 25 Gram(s) IV Push once  glucagon  Injectable 1 milliGRAM(s) IntraMuscular once  insulin lispro (ADMELOG) corrective regimen sliding scale   SubCutaneous every 6 hours      All other standing medications:   chlorhexidine 0.12% Liquid 15 milliLiter(s) Oral Mucosa every 12 hours  chlorhexidine 4% Liquid 1 Application(s) Topical <User Schedule>  tamoxifen 20 milliGRAM(s) Oral daily      All other PRN medications:      LABS:  CBC-    01-19    139  |  109<H>  |  14  ----------------------------<  285<H>  3.3<L>   |  20<L>  |  0.84    Ca    8.4<L>      19 Jan 2021 11:11    TPro  7.0  /  Alb  3.2<L>  /  TBili  0.3  /  DBili  x   /  AST  15  /  ALT  14  /  AlkPhos  70  01-19    Coagulation Studies-    Endocrine Panel-  --  --  8.4 mg/dL      CT Neck (01/19/2021):   The tongue appears enlarged without evidence of focal mass. There is asymmetric enlargement of the left palatine tonsil with surrounding fat stranding suggesting tonsillitis. There is edema along the left oropharyngeal wall and in the floor of mouth and left submandibular space, suggesting cellulitis. No evidence of rim-enhancing fluid collection.      PHYSICAL EXAM:  Vital Signs Last 24 Hrs  T(C): 36.4 (19 Jan 2021 13:15), Max: 37.3 (19 Jan 2021 04:43)  T(F): 97.5 (19 Jan 2021 13:15), Max: 99.2 (19 Jan 2021 04:43)  HR: 77 (19 Jan 2021 15:00) (77 - 134)  BP: 106/66 (19 Jan 2021 15:00) (89/60 - 151/94)  BP(mean): 78 (19 Jan 2021 15:00) (77 - 98)  RR: 16 (19 Jan 2021 15:00) (16 - 20)  SpO2: 97% (19 Jan 2021 15:00) (94% - 100%)  Constitutional: Sedated and intubated, on ventilator     Head:  normocephalic, atraumatic.   Nose:  Septum intact, midline, deviated.  Inferior turbinates normal bilateral  OC/OP:  ETT in place. Tongue appears large and swollen. Firm to the touch. Floor of mouth with edema as well but soft when palpated. Cannot see to the back of the oropharynx due to tongue enragement Tongue does fit into the mouth   Neck:  Submental area full but soft to palpation. Does not feel firm. Trachea midline.  Thyroid, parotid and submandibular glands normal.  Lymph:  No cervical adenopathy palpated.    Flexible Fiberoptic Laryngoscopy: clear nasopharynx to glottis, +ETT passing through glottis, mild supraglottic edema, no pooling of secretions      Assessment/Plan:  50y Female with past medical history significant for HTN on ACEI admitted for andioedema. Edema limited to the oropharynx.    -f/u OMFS evaluation  -decadron 10mg IV q8hrs x 48 hrs   -PPI  -H2 blockers  -care as per ICU   · Subjective and Objective:     HPI: 50 year old female with PMH of HTN (takes enalapril), DM, breast cancer diagnosed 2019, (on tamoxifen) who initially presented to [place] ED complaining of swelling and pain to the tongue. Pt denied new meds and no new soaps/detergents/contact with anything. Reported the swelling started Tuesday and she was sent home from work. Over the weekend the symptoms somewhat improved. However overnight from monday to tuesday, she noticed tongue swelling with trouble talking. She had no temperature at home. She denies shortness of breath, fevers, chills. Last year after she was started on tamoxifen in 2019, she had an episode of facial swelling which resolved.  She denied new foods or environmental exposures that she knows of.  Pt was intubated at OSH and transferred to [facility] MICU for care and evaluation ENT was consulted for evaluation.    Allergies    No Known Allergies    PAST MEDICAL & SURGICAL HISTORY:  Malignant neoplasm of right female breast, unspecified estrogen receptor status, unspecified site of breast  on tamoxifen    Uterine leiomyoma, unspecified location  2011    Bladder prolapse, female, acquired    Essential hypertension    Type 2 diabetes mellitus without complication, without long-term current use of insulin    Status post fine needle biopsy  7/16/18    H/O prior ablation treatment  2011    H/O tubal ligation  1997, bilateral      MEDICATIONS:  Antiinfectives:   piperacillin/tazobactam IVPB.. 3.375 Gram(s) IV Intermittent every 8 hours      Hematologic/Anticoagulation:      Pain medications/Neuro:  midazolam Infusion 0.02 mG/kG/Hr IV Continuous <Continuous>  propofol Infusion 10 MICROgram(s)/kG/Min IV Continuous <Continuous>      IV fluids:  dextrose 5%. 1000 milliLiter(s) IV Continuous <Continuous>  dextrose 5%. 1000 milliLiter(s) IV Continuous <Continuous>      Endocrine Medications:   dextrose 40% Gel 15 Gram(s) Oral once  dextrose 50% Injectable 25 Gram(s) IV Push once  dextrose 50% Injectable 12.5 Gram(s) IV Push once  dextrose 50% Injectable 25 Gram(s) IV Push once  glucagon  Injectable 1 milliGRAM(s) IntraMuscular once  insulin lispro (ADMELOG) corrective regimen sliding scale   SubCutaneous every 6 hours      All other standing medications:   chlorhexidine 0.12% Liquid 15 milliLiter(s) Oral Mucosa every 12 hours  chlorhexidine 4% Liquid 1 Application(s) Topical <User Schedule>  tamoxifen 20 milliGRAM(s) Oral daily      All other PRN medications:      LABS:  CBC-    01-19    139  |  109<H>  |  14  ----------------------------<  285<H>  3.3<L>   |  20<L>  |  0.84    Ca    8.4<L>      19 Jan 2021 11:11    TPro  7.0  /  Alb  3.2<L>  /  TBili  0.3  /  DBili  x   /  AST  15  /  ALT  14  /  AlkPhos  70  01-19    Coagulation Studies-    Endocrine Panel-  --  --  8.4 mg/dL      CT Neck (01/19/2021):   The tongue appears enlarged without evidence of focal mass. There is asymmetric enlargement of the left palatine tonsil with surrounding fat stranding suggesting tonsillitis. There is edema along the left oropharyngeal wall and in the floor of mouth and left submandibular space, suggesting cellulitis. No evidence of rim-enhancing fluid collection.      PHYSICAL EXAM:  Vital Signs Last 24 Hrs  T(C): 36.4 (19 Jan 2021 13:15), Max: 37.3 (19 Jan 2021 04:43)  T(F): 97.5 (19 Jan 2021 13:15), Max: 99.2 (19 Jan 2021 04:43)  HR: 77 (19 Jan 2021 15:00) (77 - 134)  BP: 106/66 (19 Jan 2021 15:00) (89/60 - 151/94)  BP(mean): 78 (19 Jan 2021 15:00) (77 - 98)  RR: 16 (19 Jan 2021 15:00) (16 - 20)  SpO2: 97% (19 Jan 2021 15:00) (94% - 100%)  Constitutional: Sedated and intubated, on ventilator     Head:  normocephalic, atraumatic.   Nose:  Septum intact, midline, deviated.  Inferior turbinates normal bilateral  OC/OP:  ETT in place. Tongue appears large and swollen. Firm to the touch. Floor of mouth with edema as well but soft when palpated. Cannot see to the back of the oropharynx due to tongue enragement Tongue does fit into the mouth   Neck:  Submental area full but soft to palpation. Does not feel firm. Trachea midline.  Thyroid, parotid and submandibular glands normal.  Lymph:  No cervical adenopathy palpated.    Flexible Fiberoptic Laryngoscopy: clear nasopharynx to glottis, +ETT passing through glottis, mild supraglottic edema, no pooling of secretions      Assessment/Plan:  50y Female with past medical history significant for HTN on ACEI admitted for andioedema. Edema limited to the oropharynx.    -f/u OMFS evaluation  -decadron 10mg IV q8hrs x 24 hours then ok to wean to extubate from ENT standpoint   -PPI  -H2 blockers  -care as per ICU

## 2021-01-19 NOTE — AIRWAY PLACEMENT NOTE ADULT - AIRWAY COMMENTS:
called by ed physician, nichelle's angina vs anaphylaxis. saturating 100% on non rebreather. asked to intubate for airway protection. atraumatic intubation.

## 2021-01-19 NOTE — ED ADULT NURSE REASSESSMENT NOTE - NS ED NURSE REASSESS COMMENT FT1
Patient states she did not have improvement with the medications administered.  Dr Alegre at bedside for eval.  Patient not in respiratory distress.

## 2021-01-19 NOTE — ED PROVIDER NOTE - OBJECTIVE STATEMENT
Pt is a 50 year old female with PMH of HTN, DM who comes to the ED complaining of swelling to the tongue. Pt states no new meds and no new soaps/detergents/contact with anything. Mentions yesterday she had swelling of her lips and they got better. Today at 1 am noticed tongue swelling. Pt is a 50 year old female with PMH of HTN, DM who comes to the ED complaining of swelling to the tongue. Pt states no new meds and no new soaps/detergents/contact with anything. Mentions yesterday she had swelling of her lips and they got better. Today at 1 am noticed tongue swelling with trouble talking. No dyspnea no chest pain. Pt is on Enalapril for HTN and has been for years. Pt is a 50 year old female with PMH of HTN, DM who comes to the ED complaining of swelling to the tongue. Pt states no new meds and no new soaps/detergents/contact with anything. Mentions yesterday she had swelling of her lips and they got better. Today at 1 am noticed tongue swelling with trouble talking. No dyspnea, no fevers, no chest pain, denies any other complaints or pains. Isolated swelling of anterior tongue.  Pt is on Enalapril for HTN and states has been on it  for years.

## 2021-01-19 NOTE — H&P ADULT - HISTORY OF PRESENT ILLNESS
Information below is obtained solely through chart review and discussion on phone with patient's mother.    50 year old female with PMH of HTN, DM who initially came to [place] ED complaining of swelling and pain to the tongue. Pt states no new meds and no new soaps/detergents/contact with anything. Mentions the swelling started Tuesday and she was sent home from work. Over the weekend the symptoms somewhat improved. However overnight from monday to tuesday, she noticed tongue swelling with trouble talking. She denies any other symptoms. She had no temperature at home, did not feel generally ill or weak. He only complaints are those localized to the pain and swelling of mouth/throat. She denies shortness of breath, fevers, chills. Of note patient is on Enalapril for HTN and has been for years. She denies no new foods or environmental exposures that she knows of. Denies hx of drug or environmental allergies. She has worked as a  for 13 years.  Information below is obtained solely through chart review and discussion on phone with patient's mother.    50 year old female with PMH of HTN, DM, breast cancer diagnosed 2019, (on tamoxifen) who initially came to [place] ED complaining of swelling and pain to the tongue. Pt states no new meds and no new soaps/detergents/contact with anything. Mentions the swelling started Tuesday and she was sent home from work. Over the weekend the symptoms somewhat improved. However overnight from monday to tuesday, she noticed tongue swelling with trouble talking. She denies any other symptoms. She had no temperature at home, did not feel generally ill or weak. He only complaints are those localized to the pain and swelling of mouth/throat. She denies shortness of breath, fevers, chills. Of note patient is on Enalapril for HTN and has been for years. ADDITIONALLY, last year she was notes after she was started on tamoxifen in 2019, she had an episode of facial swelling which resolved.  She denies no new foods or environmental exposures that she knows of. Denies hx of drug or environmental allergies. She has worked as a  for 13 years.

## 2021-01-19 NOTE — CONSULT NOTE ADULT - SUBJECTIVE AND OBJECTIVE BOX
HPI: 50 year old female with PMH of HTN (takes enalapril), DM, breast cancer diagnosed 2019, (on tamoxifen) who initially presented to [place] ED complaining of swelling and pain to the tongue. Pt denied new meds and no new soaps/detergents/contact with anything. Reported the swelling started Tuesday and she was sent home from work. Over the weekend the symptoms somewhat improved. However overnight from monday to tuesday, she noticed tongue swelling with trouble talking. She had no temperature at home. She denies shortness of breath, fevers, chills. Last year after she was started on tamoxifen in 2019, she had an episode of facial swelling which resolved.  She denied new foods or environmental exposures that she knows of.    CT Neck showed:     The tongue appears enlarged without evidence of focal mass. There is asymmetric enlargement of the left palatine tonsil with surrounding fat stranding suggesting tonsillitis. There is edema along the left oropharyngeal wall and in the floor of mouth and left submandibular space, suggesting cellulitis. No evidence of rim-enhancing fluid collection.    Pt was intubated at OSH and transferred to [facility] MICU for care and evaluation ENT was consulted for evaluation.    Allergies    No Known Allergies    PAST MEDICAL & SURGICAL HISTORY:  Malignant neoplasm of right female breast, unspecified estrogen receptor status, unspecified site of breast  on tamoxifen    Uterine leiomyoma, unspecified location  2011    Bladder prolapse, female, acquired    Essential hypertension    Type 2 diabetes mellitus without complication, without long-term current use of insulin    Status post fine needle biopsy  7/16/18    H/O prior ablation treatment  2011    H/O tubal ligation  1997, bilateral      MEDICATIONS:  Antiinfectives:   piperacillin/tazobactam IVPB.. 3.375 Gram(s) IV Intermittent every 8 hours      Hematologic/Anticoagulation:      Pain medications/Neuro:  midazolam Infusion 0.02 mG/kG/Hr IV Continuous <Continuous>  propofol Infusion 10 MICROgram(s)/kG/Min IV Continuous <Continuous>      IV fluids:  dextrose 5%. 1000 milliLiter(s) IV Continuous <Continuous>  dextrose 5%. 1000 milliLiter(s) IV Continuous <Continuous>      Endocrine Medications:   dextrose 40% Gel 15 Gram(s) Oral once  dextrose 50% Injectable 25 Gram(s) IV Push once  dextrose 50% Injectable 12.5 Gram(s) IV Push once  dextrose 50% Injectable 25 Gram(s) IV Push once  glucagon  Injectable 1 milliGRAM(s) IntraMuscular once  insulin lispro (ADMELOG) corrective regimen sliding scale   SubCutaneous every 6 hours      All other standing medications:   chlorhexidine 0.12% Liquid 15 milliLiter(s) Oral Mucosa every 12 hours  chlorhexidine 4% Liquid 1 Application(s) Topical <User Schedule>  tamoxifen 20 milliGRAM(s) Oral daily      All other PRN medications:      Vital Signs Last 24 Hrs  T(C): 36.4 (19 Jan 2021 13:15), Max: 37.3 (19 Jan 2021 04:43)  T(F): 97.5 (19 Jan 2021 13:15), Max: 99.2 (19 Jan 2021 04:43)  HR: 77 (19 Jan 2021 15:00) (77 - 134)  BP: 106/66 (19 Jan 2021 15:00) (89/60 - 151/94)  BP(mean): 78 (19 Jan 2021 15:00) (77 - 98)  RR: 16 (19 Jan 2021 15:00) (16 - 20)  SpO2: 97% (19 Jan 2021 15:00) (94% - 100%)    LABS:  CBC-    01-19    139  |  109<H>  |  14  ----------------------------<  285<H>  3.3<L>   |  20<L>  |  0.84    Ca    8.4<L>      19 Jan 2021 11:11    TPro  7.0  /  Alb  3.2<L>  /  TBili  0.3  /  DBili  x   /  AST  15  /  ALT  14  /  AlkPhos  70  01-19    Coagulation Studies-    Endocrine Panel-  --  --  8.4 mg/dL        PHYSICAL EXAM:    ENT EXAM-   Constitutional: Sedated and intubated, on ventilator     Head:  normocephalic, atraumatic.   Nose:  Septum intact, midline, deviated.  Inferior turbinates normal bilateral  OC/OP:  ETT in place. Tongue appears large and swollen. Firm to the touch. Floor of mouth with edema as well but soft when palpated. Cannot see to the back of the oropharynx due to tongue enragement Tongue does fit into the mouth   Neck:  Submental area full but soft to palpation. Does not feel firm. Trachea midline.  Thyroid, parotid and submandibular glands normal.  Lymph:  No cervical adenopathy palpated.      Assessment/Plan:  50y Female on enalapril who presented to outside hospital with complaints of tongue swelling and pain and was subsequently intubated for airway protection and transferred to [facility] MICU. Pgyscial exam findings significant for enlarged tongue and watery but compressible floor of mouth. CT findings showed diffuse edema with no obvious collection. Findings most consistent with angioedema     - No acute ENT intervention  - Recommend angioedema cocktail (Benadryl 50mg IV q8h, Famotidine 20mg IV q12h, Dexamethasone 10mg q6h)  - Please update chart to reflect possible allergy to enalapril   - Recommend OMFS consult to help rule out Jose Rafael's as well   - Rest of care per MICU  - Page or call with questions or concerns

## 2021-01-19 NOTE — ED ADULT TRIAGE NOTE - CHIEF COMPLAINT QUOTE
Patient complaining of sudden onset of tongue swelling starting at 1am.  Feels like it is harder for her to breathe and for her to swallow her spit.  Patient speaking but tongue is visibly swollen.

## 2021-01-19 NOTE — ED PROVIDER NOTE - PROGRESS NOTE DETAILS
Reassessed is sitting up in ED stretcher reading on her phone. Tongue with mild improvement. Still swollen. Janet HDZ: pending reassessment for dispo. S/o to day team. patient signed out to me at 715 am, went to evaluate pt, pt having trouble speaking, nek is swollen, floor of mouth swollen and lifted up, tongue swollen, pt diabetic with elevated wbc count, concern for ludwigs angina, on phone with transfer center to t/f for ENT eval, zosyn ordered, discussed with patient,  used. pt agreeable to transfer dr nicholas accepted pt to [facility] ed in 45 mins if airway still stable after decadron, t/f center setting up transfer bypasasl abs for ct Anders NGUYEN for ED attending, Dr. Cunningham: Pt states that she feels like her throat with closing, pt was rushed to the trauma room, STAT epinephrine given, anesthesia called for intubation, currently at bedside. Anders NGUYEN for ED attending, Dr. Cunningham: Getting ambulance now for transfer, patient is being sedated at this time.

## 2021-01-19 NOTE — H&P ADULT - ATTENDING COMMENTS
Patient seen and examined.    50 year old woman transferred from Health system s/p intubation for critical airway for ENT evaluation concern for Jose Rafael's angina    - check CoViD swab  - monitor ABG and wean vent as tolerated  - ENT consult  - continue ABx and follow up cultures    Critically ill patient requiring frequent bedside visits.

## 2021-01-19 NOTE — H&P ADULT - ASSESSMENT
50 year old female with PMH of HTN, DM who initially came to [place] ED complaining of swelling and pain to the tongue concerning for infection vs. nichelle angina vs. unspecified edema. 50 year old female with PMH of HTN, DM who initially came to [place] ED complaining of swelling and pain to the tongue concerning for infection vs. nichelle angina vs. unspecified edema.    Neuro:   - Intubated adequately on versed and propofol drips    CV:   - History of HTN, currently BP stable.   - Hemodynamically stable at this time, MAP>65 off pressors    Respiratory:   #Upper airway obstruction 2/2 nichelle angina vs. angioedema vs. allergy  - Received some steroids at OSH ED, started on zosyn  - F/U OMFS, ENT recommendations  - Zosyn for infection    Endo:   DM- Diabetic on metformin at home.   - ISS q6  - Check A1c in AM.  - NPO for now if surgical option needed, F/U ENT, OMFS recs. NG tube feeds after OG placed  - Zosyn for infection    Heme/ONC  Breast Cancer on tamoxifen.  - Hold tamoxifen in setting of possible infection?  - Will contact family to see who oncologist is.     GI  NPO for now in case of surgical need.    ID:   Swelling of roof of mouth, likely 2/2 ludwigs angina vs. angioedema vs. allergy  - F/U OMFS, ENT recommendations    Renal:   Normal Kidney Function, trend I+Os    Cristobal Villafana  PGY-2 50 year old female with PMH of HTN, DM who initially came to [place] ED complaining of swelling and pain to the tongue concerning for infection vs. nichelle angina vs. unspecified edema.    Neuro:   - Intubated adequately on versed and propofol drips    CV:   - History of HTN, currently BP stable.   - Hemodynamically stable at this time, MAP>65 off pressors    Respiratory:   #Upper airway obstruction 2/2 nichelle angina vs. angioedema vs. allergy  - Received some steroids at OSH ED, started on zosyn  - F/U OMFS, ENT recommendations  - Zosyn for infection    Endo:   DM- Diabetic on metformin at home.   - ISS q6  - Check A1c in AM.  - NPO for now if surgical option needed, F/U ENT, OMFS recs. NG tube feeds after OG placed  - Zosyn for infection    Heme/ONC  Breast Cancer on tamoxifen since 2019  - Hold tamoxifen in setting of possible infection?, per records looks like Dr. Li is oncologist. Will contact    GI  NPO for now in case of surgical need.    ID:   Swelling of roof of mouth, likely 2/2 ludwigs angina vs. angioedema vs. allergy  - F/U OMFS, ENT recommendations    Renal:   Normal Kidney Function, trend I+Os    Cristobal Villafana  PGY-2 50 year old female with PMH of HTN, DM who initially came to [place] ED complaining of swelling and pain to the tongue concerning for infection vs. nichelle angina vs. unspecified edema.    Neuro:   - Intubated adequately on versed and propofol drips    CV:   - History of HTN, currently BP stable.   - Hemodynamically stable at this time, MAP>65 off pressors    Respiratory:   #Upper airway obstruction 2/2 nichelle angina vs. angioedema vs. allergy  - Received some steroids at OSH ED, started on zosyn  - F/U OMFS, ENT recommendations  - Zosyn for infection    Endo:   DM- Diabetic on metformin at home.   - ISS q6  - Check A1c in AM.  - NPO for now if surgical option needed, F/U ENT, OMFS recs. NG tube feeds after OG placed  - Zosyn for infection    Heme/ONC  Breast Cancer on tamoxifen since 2019  - Continue tamoxifen through OG tube    GI  NPO for now in case of surgical need.    ID:   Swelling of roof of mouth, likely 2/2 ludwigs angina vs. angioedema vs. allergy  - F/U OMFS, ENT recommendations    Renal:   Normal Kidney Function, trend I+Os    Cristobal Villafana  PGY-2

## 2021-01-19 NOTE — ED PROVIDER NOTE - NS_ ATTENDINGSCRIBEDETAILS _ED_A_ED_FT
I, Elvis Gonzales MD,  performed the initial face to face bedside interview with this patient regarding history of present illness, review of symptoms and relevant past medical, social and family history.  I completed an independent physical examination.    The history, relevant review of systems, past medical and surgical history, medical decision making, and physical examination was documented by the scribe in my presence and I attest to the accuracy of the documentation.

## 2021-01-19 NOTE — H&P ADULT - NSICDXPASTMEDICALHX_GEN_ALL_CORE_FT
PAST MEDICAL HISTORY:  Bladder prolapse, female, acquired     Essential hypertension     Malignant neoplasm of right female breast, unspecified estrogen receptor status, unspecified site of breast on tamoxifen    Type 2 diabetes mellitus without complication, without long-term current use of insulin     Uterine leiomyoma, unspecified location 2011

## 2021-01-20 LAB
ALBUMIN SERPL ELPH-MCNC: 3.3 G/DL — SIGNIFICANT CHANGE UP (ref 3.3–5)
ALP SERPL-CCNC: 60 U/L — SIGNIFICANT CHANGE UP (ref 40–120)
ALT FLD-CCNC: 7 U/L — SIGNIFICANT CHANGE UP (ref 4–33)
ANION GAP SERPL CALC-SCNC: 16 MMOL/L — HIGH (ref 7–14)
AST SERPL-CCNC: 13 U/L — SIGNIFICANT CHANGE UP (ref 4–32)
BASOPHILS # BLD AUTO: 0.06 K/UL — SIGNIFICANT CHANGE UP (ref 0–0.2)
BASOPHILS NFR BLD AUTO: 0.3 % — SIGNIFICANT CHANGE UP (ref 0–2)
BILIRUB SERPL-MCNC: <0.2 MG/DL — SIGNIFICANT CHANGE UP (ref 0.2–1.2)
BUN SERPL-MCNC: 15 MG/DL — SIGNIFICANT CHANGE UP (ref 7–23)
CALCIUM SERPL-MCNC: 8.6 MG/DL — SIGNIFICANT CHANGE UP (ref 8.4–10.5)
CHLORIDE SERPL-SCNC: 107 MMOL/L — SIGNIFICANT CHANGE UP (ref 98–107)
CO2 SERPL-SCNC: 18 MMOL/L — LOW (ref 22–31)
CREAT SERPL-MCNC: 0.84 MG/DL — SIGNIFICANT CHANGE UP (ref 0.5–1.3)
EOSINOPHIL # BLD AUTO: 0 K/UL — SIGNIFICANT CHANGE UP (ref 0–0.5)
EOSINOPHIL NFR BLD AUTO: 0 % — SIGNIFICANT CHANGE UP (ref 0–6)
GLUCOSE BLDC GLUCOMTR-MCNC: 190 MG/DL — HIGH (ref 70–99)
GLUCOSE BLDC GLUCOMTR-MCNC: 206 MG/DL — HIGH (ref 70–99)
GLUCOSE BLDC GLUCOMTR-MCNC: 206 MG/DL — HIGH (ref 70–99)
GLUCOSE SERPL-MCNC: 189 MG/DL — HIGH (ref 70–99)
HCT VFR BLD CALC: 42.2 % — SIGNIFICANT CHANGE UP (ref 34.5–45)
HGB BLD-MCNC: 13.6 G/DL — SIGNIFICANT CHANGE UP (ref 11.5–15.5)
IANC: 18.23 K/UL — HIGH (ref 1.5–8.5)
IMM GRANULOCYTES NFR BLD AUTO: 3.5 % — HIGH (ref 0–1.5)
LYMPHOCYTES # BLD AUTO: 10.1 % — LOW (ref 13–44)
LYMPHOCYTES # BLD AUTO: 2.26 K/UL — SIGNIFICANT CHANGE UP (ref 1–3.3)
MAGNESIUM SERPL-MCNC: 2.1 MG/DL — SIGNIFICANT CHANGE UP (ref 1.6–2.6)
MCHC RBC-ENTMCNC: 29.8 PG — SIGNIFICANT CHANGE UP (ref 27–34)
MCHC RBC-ENTMCNC: 32.2 GM/DL — SIGNIFICANT CHANGE UP (ref 32–36)
MCV RBC AUTO: 92.3 FL — SIGNIFICANT CHANGE UP (ref 80–100)
MONOCYTES # BLD AUTO: 0.97 K/UL — HIGH (ref 0–0.9)
MONOCYTES NFR BLD AUTO: 4.4 % — SIGNIFICANT CHANGE UP (ref 2–14)
NEUTROPHILS # BLD AUTO: 18.23 K/UL — HIGH (ref 1.8–7.4)
NEUTROPHILS NFR BLD AUTO: 81.7 % — HIGH (ref 43–77)
NRBC # BLD: 0 /100 WBCS — SIGNIFICANT CHANGE UP
NRBC # FLD: 0 K/UL — SIGNIFICANT CHANGE UP
PHOSPHATE SERPL-MCNC: 2.8 MG/DL — SIGNIFICANT CHANGE UP (ref 2.5–4.5)
PLATELET # BLD AUTO: 266 K/UL — SIGNIFICANT CHANGE UP (ref 150–400)
POTASSIUM SERPL-MCNC: 4.1 MMOL/L — SIGNIFICANT CHANGE UP (ref 3.5–5.3)
POTASSIUM SERPL-SCNC: 4.1 MMOL/L — SIGNIFICANT CHANGE UP (ref 3.5–5.3)
PROT SERPL-MCNC: 6.4 G/DL — SIGNIFICANT CHANGE UP (ref 6–8.3)
RBC # BLD: 4.57 M/UL — SIGNIFICANT CHANGE UP (ref 3.8–5.2)
RBC # FLD: 13.2 % — SIGNIFICANT CHANGE UP (ref 10.3–14.5)
SODIUM SERPL-SCNC: 141 MMOL/L — SIGNIFICANT CHANGE UP (ref 135–145)
WBC # BLD: 22.29 K/UL — HIGH (ref 3.8–10.5)
WBC # FLD AUTO: 22.29 K/UL — HIGH (ref 3.8–10.5)

## 2021-01-20 PROCEDURE — 99291 CRITICAL CARE FIRST HOUR: CPT

## 2021-01-20 RX ORDER — DEXAMETHASONE 0.5 MG/5ML
4 ELIXIR ORAL EVERY 6 HOURS
Refills: 0 | Status: DISCONTINUED | OUTPATIENT
Start: 2021-01-20 | End: 2021-01-21

## 2021-01-20 RX ORDER — MIDAZOLAM HYDROCHLORIDE 1 MG/ML
4 INJECTION, SOLUTION INTRAMUSCULAR; INTRAVENOUS ONCE
Refills: 0 | Status: DISCONTINUED | OUTPATIENT
Start: 2021-01-20 | End: 2021-01-20

## 2021-01-20 RX ORDER — AMPICILLIN SODIUM AND SULBACTAM SODIUM 250; 125 MG/ML; MG/ML
3 INJECTION, POWDER, FOR SUSPENSION INTRAMUSCULAR; INTRAVENOUS EVERY 6 HOURS
Refills: 0 | Status: DISCONTINUED | OUTPATIENT
Start: 2021-01-20 | End: 2021-01-23

## 2021-01-20 RX ADMIN — FAMOTIDINE 20 MILLIGRAM(S): 10 INJECTION INTRAVENOUS at 16:38

## 2021-01-20 RX ADMIN — PIPERACILLIN AND TAZOBACTAM 25 GRAM(S): 4; .5 INJECTION, POWDER, LYOPHILIZED, FOR SOLUTION INTRAVENOUS at 06:49

## 2021-01-20 RX ADMIN — Medication 4 MILLIGRAM(S): at 11:08

## 2021-01-20 RX ADMIN — Medication 1: at 06:35

## 2021-01-20 RX ADMIN — Medication 50 MILLIGRAM(S): at 21:13

## 2021-01-20 RX ADMIN — CHLORHEXIDINE GLUCONATE 15 MILLILITER(S): 213 SOLUTION TOPICAL at 06:35

## 2021-01-20 RX ADMIN — AMPICILLIN SODIUM AND SULBACTAM SODIUM 200 GRAM(S): 250; 125 INJECTION, POWDER, FOR SUSPENSION INTRAMUSCULAR; INTRAVENOUS at 16:38

## 2021-01-20 RX ADMIN — PROPOFOL 6.04 MICROGRAM(S)/KG/MIN: 10 INJECTION, EMULSION INTRAVENOUS at 10:19

## 2021-01-20 RX ADMIN — AMPICILLIN SODIUM AND SULBACTAM SODIUM 200 GRAM(S): 250; 125 INJECTION, POWDER, FOR SUSPENSION INTRAMUSCULAR; INTRAVENOUS at 11:08

## 2021-01-20 RX ADMIN — Medication 50 MILLIGRAM(S): at 13:56

## 2021-01-20 RX ADMIN — ENOXAPARIN SODIUM 40 MILLIGRAM(S): 100 INJECTION SUBCUTANEOUS at 16:38

## 2021-01-20 RX ADMIN — Medication 50 MILLIGRAM(S): at 06:48

## 2021-01-20 RX ADMIN — TAMOXIFEN CITRATE 20 MILLIGRAM(S): 20 TABLET, FILM COATED ORAL at 11:09

## 2021-01-20 RX ADMIN — CHLORHEXIDINE GLUCONATE 1 APPLICATION(S): 213 SOLUTION TOPICAL at 06:36

## 2021-01-20 RX ADMIN — Medication 2: at 16:39

## 2021-01-20 RX ADMIN — ENOXAPARIN SODIUM 40 MILLIGRAM(S): 100 INJECTION SUBCUTANEOUS at 06:48

## 2021-01-20 RX ADMIN — CHLORHEXIDINE GLUCONATE 15 MILLILITER(S): 213 SOLUTION TOPICAL at 16:38

## 2021-01-20 RX ADMIN — MIDAZOLAM HYDROCHLORIDE 4 MILLIGRAM(S): 1 INJECTION, SOLUTION INTRAMUSCULAR; INTRAVENOUS at 20:55

## 2021-01-20 RX ADMIN — FAMOTIDINE 20 MILLIGRAM(S): 10 INJECTION INTRAVENOUS at 10:19

## 2021-01-20 RX ADMIN — Medication 4 MILLIGRAM(S): at 16:38

## 2021-01-20 RX ADMIN — Medication 2: at 11:08

## 2021-01-20 RX ADMIN — Medication 102 MILLIGRAM(S): at 06:35

## 2021-01-20 NOTE — PROGRESS NOTE ADULT - SUBJECTIVE AND OBJECTIVE BOX
Interval Events: No events overnight. Seen by both ENT and OMFS.     REVIEW OF SYSTEMS:  Constitutional: [ ] negative [ ] fevers [ ] chills [ ] weight loss [ ] weight gain  HEENT: [ ] negative [ ] dry eyes [ ] eye irritation [ ] postnasal drip [ ] nasal congestion  CV: [ ] negative  [ ] chest pain [ ] orthopnea [ ] palpitations [ ] murmur  Resp: [ ] negative [ ] cough [ ] shortness of breath [ ] dyspnea [ ] wheezing [ ] sputum [ ] hemoptysis  GI: [ ] negative [ ] nausea [ ] vomiting [ ] diarrhea [ ] constipation [ ] abd pain [ ] dysphagia   : [ ] negative [ ] dysuria [ ] nocturia [ ] hematuria [ ] increased urinary frequency  Musculoskeletal: [ ] negative [ ] back pain [ ] myalgias [ ] arthralgias [ ] fracture  Skin: [ ] negative [ ] rash [ ] itch  Neurological: [ ] negative [ ] headache [ ] dizziness [ ] syncope [ ] weakness [ ] numbness  Psychiatric: [ ] negative [ ] anxiety [ ] depression  Endocrine: [ ] negative [ ] diabetes [ ] thyroid problem  Hematologic/Lymphatic: [ ] negative [ ] anemia [ ] bleeding problem  Allergic/Immunologic: [ ] negative [ ] itchy eyes [ ] nasal discharge [ ] hives [ ] angioedema  [ ] All other systems negative  [ ] Unable to assess ROS because ________    OBJECTIVE:  ICU Vital Signs Last 24 Hrs  T(C): 35.7 (20 Jan 2021 04:00), Max: 36.9 (19 Jan 2021 11:35)  T(F): 96.2 (20 Jan 2021 04:00), Max: 98.5 (19 Jan 2021 11:35)  HR: 65 (20 Jan 2021 07:00) (61 - 134)  BP: 117/72 (20 Jan 2021 07:00) (88/59 - 151/94)  BP(mean): 87 (20 Jan 2021 07:00) (69 - 98)  ABP: --  ABP(mean): --  RR: 16 (20 Jan 2021 07:00) (16 - 19)  SpO2: 98% (20 Jan 2021 07:00) (95% - 100%)    Mode: AC/ CMV (Assist Control/ Continuous Mandatory Ventilation)  RR (machine): 16  TV (machine): 400  FiO2: 50  PEEP: 5  ITime: 0.82  MAP: 10  PIP: 22      CAPILLARY BLOOD GLUCOSE      POCT Blood Glucose.: 190 mg/dL (20 Jan 2021 06:33)      PHYSICAL EXAM:  Constitutional: Sedated and intubated, on ventilator     Head:  normocephalic, atraumatic.   GI: normal bowel sounds  Cardio: S1,S2, normal HR  Lungs: bilateral breath sounds  Nose:  Septum intact, midline, deviated.  Inferior turbinates normal bilateral  OC/OP:  ETT in place. Tongue appears large and swollen. Firm to the touch. Floor of mouth with edema as well but soft when palpated.   Neck:  Submental area full but soft to palpation. Does not feel firm. Trachea midline.  Thyroid, parotid and submandibular glands normal.  Lymph:  No cervical adenopathy palpated.    LINES:    MEDICATIONS  (STANDING):  chlorhexidine 0.12% Liquid 15 milliLiter(s) Oral Mucosa every 12 hours  chlorhexidine 4% Liquid 1 Application(s) Topical <User Schedule>  dexAMETHasone  IVPB 10 milliGRAM(s) IV Intermittent every 6 hours  dextrose 40% Gel 15 Gram(s) Oral once  dextrose 5%. 1000 milliLiter(s) (50 mL/Hr) IV Continuous <Continuous>  dextrose 5%. 1000 milliLiter(s) (100 mL/Hr) IV Continuous <Continuous>  dextrose 50% Injectable 25 Gram(s) IV Push once  dextrose 50% Injectable 12.5 Gram(s) IV Push once  dextrose 50% Injectable 25 Gram(s) IV Push once  diphenhydrAMINE   Injectable 50 milliGRAM(s) IV Push every 8 hours  enoxaparin Injectable 40 milliGRAM(s) SubCutaneous every 12 hours  famotidine Injectable 20 milliGRAM(s) IV Push every 12 hours  glucagon  Injectable 1 milliGRAM(s) IntraMuscular once  insulin lispro (ADMELOG) corrective regimen sliding scale   SubCutaneous every 6 hours  midazolam Infusion 0.02 mG/kG/Hr (2.01 mL/Hr) IV Continuous <Continuous>  piperacillin/tazobactam IVPB.. 3.375 Gram(s) IV Intermittent every 8 hours  propofol Infusion 10 MICROgram(s)/kG/Min (6.04 mL/Hr) IV Continuous <Continuous>  tamoxifen 20 milliGRAM(s) Oral daily    MEDICATIONS  (PRN):      PRN Meds:      LABS:                                   14.4   16.86 )-----------( 356      ( 19 Jan 2021 11:11 )             44.4       Hgb Trend: 14.4<--    01-19    139  |  109<H>  |  14  ----------------------------<  285<H>  3.3<L>   |  20<L>  |  0.84    Ca    8.4<L>      19 Jan 2021 11:11    TPro  7.0  /  Alb  3.2<L>  /  TBili  0.3  /  DBili  x   /  AST  15  /  ALT  14  /  AlkPhos  70  01-19      ABG - ( 19 Jan 2021 16:51 )  pH, Arterial: 7.37  pH, Blood: x     /  pCO2: 41    /  pO2: 83    / HCO3: 23    / Base Excess: -1.7  /  SaO2: 95.6                  MICROBIOLOGY:     RADIOLOGY:  [ ] Reviewed and interpreted by me    EKG:     Interval Events: No events overnight. Seen by both ENT and OMFS.     REVIEW OF SYSTEMS:  Constitutional: [ ] negative [ ] fevers [ ] chills [ ] weight loss [ ] weight gain  HEENT: [ ] negative [ ] dry eyes [ ] eye irritation [ ] postnasal drip [ ] nasal congestion  CV: [ ] negative  [ ] chest pain [ ] orthopnea [ ] palpitations [ ] murmur  Resp: [ ] negative [ ] cough [ ] shortness of breath [ ] dyspnea [ ] wheezing [ ] sputum [ ] hemoptysis  GI: [ ] negative [ ] nausea [ ] vomiting [ ] diarrhea [ ] constipation [ ] abd pain [ ] dysphagia   : [ ] negative [ ] dysuria [ ] nocturia [ ] hematuria [ ] increased urinary frequency  Musculoskeletal: [ ] negative [ ] back pain [ ] myalgias [ ] arthralgias [ ] fracture  Skin: [ ] negative [ ] rash [ ] itch  Neurological: [ ] negative [ ] headache [ ] dizziness [ ] syncope [ ] weakness [ ] numbness  Psychiatric: [ ] negative [ ] anxiety [ ] depression  Endocrine: [ ] negative [ ] diabetes [ ] thyroid problem  Hematologic/Lymphatic: [ ] negative [ ] anemia [ ] bleeding problem  Allergic/Immunologic: [ ] negative [ ] itchy eyes [ ] nasal discharge [ ] hives [ ] angioedema  [ ] All other systems negative  [ ] Unable to assess ROS because ________    OBJECTIVE:  ICU Vital Signs Last 24 Hrs  T(C): 35.7 (20 Jan 2021 04:00), Max: 36.9 (19 Jan 2021 11:35)  T(F): 96.2 (20 Jan 2021 04:00), Max: 98.5 (19 Jan 2021 11:35)  HR: 65 (20 Jan 2021 07:00) (61 - 134)  BP: 117/72 (20 Jan 2021 07:00) (88/59 - 151/94)  BP(mean): 87 (20 Jan 2021 07:00) (69 - 98)  ABP: --  ABP(mean): --  RR: 16 (20 Jan 2021 07:00) (16 - 19)  SpO2: 98% (20 Jan 2021 07:00) (95% - 100%)    Mode: AC/ CMV (Assist Control/ Continuous Mandatory Ventilation)  RR (machine): 16  TV (machine): 400  FiO2: 50  PEEP: 5  ITime: 0.82  MAP: 10  PIP: 22      CAPILLARY BLOOD GLUCOSE      POCT Blood Glucose.: 190 mg/dL (20 Jan 2021 06:33)      PHYSICAL EXAM:  Constitutional: Sedated and intubated, on ventilator     Head:  normocephalic, atraumatic.   GI: normal bowel sounds  Cardio: S1,S2, normal HR  Lungs: bilateral breath sounds  Nose:  Septum intact, midline, deviated.  Inferior turbinates normal bilateral  OC/OP:  ETT in place. Tongue appears large and swollen, but markedly improved from yesterday on my exam. Firm to the touch. Floor of mouth with edema as well but soft when palpated.   Neck:  Submental area full but soft to palpation. Does not feel firm. Trachea midline.  Thyroid, parotid and submandibular glands normal.  Lymph:  No cervical adenopathy palpated.    LINES:    MEDICATIONS  (STANDING):  chlorhexidine 0.12% Liquid 15 milliLiter(s) Oral Mucosa every 12 hours  chlorhexidine 4% Liquid 1 Application(s) Topical <User Schedule>  dexAMETHasone  IVPB 10 milliGRAM(s) IV Intermittent every 6 hours  dextrose 40% Gel 15 Gram(s) Oral once  dextrose 5%. 1000 milliLiter(s) (50 mL/Hr) IV Continuous <Continuous>  dextrose 5%. 1000 milliLiter(s) (100 mL/Hr) IV Continuous <Continuous>  dextrose 50% Injectable 25 Gram(s) IV Push once  dextrose 50% Injectable 12.5 Gram(s) IV Push once  dextrose 50% Injectable 25 Gram(s) IV Push once  diphenhydrAMINE   Injectable 50 milliGRAM(s) IV Push every 8 hours  enoxaparin Injectable 40 milliGRAM(s) SubCutaneous every 12 hours  famotidine Injectable 20 milliGRAM(s) IV Push every 12 hours  glucagon  Injectable 1 milliGRAM(s) IntraMuscular once  insulin lispro (ADMELOG) corrective regimen sliding scale   SubCutaneous every 6 hours  midazolam Infusion 0.02 mG/kG/Hr (2.01 mL/Hr) IV Continuous <Continuous>  piperacillin/tazobactam IVPB.. 3.375 Gram(s) IV Intermittent every 8 hours  propofol Infusion 10 MICROgram(s)/kG/Min (6.04 mL/Hr) IV Continuous <Continuous>  tamoxifen 20 milliGRAM(s) Oral daily    MEDICATIONS  (PRN):      PRN Meds:      LABS:                                   14.4   16.86 )-----------( 356      ( 19 Jan 2021 11:11 )             44.4       Hgb Trend: 14.4<--    01-19    139  |  109<H>  |  14  ----------------------------<  285<H>  3.3<L>   |  20<L>  |  0.84    Ca    8.4<L>      19 Jan 2021 11:11    TPro  7.0  /  Alb  3.2<L>  /  TBili  0.3  /  DBili  x   /  AST  15  /  ALT  14  /  AlkPhos  70  01-19      ABG - ( 19 Jan 2021 16:51 )  pH, Arterial: 7.37  pH, Blood: x     /  pCO2: 41    /  pO2: 83    / HCO3: 23    / Base Excess: -1.7  /  SaO2: 95.6                  MICROBIOLOGY:     RADIOLOGY:  [ ] Reviewed and interpreted by me    EKG:     Interval Events: No events overnight. Seen by both ENT and OMFS. Minimal air leak even after ETT cuff deflated today.     REVIEW OF SYSTEMS:  Constitutional: [ ] negative [ ] fevers [ ] chills [ ] weight loss [ ] weight gain  HEENT: [ ] negative [ ] dry eyes [ ] eye irritation [ ] postnasal drip [ ] nasal congestion  CV: [ ] negative  [ ] chest pain [ ] orthopnea [ ] palpitations [ ] murmur  Resp: [ ] negative [ ] cough [ ] shortness of breath [ ] dyspnea [ ] wheezing [ ] sputum [ ] hemoptysis  GI: [ ] negative [ ] nausea [ ] vomiting [ ] diarrhea [ ] constipation [ ] abd pain [ ] dysphagia   : [ ] negative [ ] dysuria [ ] nocturia [ ] hematuria [ ] increased urinary frequency  Musculoskeletal: [ ] negative [ ] back pain [ ] myalgias [ ] arthralgias [ ] fracture  Skin: [ ] negative [ ] rash [ ] itch  Neurological: [ ] negative [ ] headache [ ] dizziness [ ] syncope [ ] weakness [ ] numbness  Psychiatric: [ ] negative [ ] anxiety [ ] depression  Endocrine: [ ] negative [ ] diabetes [ ] thyroid problem  Hematologic/Lymphatic: [ ] negative [ ] anemia [ ] bleeding problem  Allergic/Immunologic: [ ] negative [ ] itchy eyes [ ] nasal discharge [ ] hives [ ] angioedema  [ ] All other systems negative  [ ] Unable to assess ROS because ________    OBJECTIVE:  ICU Vital Signs Last 24 Hrs  T(C): 35.7 (20 Jan 2021 04:00), Max: 36.9 (19 Jan 2021 11:35)  T(F): 96.2 (20 Jan 2021 04:00), Max: 98.5 (19 Jan 2021 11:35)  HR: 65 (20 Jan 2021 07:00) (61 - 134)  BP: 117/72 (20 Jan 2021 07:00) (88/59 - 151/94)  BP(mean): 87 (20 Jan 2021 07:00) (69 - 98)  ABP: --  ABP(mean): --  RR: 16 (20 Jan 2021 07:00) (16 - 19)  SpO2: 98% (20 Jan 2021 07:00) (95% - 100%)    Mode: AC/ CMV (Assist Control/ Continuous Mandatory Ventilation)  RR (machine): 16  TV (machine): 400  FiO2: 50  PEEP: 5  ITime: 0.82  MAP: 10  PIP: 22      CAPILLARY BLOOD GLUCOSE      POCT Blood Glucose.: 190 mg/dL (20 Jan 2021 06:33)      PHYSICAL EXAM:  Constitutional: Sedated and intubated, on ventilator     Head:  normocephalic, atraumatic.   GI: normal bowel sounds  Cardio: S1,S2, normal HR  Lungs: bilateral breath sounds  Nose:  Septum intact, midline, deviated.  Inferior turbinates normal bilateral  OC/OP:  ETT in place. Tongue appears large and swollen, but markedly improved from yesterday on my exam. Firm to the touch. Floor of mouth with edema as well but soft when palpated.   Neck:  Submental area full but soft to palpation. Does not feel firm. Trachea midline.  Thyroid, parotid and submandibular glands normal.  Lymph:  No cervical adenopathy palpated.    LINES:    MEDICATIONS  (STANDING):  chlorhexidine 0.12% Liquid 15 milliLiter(s) Oral Mucosa every 12 hours  chlorhexidine 4% Liquid 1 Application(s) Topical <User Schedule>  dexAMETHasone  IVPB 10 milliGRAM(s) IV Intermittent every 6 hours  dextrose 40% Gel 15 Gram(s) Oral once  dextrose 5%. 1000 milliLiter(s) (50 mL/Hr) IV Continuous <Continuous>  dextrose 5%. 1000 milliLiter(s) (100 mL/Hr) IV Continuous <Continuous>  dextrose 50% Injectable 25 Gram(s) IV Push once  dextrose 50% Injectable 12.5 Gram(s) IV Push once  dextrose 50% Injectable 25 Gram(s) IV Push once  diphenhydrAMINE   Injectable 50 milliGRAM(s) IV Push every 8 hours  enoxaparin Injectable 40 milliGRAM(s) SubCutaneous every 12 hours  famotidine Injectable 20 milliGRAM(s) IV Push every 12 hours  glucagon  Injectable 1 milliGRAM(s) IntraMuscular once  insulin lispro (ADMELOG) corrective regimen sliding scale   SubCutaneous every 6 hours  midazolam Infusion 0.02 mG/kG/Hr (2.01 mL/Hr) IV Continuous <Continuous>  piperacillin/tazobactam IVPB.. 3.375 Gram(s) IV Intermittent every 8 hours  propofol Infusion 10 MICROgram(s)/kG/Min (6.04 mL/Hr) IV Continuous <Continuous>  tamoxifen 20 milliGRAM(s) Oral daily    MEDICATIONS  (PRN):      PRN Meds:      LABS:                                   14.4   16.86 )-----------( 356      ( 19 Jan 2021 11:11 )             44.4       Hgb Trend: 14.4<--    01-19    139  |  109<H>  |  14  ----------------------------<  285<H>  3.3<L>   |  20<L>  |  0.84    Ca    8.4<L>      19 Jan 2021 11:11    TPro  7.0  /  Alb  3.2<L>  /  TBili  0.3  /  DBili  x   /  AST  15  /  ALT  14  /  AlkPhos  70  01-19      ABG - ( 19 Jan 2021 16:51 )  pH, Arterial: 7.37  pH, Blood: x     /  pCO2: 41    /  pO2: 83    / HCO3: 23    / Base Excess: -1.7  /  SaO2: 95.6                  MICROBIOLOGY:     RADIOLOGY:  [ ] Reviewed and interpreted by me    EKG:

## 2021-01-20 NOTE — PROGRESS NOTE ADULT - ASSESSMENT
50 year old female with PMH of HTN, DM who initially came to [place] ED complaining of swelling and pain to the tongue concerning for infection vs. nichelle angina vs. unspecified edema.    Neuro:   - Intubated adequately on versed and propofol drips    CV:   - History of HTN, currently BP stable.   - Hemodynamically stable at this time, MAP>65 off pressors    Respiratory:   #Upper airway obstruction 2/2 nichelle angina vs. angioedema vs. allergy  - Received some steroids at OSH ED, started on zosyn  - OMFS: - No acute OMFS intervention, teeth clinically and radiographically without decay, CT findings highly inconsistent with odontogenic source of infection. C/W ABx  - ENT: Recommend angioedema cocktail (Benadryl 50mg IV q8h, Famotidine 20mg IV q12h, Dexamethasone 10mg q6h), update chart to reflect possible allergy to enalapril   - Zosyn for infection (1/19-)  - Vent settings: VC: RR (machine): 16,  FiO2: 50 PEEP: 5 PIP: 22. Plan to wean down FiO2 check ABG later      Endo:   DM- Diabetic on metformin at home.   - ISS q6  - Check A1c in AM.  - NG tube feeds to start if not considering extubation  - FS- 190-200    Heme/ONC  Breast Cancer on tamoxifen since 2019  - Continue tamoxifen through OG tube    GI  NPO for now in case of surgical need.    ID:   Swelling of roof of mouth, likely 2/2 ludwigs angina vs. angioedema vs. allergy  - F/U OMFS, ENT recommendations as above    Renal:   Normal Kidney Function, trend I+Os    Cristobal Lawrenceu  PGY-2   50 year old female with PMH of HTN, DM who initially came to [place] ED complaining of swelling and pain to the tongue concerning for infection vs. nichelle angina vs. unspecified edema.    Neuro:   - Intubated adequately on versed and propofol drips    CV:   - History of HTN, currently BP stable.   - Hemodynamically stable at this time, MAP>65 off pressors    Respiratory:   #Upper airway obstruction 2/2 nichelle angina vs. angioedema vs. allergy  - Received some steroids at OSH ED, started on zosyn  - OMFS: - No acute OMFS intervention, teeth clinically and radiographically without decay, CT findings highly inconsistent with odontogenic source of infection. C/W ABx  - ENT: Recommend angioedema cocktail (Benadryl 50mg IV q8h, Famotidine 20mg IV q12h, Dexamethasone decreased to 4mg q6h), update chart to reflect possible allergy to enalapril   - Zosyn for infection (1/19-)  - Vent settings: VC: RR (machine): 16, , decreased to FiO2: 40, PEEP: 5 PIP: 22. Monitor O2 saturation      Endo:   DM- Diabetic on metformin at home.   - ISS q6  - Check A1c in AM.  - NG tube feeds to start if not considering extubation  - FS- 190-200    Heme/ONC  Breast Cancer on tamoxifen since 2019  - Continue tamoxifen through OG tube    GI  Start glucerna through OG    ID:   Swelling of roof of mouth, likely 2/2 ludwigs angina vs. angioedema vs. allergy  - F/U OMFS, ENT recommendations as above  - No cultures (strep/throat or blood drawn so far) would continue to monitor. Patient without fevers, systemic symptoms prior to intubation. leukocytosis likely 2/2 high doses IV steroids. If febrile, or not improving on exam, culture.     Renal:   Normal Kidney Function, trend I+Os    Cristobal Villafana  PGY-2

## 2021-01-20 NOTE — PROGRESS NOTE ADULT - SUBJECTIVE AND OBJECTIVE BOX
Patient intubated with angioedema. Evaluated By ENT attending. Plan is to continue steroids x 24 hours. Continue PPI and H2 blockers. Follow up OMFS recs. Extubate as per MICU.

## 2021-01-21 LAB
ALBUMIN SERPL ELPH-MCNC: 3.5 G/DL — SIGNIFICANT CHANGE UP (ref 3.3–5)
ALP SERPL-CCNC: 55 U/L — SIGNIFICANT CHANGE UP (ref 40–120)
ALT FLD-CCNC: 8 U/L — SIGNIFICANT CHANGE UP (ref 4–33)
ANION GAP SERPL CALC-SCNC: 13 MMOL/L — SIGNIFICANT CHANGE UP (ref 7–14)
AST SERPL-CCNC: 11 U/L — SIGNIFICANT CHANGE UP (ref 4–32)
BASOPHILS # BLD AUTO: 0.03 K/UL — SIGNIFICANT CHANGE UP (ref 0–0.2)
BASOPHILS NFR BLD AUTO: 0.2 % — SIGNIFICANT CHANGE UP (ref 0–2)
BILIRUB SERPL-MCNC: <0.2 MG/DL — SIGNIFICANT CHANGE UP (ref 0.2–1.2)
BUN SERPL-MCNC: 18 MG/DL — SIGNIFICANT CHANGE UP (ref 7–23)
CALCIUM SERPL-MCNC: 8.7 MG/DL — SIGNIFICANT CHANGE UP (ref 8.4–10.5)
CHLORIDE SERPL-SCNC: 108 MMOL/L — HIGH (ref 98–107)
CO2 SERPL-SCNC: 23 MMOL/L — SIGNIFICANT CHANGE UP (ref 22–31)
CREAT SERPL-MCNC: 0.95 MG/DL — SIGNIFICANT CHANGE UP (ref 0.5–1.3)
EOSINOPHIL # BLD AUTO: 0 K/UL — SIGNIFICANT CHANGE UP (ref 0–0.5)
EOSINOPHIL NFR BLD AUTO: 0 % — SIGNIFICANT CHANGE UP (ref 0–6)
GLUCOSE BLDC GLUCOMTR-MCNC: 174 MG/DL — HIGH (ref 70–99)
GLUCOSE BLDC GLUCOMTR-MCNC: 188 MG/DL — HIGH (ref 70–99)
GLUCOSE BLDC GLUCOMTR-MCNC: 204 MG/DL — HIGH (ref 70–99)
GLUCOSE BLDC GLUCOMTR-MCNC: 248 MG/DL — HIGH (ref 70–99)
GLUCOSE BLDC GLUCOMTR-MCNC: 250 MG/DL — HIGH (ref 70–99)
GLUCOSE SERPL-MCNC: 218 MG/DL — HIGH (ref 70–99)
HCT VFR BLD CALC: 37.8 % — SIGNIFICANT CHANGE UP (ref 34.5–45)
HGB BLD-MCNC: 12.5 G/DL — SIGNIFICANT CHANGE UP (ref 11.5–15.5)
IANC: 15.89 K/UL — HIGH (ref 1.5–8.5)
IMM GRANULOCYTES NFR BLD AUTO: 2.6 % — HIGH (ref 0–1.5)
LYMPHOCYTES # BLD AUTO: 1.26 K/UL — SIGNIFICANT CHANGE UP (ref 1–3.3)
LYMPHOCYTES # BLD AUTO: 6.8 % — LOW (ref 13–44)
MAGNESIUM SERPL-MCNC: 2.2 MG/DL — SIGNIFICANT CHANGE UP (ref 1.6–2.6)
MCHC RBC-ENTMCNC: 30.2 PG — SIGNIFICANT CHANGE UP (ref 27–34)
MCHC RBC-ENTMCNC: 33.1 GM/DL — SIGNIFICANT CHANGE UP (ref 32–36)
MCV RBC AUTO: 91.3 FL — SIGNIFICANT CHANGE UP (ref 80–100)
MONOCYTES # BLD AUTO: 0.86 K/UL — SIGNIFICANT CHANGE UP (ref 0–0.9)
MONOCYTES NFR BLD AUTO: 4.6 % — SIGNIFICANT CHANGE UP (ref 2–14)
NEUTROPHILS # BLD AUTO: 15.89 K/UL — HIGH (ref 1.8–7.4)
NEUTROPHILS NFR BLD AUTO: 85.8 % — HIGH (ref 43–77)
NRBC # BLD: 0 /100 WBCS — SIGNIFICANT CHANGE UP
NRBC # FLD: 0 K/UL — SIGNIFICANT CHANGE UP
PHOSPHATE SERPL-MCNC: 2.2 MG/DL — LOW (ref 2.5–4.5)
PLATELET # BLD AUTO: 280 K/UL — SIGNIFICANT CHANGE UP (ref 150–400)
POTASSIUM SERPL-MCNC: 3.6 MMOL/L — SIGNIFICANT CHANGE UP (ref 3.5–5.3)
POTASSIUM SERPL-SCNC: 3.6 MMOL/L — SIGNIFICANT CHANGE UP (ref 3.5–5.3)
PROT SERPL-MCNC: 6 G/DL — SIGNIFICANT CHANGE UP (ref 6–8.3)
RBC # BLD: 4.14 M/UL — SIGNIFICANT CHANGE UP (ref 3.8–5.2)
RBC # FLD: 13.4 % — SIGNIFICANT CHANGE UP (ref 10.3–14.5)
SODIUM SERPL-SCNC: 144 MMOL/L — SIGNIFICANT CHANGE UP (ref 135–145)
WBC # BLD: 18.53 K/UL — HIGH (ref 3.8–10.5)
WBC # FLD AUTO: 18.53 K/UL — HIGH (ref 3.8–10.5)

## 2021-01-21 PROCEDURE — 99291 CRITICAL CARE FIRST HOUR: CPT

## 2021-01-21 RX ORDER — MIDAZOLAM HYDROCHLORIDE 1 MG/ML
4 INJECTION, SOLUTION INTRAMUSCULAR; INTRAVENOUS ONCE
Refills: 0 | Status: DISCONTINUED | OUTPATIENT
Start: 2021-01-21 | End: 2021-01-21

## 2021-01-21 RX ORDER — DEXTROSE 50 % IN WATER 50 %
25 SYRINGE (ML) INTRAVENOUS ONCE
Refills: 0 | Status: DISCONTINUED | OUTPATIENT
Start: 2021-01-21 | End: 2021-01-23

## 2021-01-21 RX ORDER — HUMAN INSULIN 100 [IU]/ML
2 INJECTION, SUSPENSION SUBCUTANEOUS EVERY 6 HOURS
Refills: 0 | Status: DISCONTINUED | OUTPATIENT
Start: 2021-01-21 | End: 2021-01-21

## 2021-01-21 RX ORDER — DEXTROSE 50 % IN WATER 50 %
12.5 SYRINGE (ML) INTRAVENOUS ONCE
Refills: 0 | Status: DISCONTINUED | OUTPATIENT
Start: 2021-01-21 | End: 2021-01-23

## 2021-01-21 RX ORDER — INSULIN LISPRO 100/ML
VIAL (ML) SUBCUTANEOUS
Refills: 0 | Status: DISCONTINUED | OUTPATIENT
Start: 2021-01-21 | End: 2021-01-23

## 2021-01-21 RX ORDER — INSULIN LISPRO 100/ML
VIAL (ML) SUBCUTANEOUS AT BEDTIME
Refills: 0 | Status: DISCONTINUED | OUTPATIENT
Start: 2021-01-21 | End: 2021-01-23

## 2021-01-21 RX ORDER — DEXAMETHASONE 0.5 MG/5ML
4 ELIXIR ORAL EVERY 8 HOURS
Refills: 0 | Status: COMPLETED | OUTPATIENT
Start: 2021-01-21 | End: 2021-01-22

## 2021-01-21 RX ORDER — GLUCAGON INJECTION, SOLUTION 0.5 MG/.1ML
1 INJECTION, SOLUTION SUBCUTANEOUS ONCE
Refills: 0 | Status: DISCONTINUED | OUTPATIENT
Start: 2021-01-21 | End: 2021-01-23

## 2021-01-21 RX ORDER — DEXMEDETOMIDINE HYDROCHLORIDE IN 0.9% SODIUM CHLORIDE 4 UG/ML
0.5 INJECTION INTRAVENOUS
Qty: 400 | Refills: 0 | Status: DISCONTINUED | OUTPATIENT
Start: 2021-01-21 | End: 2021-01-21

## 2021-01-21 RX ORDER — SODIUM CHLORIDE 9 MG/ML
1000 INJECTION, SOLUTION INTRAVENOUS
Refills: 0 | Status: DISCONTINUED | OUTPATIENT
Start: 2021-01-21 | End: 2021-01-23

## 2021-01-21 RX ORDER — LABETALOL HCL 100 MG
100 TABLET ORAL
Refills: 0 | Status: DISCONTINUED | OUTPATIENT
Start: 2021-01-21 | End: 2021-01-21

## 2021-01-21 RX ORDER — INSULIN GLARGINE 100 [IU]/ML
3 INJECTION, SOLUTION SUBCUTANEOUS AT BEDTIME
Refills: 0 | Status: DISCONTINUED | OUTPATIENT
Start: 2021-01-21 | End: 2021-01-23

## 2021-01-21 RX ORDER — DEXTROSE 50 % IN WATER 50 %
15 SYRINGE (ML) INTRAVENOUS ONCE
Refills: 0 | Status: DISCONTINUED | OUTPATIENT
Start: 2021-01-21 | End: 2021-01-23

## 2021-01-21 RX ORDER — POTASSIUM PHOSPHATE, MONOBASIC POTASSIUM PHOSPHATE, DIBASIC 236; 224 MG/ML; MG/ML
30 INJECTION, SOLUTION INTRAVENOUS ONCE
Refills: 0 | Status: COMPLETED | OUTPATIENT
Start: 2021-01-21 | End: 2021-01-21

## 2021-01-21 RX ADMIN — Medication 4 MILLIGRAM(S): at 06:14

## 2021-01-21 RX ADMIN — HUMAN INSULIN 2 UNIT(S): 100 INJECTION, SUSPENSION SUBCUTANEOUS at 12:04

## 2021-01-21 RX ADMIN — ENOXAPARIN SODIUM 40 MILLIGRAM(S): 100 INJECTION SUBCUTANEOUS at 17:07

## 2021-01-21 RX ADMIN — DEXMEDETOMIDINE HYDROCHLORIDE IN 0.9% SODIUM CHLORIDE 12.6 MICROGRAM(S)/KG/HR: 4 INJECTION INTRAVENOUS at 07:22

## 2021-01-21 RX ADMIN — Medication 2: at 00:14

## 2021-01-21 RX ADMIN — Medication 4 MILLIGRAM(S): at 22:07

## 2021-01-21 RX ADMIN — CHLORHEXIDINE GLUCONATE 1 APPLICATION(S): 213 SOLUTION TOPICAL at 07:23

## 2021-01-21 RX ADMIN — MIDAZOLAM HYDROCHLORIDE 4 MILLIGRAM(S): 1 INJECTION, SOLUTION INTRAMUSCULAR; INTRAVENOUS at 02:00

## 2021-01-21 RX ADMIN — AMPICILLIN SODIUM AND SULBACTAM SODIUM 200 GRAM(S): 250; 125 INJECTION, POWDER, FOR SUSPENSION INTRAMUSCULAR; INTRAVENOUS at 17:07

## 2021-01-21 RX ADMIN — PROPOFOL 6.04 MICROGRAM(S)/KG/MIN: 10 INJECTION, EMULSION INTRAVENOUS at 07:23

## 2021-01-21 RX ADMIN — Medication 50 MILLIGRAM(S): at 06:14

## 2021-01-21 RX ADMIN — INSULIN GLARGINE 3 UNIT(S): 100 INJECTION, SOLUTION SUBCUTANEOUS at 23:08

## 2021-01-21 RX ADMIN — Medication 2: at 12:04

## 2021-01-21 RX ADMIN — Medication 2: at 06:15

## 2021-01-21 RX ADMIN — Medication 50 MILLIGRAM(S): at 13:52

## 2021-01-21 RX ADMIN — Medication 1: at 17:07

## 2021-01-21 RX ADMIN — Medication 4 MILLIGRAM(S): at 17:07

## 2021-01-21 RX ADMIN — AMPICILLIN SODIUM AND SULBACTAM SODIUM 200 GRAM(S): 250; 125 INJECTION, POWDER, FOR SUSPENSION INTRAMUSCULAR; INTRAVENOUS at 22:07

## 2021-01-21 RX ADMIN — Medication 4 MILLIGRAM(S): at 11:38

## 2021-01-21 RX ADMIN — POTASSIUM PHOSPHATE, MONOBASIC POTASSIUM PHOSPHATE, DIBASIC 83.33 MILLIMOLE(S): 236; 224 INJECTION, SOLUTION INTRAVENOUS at 04:00

## 2021-01-21 RX ADMIN — HUMAN INSULIN 2 UNIT(S): 100 INJECTION, SUSPENSION SUBCUTANEOUS at 17:07

## 2021-01-21 RX ADMIN — CHLORHEXIDINE GLUCONATE 15 MILLILITER(S): 213 SOLUTION TOPICAL at 06:13

## 2021-01-21 RX ADMIN — AMPICILLIN SODIUM AND SULBACTAM SODIUM 200 GRAM(S): 250; 125 INJECTION, POWDER, FOR SUSPENSION INTRAMUSCULAR; INTRAVENOUS at 00:14

## 2021-01-21 RX ADMIN — ENOXAPARIN SODIUM 40 MILLIGRAM(S): 100 INJECTION SUBCUTANEOUS at 06:14

## 2021-01-21 RX ADMIN — AMPICILLIN SODIUM AND SULBACTAM SODIUM 200 GRAM(S): 250; 125 INJECTION, POWDER, FOR SUSPENSION INTRAMUSCULAR; INTRAVENOUS at 06:13

## 2021-01-21 RX ADMIN — DEXMEDETOMIDINE HYDROCHLORIDE IN 0.9% SODIUM CHLORIDE 12.6 MICROGRAM(S)/KG/HR: 4 INJECTION INTRAVENOUS at 02:04

## 2021-01-21 RX ADMIN — Medication 50 MILLIGRAM(S): at 22:07

## 2021-01-21 RX ADMIN — Medication 4 MILLIGRAM(S): at 00:14

## 2021-01-21 RX ADMIN — AMPICILLIN SODIUM AND SULBACTAM SODIUM 200 GRAM(S): 250; 125 INJECTION, POWDER, FOR SUSPENSION INTRAMUSCULAR; INTRAVENOUS at 11:38

## 2021-01-21 RX ADMIN — FAMOTIDINE 20 MILLIGRAM(S): 10 INJECTION INTRAVENOUS at 06:13

## 2021-01-21 NOTE — PROGRESS NOTE ADULT - ASSESSMENT
50 year old female with PMH of HTN, DM who initially came to [place] ED complaining of swelling and pain to the tongue concerning for infection vs. nichelle angina vs. unspecified edema.    Neuro:   - Intubated adequately on precedex and propofol drips    CV:   - History of HTN, currently BP stable.   - Hemodynamically stable at this time, MAP>65 off pressors    Respiratory:   #Upper airway obstruction 2/2 nichelle angina vs. angioedema vs. allergy  - Received some steroids at OSH ED, started on zosyn  - OMFS: - No acute OMFS intervention, teeth clinically and radiographically without decay, CT findings highly inconsistent with odontogenic source of infection. C/W ABx  - ENT: Recommend angioedema cocktail (Benadryl 50mg IV q8h, Famotidine 20mg IV q12h, Dexamethasone decreased to 4mg q6h), update chart to reflect possible allergy to enalapril   - Zosyn for infection (1/19-) switched to unasyn (1/20-)  - Vent settings: VC: RR (machine): 16, ,  FiO2: 40, PEEP: 5 PIP: 22. Monitor O2 saturation      Endo:   DM- Diabetic on metformin at home.   - ISS q6  - Check A1c in AM.  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 angioedema vs. allergy  - F/U OMFS, ENT recommendations as above  - No cultures (strep/throat or blood drawn so far) would continue to monitor. Patient without fevers, systemic symptoms prior to intubation. leukocytosis likely 2/2 high doses IV steroids. If febrile, or not improving on exam, culture.     Renal:   Normal Kidney Function, trend I+Os    Cristobal Villafana  PGY-2   50 year old female with PMH of HTN, DM who initially came to [place] ED complaining of swelling and pain to the tongue concerning for infection vs. nichelle angina vs. unspecified edema.    Neuro:   - Intubated adequately on precedex and propofol drips    CV:   - History of HTN, currently BP stable.   - Hemodynamically stable at this time, MAP>65 off pressors    Respiratory:   #Upper airway obstruction 2/2 nichelle angina vs. angioedema vs. allergy  - Received some steroids at OSH ED, started on zosyn now on unasyn  - OMFS: - No acute OMFS intervention, teeth clinically and radiographically without decay, CT findings highly inconsistent with odontogenic source of infection. C/W ABx  - ENT: Recommend angioedema cocktail (Benadryl 50mg IV q8h, Famotidine 20mg IV q12h, Dexamethasone decreased to 4mg q6h), update chart to reflect possible allergy to enalapril   - Zosyn for infection (1/19-) switched to unasyn (1/20-), will continue for 2 to 3 weeks per guidelines. Can utilize CRP as marker of treatment success  - Extubated 1/21 to BIPAP, satting high 90's, no evidence of stridor.      Endo:   DM- Diabetic on metformin at home.   - ISS q6  - Check A1c in AM.  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 angioedema vs. allergy  - F/U OMFS, ENT recommendations as above  - No cultures (strep/throat or blood drawn so far) would continue to monitor. Patient without fevers, systemic symptoms prior to intubation. leukocytosis likely 2/2 high doses IV steroids. If febrile, or not improving on exam, culture.     Renal:   Normal Kidney Function, trend I+Os    Cristobal Villafana  PGY-2

## 2021-01-21 NOTE — DIETITIAN INITIAL EVALUATION ADULT. - ORAL INTAKE PTA/DIET HISTORY
Pt. was intubated , and per nursing is  being weaned off sedation for extubation . Unable to obtain nutrition hx. @ present .

## 2021-01-21 NOTE — CHART NOTE - NSCHARTNOTEFT_GEN_A_CORE
MICU Transfer Note    Transfer from: MICU    Transfer to: ( x ) Medicine    (  ) Telemetry     (   ) RCU        (    ) Palliative         (   ) Stroke Unit          (   ) __________________    Accepting Physician:      MICU COURSE:  50 year old female with PMH of HTN, DM, breast cancer diagnosed 2019, (on tamoxifen) who initially came to [place] ED complaining of swelling and pain to the tongue. Pt states no new meds and no new soaps/detergents/contact with anything. Mentions the swelling started Tuesday and she was sent home from work. Over the weekend the symptoms somewhat improved. However overnight from monday to tuesday, she noticed tongue swelling with trouble talking. She denies any other symptoms. She had no temperature at home, did not feel generally ill or weak. He only complaints are those localized to the pain and swelling of mouth/throat. She denies shortness of breath, fevers, chills. Of note patient is on Enalapril for HTN and has been for years. ADDITIONALLY, last year she was notes after she was started on tamoxifen in 2019, she had an episode of facial swelling which resolved.  She denies no new foods or environmental exposures that she knows of. Denies hx of drug or environmental allergies. She has worked as a  for 13 years.     Patient was seen by ENT and OMFS both who recommended continuing steroids and IV antibiotics. Patient had great improvement in swelling over two days. Noted on day 1 with no air leak after deflating ETT cuff. On day 2 patient had exceptional improvement and was weaned from sedation. She was extubated to BIPAP then weaned to NC with good effect.       Vital Signs Last 24 Hrs  T(C): 37.1 (21 Jan 2021 16:00), Max: 37.6 (21 Jan 2021 04:00)  T(F): 98.7 (21 Jan 2021 16:00), Max: 99.6 (21 Jan 2021 04:00)  HR: 71 (21 Jan 2021 16:00) (58 - 85)  BP: 106/62 (21 Jan 2021 16:00) (87/44 - 165/74)  BP(mean): 76 (21 Jan 2021 16:00) (58 - 112)  RR: 21 (21 Jan 2021 16:00) (16 - 22)  SpO2: 94% (21 Jan 2021 16:00) (92% - 98%)  I&O's Summary    20 Jan 2021 07:01  -  21 Jan 2021 07:00  --------------------------------------------------------  IN: 2363.8 mL / OUT: 1645 mL / NET: 718.8 mL    21 Jan 2021 07:01  -  21 Jan 2021 16:45  --------------------------------------------------------  IN: 241.8 mL / OUT: 1060 mL / NET: -818.2 mL        MEDICATIONS  (STANDING):  ampicillin/sulbactam  IVPB 3 Gram(s) IV Intermittent every 6 hours  chlorhexidine 4% Liquid 1 Application(s) Topical <User Schedule>  dexAMETHasone  Injectable 4 milliGRAM(s) IV Push every 6 hours  dextrose 40% Gel 15 Gram(s) Oral once  dextrose 5%. 1000 milliLiter(s) (50 mL/Hr) IV Continuous <Continuous>  dextrose 5%. 1000 milliLiter(s) (100 mL/Hr) IV Continuous <Continuous>  dextrose 50% Injectable 25 Gram(s) IV Push once  dextrose 50% Injectable 12.5 Gram(s) IV Push once  dextrose 50% Injectable 25 Gram(s) IV Push once  diphenhydrAMINE   Injectable 50 milliGRAM(s) IV Push every 8 hours  enoxaparin Injectable 40 milliGRAM(s) SubCutaneous every 12 hours  famotidine Injectable 20 milliGRAM(s) IV Push every 12 hours  glucagon  Injectable 1 milliGRAM(s) IntraMuscular once  insulin lispro (ADMELOG) corrective regimen sliding scale   SubCutaneous every 6 hours  insulin NPH human recombinant 2 Unit(s) SubCutaneous every 6 hours  tamoxifen 20 milliGRAM(s) Oral daily    MEDICATIONS  (PRN):        LABS                              144    |  108    |  18                  Calcium: 8.7   / iCa: x      (01-21 @ 01:33)    ----------------------------<  218       Magnesium: 2.2                              3.6     |  23     |  0.95             Phosphorous: 2.2      TPro  6.0    /  Alb  3.5    /  TBili  <0.2   /  DBili  x      /  AST  11     /  ALT  8      /  AlkPhos  55     21 Jan 2021 01:33                                                  12.5                  Neurophils% (auto):   85.8   (01-21 @ 01:33):    18.53)-----------(280          Lymphocytes% (auto):  6.8                                           37.8                   Eosinphils% (auto):   0.0      Manual%: Neutrophils x    ; Lymphocytes x    ; Eosinophils x    ; Bands%: x    ; Blasts x        ASSESSMENT & PLAN:   50 year old female with PMH of HTN, DM who initially came to [place] ED complaining of swelling and pain to the tongue concerning for infection vs. nichelle angina vs. unspecified edema.    Neuro:   - Intubated adequately on precedex and propofol drips    CV:   - History of HTN, currently BP stable.   - Hemodynamically stable at this time, MAP>65 off pressors    Respiratory:   #Upper airway obstruction 2/2 nichelle angina vs. angioedema vs. allergy  - Received some steroids at OSH ED, started on zosyn now on unasyn  - OMFS: - No acute OMFS intervention, teeth clinically and radiographically without decay, CT findings highly inconsistent with odontogenic source of infection. C/W ABx  - ENT: Recommend angioedema cocktail (Benadryl 50mg IV q8h, Famotidine 20mg IV q12h, Dexamethasone decreased to 4mg q6h), update chart to reflect possible allergy to enalapril   - Zosyn for infection (1/19-) switched to unasyn (1/20-), will continue for 2 to 3 weeks per guidelines. Can utilize CRP as marker of treatment success  - Extubated 1/21 to BIPAP, satting high 90's, no evidence of stridor.      Endo:   DM- Diabetic on metformin at home.   - ISS q6  - Check A1c in AM.  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 angioedema vs. allergy  - F/U OMFS, ENT recommendations as above  - No cultures (strep/throat or blood drawn so far) would continue to monitor. Patient without fevers, systemic symptoms prior to intubation. leukocytosis likely 2/2 high doses IV steroids. If febrile, or not improving on exam, culture.     Renal:   Normal Kidney Function, trend I+Os      For Followup:    [ ] Monitor airway   [ ] Wean steroids  [ ] Continue antibiotics (unasyn) for at least 2 weeks for nichelle's angina/tonsillitis  [ ] F/U ENT/OMFS recs    Cristobal Villafana   PGY-2 MICU Transfer Note    Transfer from: MICU    Transfer to: ( x ) Medicine    (  ) Telemetry     (   ) RCU        (    ) Palliative         (   ) Stroke Unit          (   ) __________________    Accepting Physician:      MICU COURSE:  50 year old female with PMH of HTN, DM, breast cancer diagnosed 2019, (on tamoxifen) who initially came to [place] ED complaining of swelling and pain to the tongue. Pt states no new meds and no new soaps/detergents/contact with anything. Mentions the swelling started Tuesday and she was sent home from work. Over the weekend the symptoms somewhat improved. However overnight from monday to tuesday, she noticed tongue swelling with trouble talking. She denies any other symptoms. She had no temperature at home, did not feel generally ill or weak. He only complaints are those localized to the pain and swelling of mouth/throat. She denies shortness of breath, fevers, chills. Of note patient is on Enalapril for HTN and has been for years. ADDITIONALLY, last year she was notes after she was started on tamoxifen in 2019, she had an episode of facial swelling which resolved.  She denies no new foods or environmental exposures that she knows of. Denies hx of drug or environmental allergies. She has worked as a  for 13 years.     Patient was seen by ENT and OMFS both who recommended continuing steroids and IV antibiotics. Patient had great improvement in swelling over two days. Noted on day 1 with no air leak after deflating ETT cuff. On day 2 patient had exceptional improvement and was weaned from sedation. She was extubated to BIPAP then weaned to NC with good effect.       Vital Signs Last 24 Hrs  T(C): 37.1 (21 Jan 2021 16:00), Max: 37.6 (21 Jan 2021 04:00)  T(F): 98.7 (21 Jan 2021 16:00), Max: 99.6 (21 Jan 2021 04:00)  HR: 71 (21 Jan 2021 16:00) (58 - 85)  BP: 106/62 (21 Jan 2021 16:00) (87/44 - 165/74)  BP(mean): 76 (21 Jan 2021 16:00) (58 - 112)  RR: 21 (21 Jan 2021 16:00) (16 - 22)  SpO2: 94% (21 Jan 2021 16:00) (92% - 98%)  I&O's Summary    20 Jan 2021 07:01  -  21 Jan 2021 07:00  --------------------------------------------------------  IN: 2363.8 mL / OUT: 1645 mL / NET: 718.8 mL    21 Jan 2021 07:01  -  21 Jan 2021 16:45  --------------------------------------------------------  IN: 241.8 mL / OUT: 1060 mL / NET: -818.2 mL        MEDICATIONS  (STANDING):  ampicillin/sulbactam  IVPB 3 Gram(s) IV Intermittent every 6 hours  chlorhexidine 4% Liquid 1 Application(s) Topical <User Schedule>  dexAMETHasone  Injectable 4 milliGRAM(s) IV Push every 6 hours  dextrose 40% Gel 15 Gram(s) Oral once  dextrose 5%. 1000 milliLiter(s) (50 mL/Hr) IV Continuous <Continuous>  dextrose 5%. 1000 milliLiter(s) (100 mL/Hr) IV Continuous <Continuous>  dextrose 50% Injectable 25 Gram(s) IV Push once  dextrose 50% Injectable 12.5 Gram(s) IV Push once  dextrose 50% Injectable 25 Gram(s) IV Push once  diphenhydrAMINE   Injectable 50 milliGRAM(s) IV Push every 8 hours  enoxaparin Injectable 40 milliGRAM(s) SubCutaneous every 12 hours  famotidine Injectable 20 milliGRAM(s) IV Push every 12 hours  glucagon  Injectable 1 milliGRAM(s) IntraMuscular once  insulin lispro (ADMELOG) corrective regimen sliding scale   SubCutaneous every 6 hours  insulin NPH human recombinant 2 Unit(s) SubCutaneous every 6 hours  tamoxifen 20 milliGRAM(s) Oral daily    MEDICATIONS  (PRN):        LABS                              144    |  108    |  18                  Calcium: 8.7   / iCa: x      (01-21 @ 01:33)    ----------------------------<  218       Magnesium: 2.2                              3.6     |  23     |  0.95             Phosphorous: 2.2      TPro  6.0    /  Alb  3.5    /  TBili  <0.2   /  DBili  x      /  AST  11     /  ALT  8      /  AlkPhos  55     21 Jan 2021 01:33                                                  12.5                  Neurophils% (auto):   85.8   (01-21 @ 01:33):    18.53)-----------(280          Lymphocytes% (auto):  6.8                                           37.8                   Eosinphils% (auto):   0.0      Manual%: Neutrophils x    ; Lymphocytes x    ; Eosinophils x    ; Bands%: x    ; Blasts x        ASSESSMENT & PLAN:   50 year old female with PMH of HTN, DM who initially came to [place] ED complaining of swelling and pain to the tongue concerning for infection vs. nichelle angina vs. unspecified edema.    Neuro:   - Intubated adequately on precedex and propofol drips    CV:   - History of HTN, currently BP stable.   - Hemodynamically stable at this time, MAP>65 off pressors    Respiratory:   #Upper airway obstruction 2/2 nichelle angina vs. angioedema vs. allergy  - Received some steroids at OSH ED, started on zosyn now on unasyn  - OMFS: - No acute OMFS intervention, teeth clinically and radiographically without decay, CT findings highly inconsistent with odontogenic source of infection. C/W ABx  - ENT: Recommend angioedema cocktail (Benadryl 50mg IV q8h, Famotidine 20mg IV q12h, Dexamethasone decreased to 4mg q6h), update chart to reflect possible allergy to enalapril   - Zosyn for infection (1/19-) switched to unasyn (1/20-), will continue for 2 to 3 weeks per guidelines. Can utilize CRP as marker of treatment success  - Extubated 1/21 to BIPAP, satting high 90's, no evidence of stridor.      Endo:   DM- Diabetic on metformin at home.   - ISS q6  - Check A1c in AM.  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 angioedema vs. allergy  - F/U OMFS, ENT recommendations as above  - No cultures (strep/throat or blood drawn so far) would continue to monitor. Patient without fevers, systemic symptoms prior to intubation. leukocytosis likely 2/2 high doses IV steroids. If febrile, or not improving on exam, culture.     Renal:   Normal Kidney Function, trend I+Os      For Followup:    [ ] Monitor airway   [ ] Wean steroids  [ ] On reduced dose Lantus 3U at bedtime with MALIA 2/2 anticipated decreased PO; adjust per requirement  [ ] Continue antibiotics (unasyn) for at least 2 weeks for nichelle's angina/tonsillitis  [ ] F/U ENT/OMFS recs    Cristobal Villafana   PGY-2

## 2021-01-21 NOTE — PROGRESS NOTE ADULT - SUBJECTIVE AND OBJECTIVE BOX
Interval Events: No events overnight. Received versed push and precedex gtt started.    REVIEW OF SYSTEMS:  Constitutional: [ ] negative [ ] fevers [ ] chills [ ] weight loss [ ] weight gain  HEENT: [ ] negative [ ] dry eyes [ ] eye irritation [ ] postnasal drip [ ] nasal congestion  CV: [ ] negative  [ ] chest pain [ ] orthopnea [ ] palpitations [ ] murmur  Resp: [ ] negative [ ] cough [ ] shortness of breath [ ] dyspnea [ ] wheezing [ ] sputum [ ] hemoptysis  GI: [ ] negative [ ] nausea [ ] vomiting [ ] diarrhea [ ] constipation [ ] abd pain [ ] dysphagia   : [ ] negative [ ] dysuria [ ] nocturia [ ] hematuria [ ] increased urinary frequency  Musculoskeletal: [ ] negative [ ] back pain [ ] myalgias [ ] arthralgias [ ] fracture  Skin: [ ] negative [ ] rash [ ] itch  Neurological: [ ] negative [ ] headache [ ] dizziness [ ] syncope [ ] weakness [ ] numbness  Psychiatric: [ ] negative [ ] anxiety [ ] depression  Endocrine: [ ] negative [ ] diabetes [ ] thyroid problem  Hematologic/Lymphatic: [ ] negative [ ] anemia [ ] bleeding problem  Allergic/Immunologic: [ ] negative [ ] itchy eyes [ ] nasal discharge [ ] hives [ ] angioedema  [ ] All other systems negative  [ x] Unable to assess ROS because sedated patient    OBJECTIVE:  ICU Vital Signs Last 24 Hrs  T(C): 37.6 (21 Jan 2021 04:00), Max: 37.6 (21 Jan 2021 04:00)  T(F): 99.6 (21 Jan 2021 04:00), Max: 99.6 (21 Jan 2021 04:00)  HR: 64 (21 Jan 2021 06:52) (62 - 85)  BP: 159/93 (21 Jan 2021 06:00) (100/62 - 159/93)  BP(mean): 112 (21 Jan 2021 06:00) (73 - 112)  ABP: --  ABP(mean): --  RR: 17 (21 Jan 2021 06:00) (16 - 25)  SpO2: 93% (21 Jan 2021 06:52) (92% - 97%)    Mode: AC/ CMV (Assist Control/ Continuous Mandatory Ventilation)  RR (machine): 16  TV (machine): 400  FiO2: 40  PEEP: 5  ITime: 0.8  MAP: 9  PIP: 21        CAPILLARY BLOOD GLUCOSE      POCT Blood Glucose.: 190 mg/dL (20 Jan 2021 06:33)      PHYSICAL EXAM:  Constitutional: Sedated and intubated, on ventilator     Head:  normocephalic, atraumatic.   GI: normal bowel sounds  Cardio: S1,S2, normal HR  Lungs: bilateral breath sounds. minimal air leak after cuff deflated  Nose:  Septum intact, midline, deviated.  Inferior turbinates normal bilateral  OC/OP:  ETT in place. Tongue appears large and swollen, but markedly improved from yesterday on my exam. Firm to the touch. Floor of mouth with edema as well but soft when palpated.   Neck:  Submental area full but soft to palpation. Does not feel firm. Trachea midline.  Thyroid, parotid and submandibular glands normal.  Lymph:  No cervical adenopathy palpated.    LINES:    MEDICATIONS  (STANDING):  ampicillin/sulbactam  IVPB 3 Gram(s) IV Intermittent every 6 hours  chlorhexidine 0.12% Liquid 15 milliLiter(s) Oral Mucosa every 12 hours  chlorhexidine 4% Liquid 1 Application(s) Topical <User Schedule>  dexAMETHasone  Injectable 4 milliGRAM(s) IV Push every 6 hours  dexMEDEtomidine Infusion 0.5 MICROgram(s)/kG/Hr (12.6 mL/Hr) IV Continuous <Continuous>  dextrose 40% Gel 15 Gram(s) Oral once  dextrose 5%. 1000 milliLiter(s) (50 mL/Hr) IV Continuous <Continuous>  dextrose 5%. 1000 milliLiter(s) (100 mL/Hr) IV Continuous <Continuous>  dextrose 50% Injectable 25 Gram(s) IV Push once  dextrose 50% Injectable 12.5 Gram(s) IV Push once  dextrose 50% Injectable 25 Gram(s) IV Push once  diphenhydrAMINE   Injectable 50 milliGRAM(s) IV Push every 8 hours  enoxaparin Injectable 40 milliGRAM(s) SubCutaneous every 12 hours  famotidine Injectable 20 milliGRAM(s) IV Push every 12 hours  glucagon  Injectable 1 milliGRAM(s) IntraMuscular once  insulin lispro (ADMELOG) corrective regimen sliding scale   SubCutaneous every 6 hours  propofol Infusion 10 MICROgram(s)/kG/Min (6.04 mL/Hr) IV Continuous <Continuous>  tamoxifen 20 milliGRAM(s) Oral daily    MEDICATIONS  (PRN):      PRN Meds:      LABS:                                              12.5   18.53 )-----------( 280      ( 21 Jan 2021 01:33 )             37.8         Hgb Trend: 14.4<--    01-21    144  |  108<H>  |  18  ----------------------------<  218<H>  3.6   |  23  |  0.95    Ca    8.7      21 Jan 2021 01:33  Phos  2.2     01-21  Mg     2.2     01-21    TPro  6.0  /  Alb  3.5  /  TBili  <0.2  /  DBili  x   /  AST  11  /  ALT  8   /  AlkPhos  55  01-21        ABG - ( 19 Jan 2021 16:51 )  pH, Arterial: 7.37  pH, Blood: x     /  pCO2: 41    /  pO2: 83    / HCO3: 23    / Base Excess: -1.7  /  SaO2: 95.6                  MICROBIOLOGY:     RADIOLOGY:  [ ] Reviewed and interpreted by me    EKG:     Interval Events: No events overnight. Received versed push and precedex gtt started. Propofol weaned, and patient successfully extubated today.    REVIEW OF SYSTEMS:  Constitutional: [ ] negative [ ] fevers [ ] chills [ ] weight loss [ ] weight gain  HEENT: [ ] negative [ ] dry eyes [ ] eye irritation [ ] postnasal drip [ ] nasal congestion  CV: [ ] negative  [ ] chest pain [ ] orthopnea [ ] palpitations [ ] murmur  Resp: [ ] negative [ ] cough [ ] shortness of breath [ ] dyspnea [ ] wheezing [ ] sputum [ ] hemoptysis  GI: [ ] negative [ ] nausea [ ] vomiting [ ] diarrhea [ ] constipation [ ] abd pain [ ] dysphagia   : [ ] negative [ ] dysuria [ ] nocturia [ ] hematuria [ ] increased urinary frequency  Musculoskeletal: [ ] negative [ ] back pain [ ] myalgias [ ] arthralgias [ ] fracture  Skin: [ ] negative [ ] rash [ ] itch  Neurological: [ ] negative [ ] headache [ ] dizziness [ ] syncope [ ] weakness [ ] numbness  Psychiatric: [ ] negative [ ] anxiety [ ] depression  Endocrine: [ ] negative [ ] diabetes [ ] thyroid problem  Hematologic/Lymphatic: [ ] negative [ ] anemia [ ] bleeding problem  Allergic/Immunologic: [ ] negative [ ] itchy eyes [ ] nasal discharge [ ] hives [ ] angioedema  [ ] All other systems negative  [ x] Unable to assess ROS because sedated patient    OBJECTIVE:  ICU Vital Signs Last 24 Hrs  T(C): 37.6 (21 Jan 2021 04:00), Max: 37.6 (21 Jan 2021 04:00)  T(F): 99.6 (21 Jan 2021 04:00), Max: 99.6 (21 Jan 2021 04:00)  HR: 64 (21 Jan 2021 06:52) (62 - 85)  BP: 159/93 (21 Jan 2021 06:00) (100/62 - 159/93)  BP(mean): 112 (21 Jan 2021 06:00) (73 - 112)  ABP: --  ABP(mean): --  RR: 17 (21 Jan 2021 06:00) (16 - 25)  SpO2: 93% (21 Jan 2021 06:52) (92% - 97%)    Mode: AC/ CMV (Assist Control/ Continuous Mandatory Ventilation)  RR (machine): 16  TV (machine): 400  FiO2: 40  PEEP: 5  ITime: 0.8  MAP: 9  PIP: 21        CAPILLARY BLOOD GLUCOSE      POCT Blood Glucose.: 190 mg/dL (20 Jan 2021 06:33)      PHYSICAL EXAM:  Constitutional: Sedated and intubated, on ventilator     Head:  normocephalic, atraumatic.   GI: normal bowel sounds  Cardio: S1,S2, normal HR  Lungs: bilateral breath sounds. minimal air leak after cuff deflated  Nose:  Septum intact, midline, deviated.  Inferior turbinates normal bilateral  OC/OP:  ETT in place. Tongue appears large and swollen, but markedly improved from yesterday on my exam. Firm to the touch. Floor of mouth with edema as well but soft when palpated.   Neck:  Submental area full but soft to palpation. Does not feel firm. Trachea midline.  Thyroid, parotid and submandibular glands normal.  Lymph:  No cervical adenopathy palpated.    LINES:    MEDICATIONS  (STANDING):  ampicillin/sulbactam  IVPB 3 Gram(s) IV Intermittent every 6 hours  chlorhexidine 0.12% Liquid 15 milliLiter(s) Oral Mucosa every 12 hours  chlorhexidine 4% Liquid 1 Application(s) Topical <User Schedule>  dexAMETHasone  Injectable 4 milliGRAM(s) IV Push every 6 hours  dexMEDEtomidine Infusion 0.5 MICROgram(s)/kG/Hr (12.6 mL/Hr) IV Continuous <Continuous>  dextrose 40% Gel 15 Gram(s) Oral once  dextrose 5%. 1000 milliLiter(s) (50 mL/Hr) IV Continuous <Continuous>  dextrose 5%. 1000 milliLiter(s) (100 mL/Hr) IV Continuous <Continuous>  dextrose 50% Injectable 25 Gram(s) IV Push once  dextrose 50% Injectable 12.5 Gram(s) IV Push once  dextrose 50% Injectable 25 Gram(s) IV Push once  diphenhydrAMINE   Injectable 50 milliGRAM(s) IV Push every 8 hours  enoxaparin Injectable 40 milliGRAM(s) SubCutaneous every 12 hours  famotidine Injectable 20 milliGRAM(s) IV Push every 12 hours  glucagon  Injectable 1 milliGRAM(s) IntraMuscular once  insulin lispro (ADMELOG) corrective regimen sliding scale   SubCutaneous every 6 hours  propofol Infusion 10 MICROgram(s)/kG/Min (6.04 mL/Hr) IV Continuous <Continuous>  tamoxifen 20 milliGRAM(s) Oral daily    MEDICATIONS  (PRN):      PRN Meds:      LABS:                                              12.5   18.53 )-----------( 280      ( 21 Jan 2021 01:33 )             37.8         Hgb Trend: 14.4<--    01-21    144  |  108<H>  |  18  ----------------------------<  218<H>  3.6   |  23  |  0.95    Ca    8.7      21 Jan 2021 01:33  Phos  2.2     01-21  Mg     2.2     01-21    TPro  6.0  /  Alb  3.5  /  TBili  <0.2  /  DBili  x   /  AST  11  /  ALT  8   /  AlkPhos  55  01-21        ABG - ( 19 Jan 2021 16:51 )  pH, Arterial: 7.37  pH, Blood: x     /  pCO2: 41    /  pO2: 83    / HCO3: 23    / Base Excess: -1.7  /  SaO2: 95.6                  MICROBIOLOGY:     RADIOLOGY:  [ ] Reviewed and interpreted by me    EKG:

## 2021-01-21 NOTE — DIETITIAN INITIAL EVALUATION ADULT. - ADD RECOMMEND
Initiate PO diet when medically able , consult nutrition if indicated for EN support - Glucerna 1.5 @ 40 ml/hr x 24hrs , No Carb Pro source 3 packs daily

## 2021-01-22 DIAGNOSIS — C50.911 MALIGNANT NEOPLASM OF UNSPECIFIED SITE OF RIGHT FEMALE BREAST: ICD-10-CM

## 2021-01-22 DIAGNOSIS — I10 ESSENTIAL (PRIMARY) HYPERTENSION: ICD-10-CM

## 2021-01-22 DIAGNOSIS — E11.9 TYPE 2 DIABETES MELLITUS WITHOUT COMPLICATIONS: ICD-10-CM

## 2021-01-22 DIAGNOSIS — T78.3XXA ANGIONEUROTIC EDEMA, INITIAL ENCOUNTER: ICD-10-CM

## 2021-01-22 LAB
A1C WITH ESTIMATED AVERAGE GLUCOSE RESULT: 7.5 % — HIGH (ref 4–5.6)
ALBUMIN SERPL ELPH-MCNC: 3.7 G/DL — SIGNIFICANT CHANGE UP (ref 3.3–5)
ALP SERPL-CCNC: 58 U/L — SIGNIFICANT CHANGE UP (ref 40–120)
ALT FLD-CCNC: 10 U/L — SIGNIFICANT CHANGE UP (ref 4–33)
ANION GAP SERPL CALC-SCNC: 14 MMOL/L — SIGNIFICANT CHANGE UP (ref 7–14)
APTT BLD: 27 SEC — SIGNIFICANT CHANGE UP (ref 27–36.3)
AST SERPL-CCNC: 12 U/L — SIGNIFICANT CHANGE UP (ref 4–32)
BASOPHILS # BLD AUTO: 0.08 K/UL — SIGNIFICANT CHANGE UP (ref 0–0.2)
BASOPHILS NFR BLD AUTO: 0.4 % — SIGNIFICANT CHANGE UP (ref 0–2)
BILIRUB SERPL-MCNC: 0.2 MG/DL — SIGNIFICANT CHANGE UP (ref 0.2–1.2)
BUN SERPL-MCNC: 21 MG/DL — SIGNIFICANT CHANGE UP (ref 7–23)
CALCIUM SERPL-MCNC: 9 MG/DL — SIGNIFICANT CHANGE UP (ref 8.4–10.5)
CHLORIDE SERPL-SCNC: 105 MMOL/L — SIGNIFICANT CHANGE UP (ref 98–107)
CO2 SERPL-SCNC: 25 MMOL/L — SIGNIFICANT CHANGE UP (ref 22–31)
CREAT SERPL-MCNC: 0.73 MG/DL — SIGNIFICANT CHANGE UP (ref 0.5–1.3)
EOSINOPHIL # BLD AUTO: 0 K/UL — SIGNIFICANT CHANGE UP (ref 0–0.5)
EOSINOPHIL NFR BLD AUTO: 0 % — SIGNIFICANT CHANGE UP (ref 0–6)
ESTIMATED AVERAGE GLUCOSE: 169 MG/DL — HIGH (ref 68–114)
GLUCOSE BLDC GLUCOMTR-MCNC: 171 MG/DL — HIGH (ref 70–99)
GLUCOSE SERPL-MCNC: 170 MG/DL — HIGH (ref 70–99)
HCT VFR BLD CALC: 41.9 % — SIGNIFICANT CHANGE UP (ref 34.5–45)
HGB BLD-MCNC: 13.7 G/DL — SIGNIFICANT CHANGE UP (ref 11.5–15.5)
IANC: 15.09 K/UL — HIGH (ref 1.5–8.5)
IMM GRANULOCYTES NFR BLD AUTO: 2.9 % — HIGH (ref 0–1.5)
INR BLD: 1.08 RATIO — SIGNIFICANT CHANGE UP (ref 0.88–1.16)
LYMPHOCYTES # BLD AUTO: 10.9 % — LOW (ref 13–44)
LYMPHOCYTES # BLD AUTO: 2.06 K/UL — SIGNIFICANT CHANGE UP (ref 1–3.3)
MAGNESIUM SERPL-MCNC: 2 MG/DL — SIGNIFICANT CHANGE UP (ref 1.6–2.6)
MCHC RBC-ENTMCNC: 30.2 PG — SIGNIFICANT CHANGE UP (ref 27–34)
MCHC RBC-ENTMCNC: 32.7 GM/DL — SIGNIFICANT CHANGE UP (ref 32–36)
MCV RBC AUTO: 92.3 FL — SIGNIFICANT CHANGE UP (ref 80–100)
MONOCYTES # BLD AUTO: 1.09 K/UL — HIGH (ref 0–0.9)
MONOCYTES NFR BLD AUTO: 5.8 % — SIGNIFICANT CHANGE UP (ref 2–14)
NEUTROPHILS # BLD AUTO: 15.09 K/UL — HIGH (ref 1.8–7.4)
NEUTROPHILS NFR BLD AUTO: 80 % — HIGH (ref 43–77)
NRBC # BLD: 0 /100 WBCS — SIGNIFICANT CHANGE UP
NRBC # FLD: 0 K/UL — SIGNIFICANT CHANGE UP
PHOSPHATE SERPL-MCNC: 2 MG/DL — LOW (ref 2.5–4.5)
PLATELET # BLD AUTO: 278 K/UL — SIGNIFICANT CHANGE UP (ref 150–400)
POTASSIUM SERPL-MCNC: 3.6 MMOL/L — SIGNIFICANT CHANGE UP (ref 3.5–5.3)
POTASSIUM SERPL-SCNC: 3.6 MMOL/L — SIGNIFICANT CHANGE UP (ref 3.5–5.3)
PROT SERPL-MCNC: 6.6 G/DL — SIGNIFICANT CHANGE UP (ref 6–8.3)
PROTHROM AB SERPL-ACNC: 12.4 SEC — SIGNIFICANT CHANGE UP (ref 10.6–13.6)
RBC # BLD: 4.54 M/UL — SIGNIFICANT CHANGE UP (ref 3.8–5.2)
RBC # FLD: 13.5 % — SIGNIFICANT CHANGE UP (ref 10.3–14.5)
SARS-COV-2 IGG SERPL QL IA: POSITIVE
SARS-COV-2 IGM SERPL IA-ACNC: 2.53 INDEX — HIGH
SODIUM SERPL-SCNC: 144 MMOL/L — SIGNIFICANT CHANGE UP (ref 135–145)
WBC # BLD: 18.87 K/UL — HIGH (ref 3.8–10.5)
WBC # FLD AUTO: 18.87 K/UL — HIGH (ref 3.8–10.5)

## 2021-01-22 PROCEDURE — 99233 SBSQ HOSP IP/OBS HIGH 50: CPT | Mod: GC

## 2021-01-22 RX ORDER — FAMOTIDINE 10 MG/ML
20 INJECTION INTRAVENOUS
Refills: 0 | Status: DISCONTINUED | OUTPATIENT
Start: 2021-01-23 | End: 2021-01-23

## 2021-01-22 RX ORDER — DIPHENHYDRAMINE HCL 50 MG
50 CAPSULE ORAL DAILY
Refills: 0 | Status: DISCONTINUED | OUTPATIENT
Start: 2021-01-23 | End: 2021-01-23

## 2021-01-22 RX ORDER — POTASSIUM PHOSPHATE, MONOBASIC POTASSIUM PHOSPHATE, DIBASIC 236; 224 MG/ML; MG/ML
15 INJECTION, SOLUTION INTRAVENOUS ONCE
Refills: 0 | Status: COMPLETED | OUTPATIENT
Start: 2021-01-22 | End: 2021-01-22

## 2021-01-22 RX ORDER — AMLODIPINE BESYLATE 2.5 MG/1
5 TABLET ORAL DAILY
Refills: 0 | Status: DISCONTINUED | OUTPATIENT
Start: 2021-01-22 | End: 2021-01-23

## 2021-01-22 RX ORDER — TAMOXIFEN CITRATE 20 MG/1
20 TABLET, FILM COATED ORAL DAILY
Refills: 0 | Status: DISCONTINUED | OUTPATIENT
Start: 2021-01-22 | End: 2021-01-23

## 2021-01-22 RX ADMIN — AMPICILLIN SODIUM AND SULBACTAM SODIUM 200 GRAM(S): 250; 125 INJECTION, POWDER, FOR SUSPENSION INTRAMUSCULAR; INTRAVENOUS at 23:42

## 2021-01-22 RX ADMIN — ENOXAPARIN SODIUM 40 MILLIGRAM(S): 100 INJECTION SUBCUTANEOUS at 05:48

## 2021-01-22 RX ADMIN — POTASSIUM PHOSPHATE, MONOBASIC POTASSIUM PHOSPHATE, DIBASIC 62.5 MILLIMOLE(S): 236; 224 INJECTION, SOLUTION INTRAVENOUS at 09:59

## 2021-01-22 RX ADMIN — Medication 1: at 09:58

## 2021-01-22 RX ADMIN — AMPICILLIN SODIUM AND SULBACTAM SODIUM 200 GRAM(S): 250; 125 INJECTION, POWDER, FOR SUSPENSION INTRAMUSCULAR; INTRAVENOUS at 12:54

## 2021-01-22 RX ADMIN — Medication 4 MILLIGRAM(S): at 22:43

## 2021-01-22 RX ADMIN — AMPICILLIN SODIUM AND SULBACTAM SODIUM 200 GRAM(S): 250; 125 INJECTION, POWDER, FOR SUSPENSION INTRAMUSCULAR; INTRAVENOUS at 05:47

## 2021-01-22 RX ADMIN — Medication 50 MILLIGRAM(S): at 12:54

## 2021-01-22 RX ADMIN — AMPICILLIN SODIUM AND SULBACTAM SODIUM 200 GRAM(S): 250; 125 INJECTION, POWDER, FOR SUSPENSION INTRAMUSCULAR; INTRAVENOUS at 16:30

## 2021-01-22 RX ADMIN — Medication 1: at 13:35

## 2021-01-22 RX ADMIN — Medication 4 MILLIGRAM(S): at 05:48

## 2021-01-22 RX ADMIN — FAMOTIDINE 20 MILLIGRAM(S): 10 INJECTION INTRAVENOUS at 18:13

## 2021-01-22 RX ADMIN — ENOXAPARIN SODIUM 40 MILLIGRAM(S): 100 INJECTION SUBCUTANEOUS at 16:31

## 2021-01-22 RX ADMIN — Medication 4 MILLIGRAM(S): at 12:54

## 2021-01-22 RX ADMIN — AMLODIPINE BESYLATE 5 MILLIGRAM(S): 2.5 TABLET ORAL at 16:31

## 2021-01-22 RX ADMIN — Medication 1: at 18:13

## 2021-01-22 RX ADMIN — Medication 50 MILLIGRAM(S): at 22:43

## 2021-01-22 RX ADMIN — FAMOTIDINE 20 MILLIGRAM(S): 10 INJECTION INTRAVENOUS at 05:49

## 2021-01-22 RX ADMIN — TAMOXIFEN CITRATE 20 MILLIGRAM(S): 20 TABLET, FILM COATED ORAL at 14:50

## 2021-01-22 RX ADMIN — Medication 50 MILLIGRAM(S): at 05:48

## 2021-01-22 RX ADMIN — INSULIN GLARGINE 3 UNIT(S): 100 INJECTION, SOLUTION SUBCUTANEOUS at 22:43

## 2021-01-22 NOTE — PROGRESS NOTE ADULT - PROBLEM SELECTOR PLAN 1
- Received some steroids at OSH ED, started on zosyn now on unasyn  - OMFS: - No acute intervention, teeth clinically and radiographically without decay, CT findings highly inconsistent with odontogenic source of infection. C/W ABx  - ENT: Recommend angioedema cocktail (Benadryl 50mg IV q8h, Famotidine 20mg IV q12h, Dexamethasone decreased to 4mg q6h), update chart to reflect possible allergy to enalapril   - Zosyn for infection (1/19-) switched to unasyn (1/20-), will continue for 2 to 3 weeks per guidelines. Can utilize CRP as marker of treatment success  - Extubated 1/21, satting high 90's on 4L NC, no evidence of stridor  - No cultures (strep/throat or blood drawn so far) would continue to monitor. - Patient without fevers, systemic symptoms prior to intubation. leukocytosis likely 2/2 high doses IV steroids. If febrile, or not improving on exam, culture - Received some steroids at OSH ED, started on zosyn now on unasyn  - OMFS: - No acute intervention, teeth clinically and radiographically without decay, CT findings highly inconsistent with odontogenic source of infection. C/W ABx  - ENT: Recommend angioedema cocktail (Benadryl 50mg IV q8h, Famotidine 20mg IV q12h, Dexamethasone decreased to 4mg q6h)  - Update chart to reflect possible allergy to enalapril   - Zosyn for infection (1/19-) switched to unasyn (1/20-), will continue for 2 to 3 weeks per guidelines. Can utilize CRP as marker of treatment success  - Extubated 1/21, satting high 90's on 4L NC, no evidence of stridor  - No cultures (strep/throat or blood drawn so far) would continue to monitor. - Patient without fevers, systemic symptoms prior to intubation. leukocytosis likely 2/2 high doses IV steroids. If febrile, or not improving on exam, culture

## 2021-01-22 NOTE — SWALLOW BEDSIDE ASSESSMENT ADULT - SWALLOW EVAL: RECOMMENDED FEEDING/EATING TECHNIQUES
allow for swallow between intakes/maintain upright posture during/after eating for 30 mins/no straws/oral hygiene/position upright (90 degrees)/small sips/bites

## 2021-01-22 NOTE — CHART NOTE - NSCHARTNOTEFT_GEN_A_CORE
Brief History:  50 year old female with PMH of HTN, DM, breast cancer diagnosed 2019, (on tamoxifen) who initially came to [place] ED complaining of swelling and pain to the tongue. Pt states no new meds and no new soaps/detergents/contact with anything. Mentions the swelling started Tuesday and she was sent home from work. Over the weekend the symptoms somewhat improved. However overnight from monday to tuesday, she noticed tongue swelling with trouble talking. She denies any other symptoms. She had no temperature at home, did not feel generally ill or weak. He only complaints are those localized to the pain and swelling of mouth/throat. She denies shortness of breath, fevers, chills. Of note patient is on Enalapril for HTN and has been for years. ADDITIONALLY, last year she was notes after she was started on tamoxifen in 2019, she had an episode of facial swelling which resolved.  She denies no new foods or environmental exposures that she knows of. Denies hx of drug or environmental allergies. She has worked as a  for 13 years.     Patient was seen by ENT and OMFS both who recommended continuing steroids and IV antibiotics. Patient had great improvement in swelling over two days. Noted on day 1 with no air leak after deflating ETT cuff. On day 2 patient had exceptional improvement and was weaned from sedation. She was extubated to BIPAP then weaned to NC with good effect.     ICU Vital Signs Last 24 Hrs  T(C): 37.4 (22 Jan 2021 00:56), Max: 37.6 (21 Jan 2021 04:00)  T(F): 99.3 (22 Jan 2021 00:56), Max: 99.6 (21 Jan 2021 04:00)  HR: 92 (22 Jan 2021 00:56) (58 - 92)  BP: 143/73 (22 Jan 2021 00:56) (87/44 - 165/74)  BP(mean): 88 (22 Jan 2021 00:00) (58 - 112)  ABP: --  ABP(mean): --  RR: 19 (22 Jan 2021 00:56) (15 - 22)  SpO2: 96% (22 Jan 2021 00:56) (92% - 98%)        ASSESSMENT & PLAN:   50 year old female with PMH of HTN, DM who initially came to [place] ED complaining of swelling and pain to the tongue concerning for infection vs. nichelle angina vs. unspecified edema.    Neuro:   - Intubated adequately on precedex and propofol drips    CV:   - History of HTN, currently BP stable.   - Hemodynamically stable at this time, MAP>65 off pressors    Respiratory:   #Upper airway obstruction 2/2 nichelle angina vs. angioedema vs. allergy  - Received some steroids at [facility] ED, started on zosyn now on unasyn  - OMFS: - No acute OMFS intervention, teeth clinically and radiographically without decay, CT findings highly inconsistent with odontogenic source of infection. C/W ABx  - ENT: Recommend angioedema cocktail (Benadryl 50mg IV q8h, Famotidine 20mg IV q12h, Dexamethasone decreased to 4mg q6h), update chart to reflect possible allergy to enalapril   - Zosyn for infection (1/19-) switched to unasyn (1/20-), will continue for 2 to 3 weeks per guidelines. Can utilize CRP as marker of treatment success  - Extubated 1/21 to BIPAP, satting high 90's, no evidence of stridor.      Endo:   DM- Diabetic on metformin at home.   - ISS q6  - Check A1c in AM.  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 angioedema vs. allergy  - F/U OMFS, ENT recommendations as above  - No cultures (strep/throat or blood drawn so far) would continue to monitor. Patient without fevers, systemic symptoms prior to intubation. leukocytosis likely 2/2 high doses IV steroids. If febrile, or not improving on exam, culture.     Renal:   Normal Kidney Function, trend I+Os      For Followup:    [ ] Monitor airway   [ ] Wean steroids   [ ] On reduced dose Lantus 3U at bedtime with MALIA 2/2 anticipated decreased PO; adjust per requirement  [ ] Continue antibiotics (unasyn) for at least 2 weeks for nichelle's angina/tonsillitis  [ ] F/U ENT/OMFS recs

## 2021-01-22 NOTE — CHART NOTE - NSCHARTNOTESELECT_GEN_ALL_CORE
MAR acceptance note/Event Note
MICU Transfer Note/Transfer Note
PROCEDURE NOTE/Event Note
PROCEDURE NOTE/Event Note

## 2021-01-22 NOTE — SWALLOW BEDSIDE ASSESSMENT ADULT - ASR SWALLOW LINGUAL MOBILITY
Nevin Monroy is a 70 year old female here for  Chief Complaint   Patient presents with   • Cancer      CLL     Denies latex allergy or sensitivity.    Medication verified, no changes.  PCP and Pharmacy verified.    Social History     Tobacco Use   Smoking Status Never Smoker   Smokeless Tobacco Never Used     Advance Directives Filed: Yes    ECO - Ambulatory/capable of self-care, unable to perform work activities.  Up & about more than 50% of the day.    Height: No.  Ht Readings from Last 1 Encounters:   10/25/19 4' 10\" (1.473 m)     Weight:Yes, shoes on.  Wt Readings from Last 3 Encounters:   10/25/19 62.6 kg   19 63.2 kg   19 63 kg       BMI: There is no height or weight on file to calculate BMI.    REVIEW OF SYSTEMS  GENERAL:  Patient denies headache, fevers, chills, night sweats, excessive fatigue, change in appetite, weight loss, dizziness  ALLERGIC/IMMUNOLOGIC: Verified allergies: Yes  EYES:  Patient denies significant visual difficulties, double vision, blurred vision  ENT/MOUTH: Patient denies problems with hearing, sore throat, sinus drainage, mouth sores  ENDOCRINE:  Patient denies diabetes, thyroid disease, hormone replacement, hot flashes  HEMATOLOGIC/LYMPHATIC: Patient denies easy bruising, bleeding, tender lymph nodes, swollen lymph nodes  BREASTS: Patient denies abnormal masses of breast, nipple discharge, pain  RESPIRATORY:  Patient denies lung pain with breathing, cough, coughing up blood, shortness of breath  CARDIOVASCULAR:  Patient denies anginal chest pain, palpitations, shortness of breath when lying flat, peripheral edema  GASTROINTESTINAL: Patient denies abdominal pain , nausea, vomiting, diarrhea, GI bleeding, constipation, change in bowel habits, heartburn, sensation of feeling full, difficulty swallowing  : Patient denies abnormal genital masses, blood in the urine, frequency, urgency, burning with urination, hesitancy, incontinence, vaginal bleeding,  discharge  MUSCULOSKELETAL:  Patient denies joint pain, bone pain, joint swelling, redness, decreased range of motion  SKIN:  Patient denies chronic rashes, inflammation, ulcerations, skin changes, itching  NEUROLOGIC:  Patient denies loss of balance, areas of focal weakness, abnormal gait, sensory problems, numbness, tingling  PSYCHIATRIC: Patient denies insomnia, depression, anxiety   within functional limits

## 2021-01-22 NOTE — PROGRESS NOTE ADULT - SUBJECTIVE AND OBJECTIVE BOX
Patient is a 50y old  Female who presents with a chief complaint of swelling of throat, mouth (21 Jan 2021 15:00)      Vance Beach PGY1  Anesthesiology  Pager: [facility]: [phone] NS: [phone]    SUBJECTIVE/OVERNIGHT EVENTS     MEDICATIONS  (STANDING):  ampicillin/sulbactam  IVPB 3 Gram(s) IV Intermittent every 6 hours  dexAMETHasone  Injectable 4 milliGRAM(s) IV Push every 8 hours  dextrose 40% Gel 15 Gram(s) Oral once  dextrose 5%. 1000 milliLiter(s) (50 mL/Hr) IV Continuous <Continuous>  dextrose 5%. 1000 milliLiter(s) (100 mL/Hr) IV Continuous <Continuous>  dextrose 50% Injectable 25 Gram(s) IV Push once  dextrose 50% Injectable 25 Gram(s) IV Push once  dextrose 50% Injectable 12.5 Gram(s) IV Push once  diphenhydrAMINE   Injectable 50 milliGRAM(s) IV Push every 8 hours  enoxaparin Injectable 40 milliGRAM(s) SubCutaneous every 12 hours  famotidine Injectable 20 milliGRAM(s) IV Push every 12 hours  glucagon  Injectable 1 milliGRAM(s) IntraMuscular once  insulin glargine Injectable (LANTUS) 3 Unit(s) SubCutaneous at bedtime  insulin lispro (ADMELOG) corrective regimen sliding scale   SubCutaneous three times a day before meals  insulin lispro (ADMELOG) corrective regimen sliding scale   SubCutaneous at bedtime  tamoxifen 20 milliGRAM(s) Oral daily    MEDICATIONS  (PRN):    CAPILLARY BLOOD GLUCOSE      POCT Blood Glucose.: 188 mg/dL (21 Jan 2021 22:05)  POCT Blood Glucose.: 174 mg/dL (21 Jan 2021 17:05)  POCT Blood Glucose.: 250 mg/dL (21 Jan 2021 11:37)    I&O's Summary    21 Jan 2021 07:01  -  22 Jan 2021 07:00  --------------------------------------------------------  IN: 241.8 mL / OUT: 1235 mL / NET: -993.2 mL          Vital Signs Last 24 Hrs  T(C): 37.4 (22 Jan 2021 05:42), Max: 37.4 (22 Jan 2021 00:56)  T(F): 99.3 (22 Jan 2021 05:42), Max: 99.3 (22 Jan 2021 00:56)  HR: 84 (22 Jan 2021 07:40) (58 - 92)  BP: 133/78 (22 Jan 2021 05:42) (87/44 - 165/74)  BP(mean): 88 (22 Jan 2021 00:00) (58 - 103)  RR: 19 (22 Jan 2021 05:42) (15 - 22)  SpO2: 95% (22 Jan 2021 07:40) (92% - 98%)    PHYSICAL EXAM:  GENERAL: NAD, well-developed  HEAD:  Atraumatic, Normocephalic  EYES: EOMI, PERRLA, conjunctiva and sclera clear  NECK: Supple, No JVD  CHEST/LUNG: Clear to auscultation bilaterally; No wheeze  HEART: Regular rate and rhythm; No murmurs, rubs, or gallops  ABDOMEN: Soft, Nontender, Nondistended; Bowel sounds present  EXTREMITIES:  2+ Peripheral Pulses, No clubbing, cyanosis, or edema  PSYCH: AAOx3  NEUROLOGY: non-focal  SKIN: No rashes or lesions    LABS                        13.7   18.87 )-----------( 278      ( 22 Jan 2021 07:19 )             41.9                         12.5   18.53 )-----------( 280      ( 21 Jan 2021 01:33 )             37.8     01-21    144  |  108<H>  |  18  ----------------------------<  218<H>  3.6   |  23  |  0.95    Ca    8.7      21 Jan 2021 01:33  Phos  2.2     01-21  Mg     2.2     01-21    TPro  6.0  /  Alb  3.5  /  TBili  <0.2  /  DBili  x   /  AST  11  /  ALT  8   /  AlkPhos  55  01-21              ( 19 Jan 2021 16:51 ) pH: 7.37  /  pCO2: 41    /  pO2: 83    / HCO3: 23    / Base Excess: -1.7  /  SaO2: 95.6                  RADIOLOGY & ADDITIONAL TESTS:         Patient is a 50y old  Female who presents with a chief complaint of swelling of throat, mouth (21 Jan 2021 15:00)      Vance Beach PGY1  Anesthesiology  Pager: [facility]: [phone] NS: [phone]    SUBJECTIVE/OVERNIGHT EVENTS: NAEO. Pt seen and examined. Notes no difficulty breathing this AM. States she feels well and would like to eat. Denies any throat pain, SOB, cough, CP, fever, chills.     MEDICATIONS  (STANDING):  ampicillin/sulbactam  IVPB 3 Gram(s) IV Intermittent every 6 hours  dexAMETHasone  Injectable 4 milliGRAM(s) IV Push every 8 hours  dextrose 40% Gel 15 Gram(s) Oral once  dextrose 5%. 1000 milliLiter(s) (50 mL/Hr) IV Continuous <Continuous>  dextrose 5%. 1000 milliLiter(s) (100 mL/Hr) IV Continuous <Continuous>  dextrose 50% Injectable 25 Gram(s) IV Push once  dextrose 50% Injectable 25 Gram(s) IV Push once  dextrose 50% Injectable 12.5 Gram(s) IV Push once  diphenhydrAMINE   Injectable 50 milliGRAM(s) IV Push every 8 hours  enoxaparin Injectable 40 milliGRAM(s) SubCutaneous every 12 hours  famotidine Injectable 20 milliGRAM(s) IV Push every 12 hours  glucagon  Injectable 1 milliGRAM(s) IntraMuscular once  insulin glargine Injectable (LANTUS) 3 Unit(s) SubCutaneous at bedtime  insulin lispro (ADMELOG) corrective regimen sliding scale   SubCutaneous three times a day before meals  insulin lispro (ADMELOG) corrective regimen sliding scale   SubCutaneous at bedtime  tamoxifen 20 milliGRAM(s) Oral daily    MEDICATIONS  (PRN):    CAPILLARY BLOOD GLUCOSE      POCT Blood Glucose.: 188 mg/dL (21 Jan 2021 22:05)  POCT Blood Glucose.: 174 mg/dL (21 Jan 2021 17:05)  POCT Blood Glucose.: 250 mg/dL (21 Jan 2021 11:37)    I&O's Summary    21 Jan 2021 07:01  -  22 Jan 2021 07:00  --------------------------------------------------------  IN: 241.8 mL / OUT: 1235 mL / NET: -993.2 mL          Vital Signs Last 24 Hrs  T(C): 37.4 (22 Jan 2021 05:42), Max: 37.4 (22 Jan 2021 00:56)  T(F): 99.3 (22 Jan 2021 05:42), Max: 99.3 (22 Jan 2021 00:56)  HR: 84 (22 Jan 2021 07:40) (58 - 92)  BP: 133/78 (22 Jan 2021 05:42) (87/44 - 165/74)  BP(mean): 88 (22 Jan 2021 00:00) (58 - 103)  RR: 19 (22 Jan 2021 05:42) (15 - 22)  SpO2: 95% (22 Jan 2021 07:40) (92% - 98%)    PHYSICAL EXAM:  GENERAL: NAD, well-developed  EYES: EOMI, PERRLA  CHEST/LUNG: no 4L NC. Clear to auscultation bilaterally; No wheeze  HEART: Regular rate and rhythm; No murmurs  ABDOMEN: Soft, Nontender, Nondistended; Bowel sounds present  EXTREMITIES:  2+ Peripheral Pulses, No clubbing, cyanosis, or edema  PSYCH: AAOx3  NEUROLOGY: non-focal  SKIN: No rashes or lesions    LABS                        13.7   18.87 )-----------( 278      ( 22 Jan 2021 07:19 )             41.9                         12.5   18.53 )-----------( 280      ( 21 Jan 2021 01:33 )             37.8     01-21    144  |  108<H>  |  18  ----------------------------<  218<H>  3.6   |  23  |  0.95    Ca    8.7      21 Jan 2021 01:33  Phos  2.2     01-21  Mg     2.2     01-21    TPro  6.0  /  Alb  3.5  /  TBili  <0.2  /  DBili  x   /  AST  11  /  ALT  8   /  AlkPhos  55  01-21              ( 19 Jan 2021 16:51 ) pH: 7.37  /  pCO2: 41    /  pO2: 83    / HCO3: 23    / Base Excess: -1.7  /  SaO2: 95.6                  RADIOLOGY & ADDITIONAL TESTS:

## 2021-01-22 NOTE — PROGRESS NOTE ADULT - PROBLEM SELECTOR PLAN 3
Breast Cancer on tamoxifen since 2019  - Continue tamoxifen through OG tube Breast Cancer on tamoxifen since 2019  - Continue tamoxifen

## 2021-01-22 NOTE — SWALLOW BEDSIDE ASSESSMENT ADULT - SWALLOW EVAL: DIAGNOSIS
Patient demonstrated functional oral and pharyngeal stages of swallowing characterized by adequate oral prep with adequate bolus collection, manipulation and transfer, adequate initiatio of the pharyngeal trigger, palpable laryngeal elevation and no overt, clinical s/s of laryngeal penetration/aspiration across PO trials of regular solids, puree and thin liquids.

## 2021-01-22 NOTE — SWALLOW BEDSIDE ASSESSMENT ADULT - COMMENTS
Per chartin year old female with PMH of HTN, DM who initially came to [place] ED complaining of swelling and pain to the tongue concerning for infection vs. nichelle angina vs. unspecified edema s/p intubation and extubated     ENT 21: Flexible Fiberoptic Laryngoscopy: clear nasopharynx to glottis, +ETT passing through glottis, mild supraglottic edema, no pooling of secretions    OMFS 21: - No acute OMFS intervention - teeth clinically and radiographically without decay - CT findings highly inconsistent with odontogenic source of infection - care per MICU and ENT - re-consult OMFS as needed    CT Neck 21: The tongue appears enlarged without evidence of focal mass. There is asymmetric enlargement of the left palatine tonsil with surrounding fat stranding suggesting tonsillitis. There is edema along the left oropharyngeal wall and in the floor of mouth and left submandibular space, suggesting cellulitis. No evidence of rim-enhancing fluid collection.    Patient was received awake, alert and cooperative this AM. Sitting upright on edge of bed and consuming soft foods / thin liquids from breakfast tray with no overt difficulty noted. Supplemental O2 via NC in place. Upon clinician questioning, patient reported ongoing improvements in swallowing, breathing and voice quality.     "[employer] Interpreters #[id number] ([place]) was utilized for today's assessment as patient is primarily [place]an speaking.

## 2021-01-22 NOTE — PROGRESS NOTE ADULT - ASSESSMENT
50 year old female with PMH of HTN, DM who initially came to [place] ED complaining of swelling and pain to the tongue concerning for infection vs. nichelle angina vs. unspecified edema s/p intubation and extubated 1/21

## 2021-01-23 ENCOUNTER — TRANSCRIPTION ENCOUNTER (OUTPATIENT)
Age: 51
End: 2021-01-23

## 2021-01-23 VITALS
HEART RATE: 85 BPM | TEMPERATURE: 99 F | SYSTOLIC BLOOD PRESSURE: 142 MMHG | DIASTOLIC BLOOD PRESSURE: 87 MMHG | OXYGEN SATURATION: 94 % | RESPIRATION RATE: 16 BRPM

## 2021-01-23 PROCEDURE — 99239 HOSP IP/OBS DSCHRG MGMT >30: CPT | Mod: GC

## 2021-01-23 RX ORDER — DIPHENHYDRAMINE HCL 50 MG
2 CAPSULE ORAL
Qty: 42 | Refills: 0
Start: 2021-01-23 | End: 2021-01-29

## 2021-01-23 RX ORDER — AMLODIPINE BESYLATE 2.5 MG/1
1 TABLET ORAL
Qty: 30 | Refills: 2
Start: 2021-01-23 | End: 2021-04-22

## 2021-01-23 RX ORDER — FAMOTIDINE 10 MG/ML
1 INJECTION INTRAVENOUS
Qty: 60 | Refills: 1
Start: 2021-01-23 | End: 2021-03-23

## 2021-01-23 RX ORDER — METFORMIN HYDROCHLORIDE 850 MG/1
1 TABLET ORAL
Qty: 0 | Refills: 0 | DISCHARGE

## 2021-01-23 RX ORDER — ATENOLOL 25 MG/1
1 TABLET ORAL
Qty: 0 | Refills: 0 | DISCHARGE

## 2021-01-23 RX ORDER — TRIAMTERENE/HYDROCHLOROTHIAZID 75 MG-50MG
1 TABLET ORAL
Qty: 0 | Refills: 0 | DISCHARGE

## 2021-01-23 RX ORDER — METFORMIN HYDROCHLORIDE 850 MG/1
500 TABLET ORAL
Qty: 0 | Refills: 0 | DISCHARGE

## 2021-01-23 RX ADMIN — Medication 50 MILLIGRAM(S): at 13:15

## 2021-01-23 RX ADMIN — ENOXAPARIN SODIUM 40 MILLIGRAM(S): 100 INJECTION SUBCUTANEOUS at 04:51

## 2021-01-23 RX ADMIN — AMPICILLIN SODIUM AND SULBACTAM SODIUM 200 GRAM(S): 250; 125 INJECTION, POWDER, FOR SUSPENSION INTRAMUSCULAR; INTRAVENOUS at 04:51

## 2021-01-23 RX ADMIN — FAMOTIDINE 20 MILLIGRAM(S): 10 INJECTION INTRAVENOUS at 04:51

## 2021-01-23 RX ADMIN — TAMOXIFEN CITRATE 20 MILLIGRAM(S): 20 TABLET, FILM COATED ORAL at 13:15

## 2021-01-23 RX ADMIN — AMLODIPINE BESYLATE 5 MILLIGRAM(S): 2.5 TABLET ORAL at 04:51

## 2021-01-23 NOTE — PROGRESS NOTE ADULT - SUBJECTIVE AND OBJECTIVE BOX
Patient is a 50y old  Female who presents with a chief complaint of swelling of throat, mouth (22 Jan 2021 07:50)      Vance Beach PGY1  Anesthesiology  Pager: [facility]: [phone] NS: [phone]    SUBJECTIVE/OVERNIGHT EVENTS: Pt seen and examined. Notes no difficulty breathing this AM on room air. States she feels well and is tolerating her diet. Denies any throat pain, SOB, cough, CP, fever, chills.     MEDICATIONS  (STANDING):  amLODIPine   Tablet 5 milliGRAM(s) Oral daily  dextrose 40% Gel 15 Gram(s) Oral once  dextrose 5%. 1000 milliLiter(s) (50 mL/Hr) IV Continuous <Continuous>  dextrose 5%. 1000 milliLiter(s) (100 mL/Hr) IV Continuous <Continuous>  dextrose 50% Injectable 25 Gram(s) IV Push once  dextrose 50% Injectable 12.5 Gram(s) IV Push once  dextrose 50% Injectable 25 Gram(s) IV Push once  diphenhydrAMINE 50 milliGRAM(s) Oral daily  enoxaparin Injectable 40 milliGRAM(s) SubCutaneous every 12 hours  famotidine    Tablet 20 milliGRAM(s) Oral two times a day  glucagon  Injectable 1 milliGRAM(s) IntraMuscular once  insulin glargine Injectable (LANTUS) 3 Unit(s) SubCutaneous at bedtime  insulin lispro (ADMELOG) corrective regimen sliding scale   SubCutaneous three times a day before meals  insulin lispro (ADMELOG) corrective regimen sliding scale   SubCutaneous at bedtime  tamoxifen 20 milliGRAM(s) Oral daily    MEDICATIONS  (PRN):    CAPILLARY BLOOD GLUCOSE      POCT Blood Glucose.: 178 mg/dL (22 Jan 2021 22:15)  POCT Blood Glucose.: 187 mg/dL (22 Jan 2021 17:44)  POCT Blood Glucose.: 186 mg/dL (22 Jan 2021 13:33)  POCT Blood Glucose.: 171 mg/dL (22 Jan 2021 09:10)    I&O's Summary        Vital Signs Last 24 Hrs  T(C): 37.2 (23 Jan 2021 04:48), Max: 37.3 (22 Jan 2021 09:57)  T(F): 99 (23 Jan 2021 04:48), Max: 99.2 (22 Jan 2021 09:57)  HR: 75 (23 Jan 2021 04:48) (75 - 91)  BP: 131/88 (23 Jan 2021 04:48) (131/88 - 162/89)  BP(mean): --  RR: 17 (23 Jan 2021 04:48) (17 - 18)  SpO2: 96% (23 Jan 2021 04:48) (95% - 100%)    PHYSICAL EXAM:  GENERAL: NAD, well-developed  EYES: EOMI, PERRLA  CHEST/LUNG: Clear to auscultation bilaterally; No wheeze. On RA  HEART: Regular rate and rhythm; No murmurs  ABDOMEN: Soft, Nontender, Nondistended; Bowel sounds present  EXTREMITIES:  2+ Peripheral Pulses, No clubbing, cyanosis, or edema  PSYCH: AAOx3  NEUROLOGY: non-focal  SKIN: No rashes or lesions    LABS                        13.7   18.87 )-----------( 278      ( 22 Jan 2021 07:19 )             41.9     01-22    144  |  105  |  21  ----------------------------<  170<H>  3.6   |  25  |  0.73    Ca    9.0      22 Jan 2021 07:42  Phos  2.0     01-22  Mg     2.0     01-22    TPro  6.6  /  Alb  3.7  /  TBili  0.2  /  DBili  x   /  AST  12  /  ALT  10  /  AlkPhos  58  01-22    PT/INR - ( 22 Jan 2021 07:22 )   PT: 12.4 sec;   INR: 1.08 ratio         PTT - ( 22 Jan 2021 07:22 )  PTT:27.0 sec          ( 19 Jan 2021 16:51 ) pH: 7.37  /  pCO2: 41    /  pO2: 83    / HCO3: 23    / Base Excess: -1.7  /  SaO2: 95.6                  RADIOLOGY & ADDITIONAL TESTS:

## 2021-01-23 NOTE — PROGRESS NOTE ADULT - REASON FOR ADMISSION
swelling of throat, mouth

## 2021-01-23 NOTE — DISCHARGE NOTE PROVIDER - HOSPITAL COURSE
50 year old female with PMH of HTN, DM, breast cancer diagnosed 2019, (on tamoxifen) who initially came to [place] ED complaining of swelling and pain to the tongue. Pt states no new meds and no new soaps/detergents/contact with anything. Mentions the swelling started Tuesday and she was sent home from work. Over the weekend the symptoms somewhat improved. However overnight from monday to tuesday, she noticed tongue swelling with trouble talking. She denies any other symptoms. She had no temperature at home, did not feel generally ill or weak. He only complaints are those localized to the pain and swelling of mouth/throat. She denies shortness of breath, fevers, chills. Of note patient is on Enalapril for HTN and has been for years. ADDITIONALLY, last year she was notes after she was started on tamoxifen in 2019, she had an episode of facial swelling which resolved.  She denies no new foods or environmental exposures that she knows of. Denies hx of drug or environmental allergies. She has worked as a  for 13 years.     Patient was intubated on presentation described as having swollen airway imaging showed enlarged tongue without focal swelling and assymetric enlargement of palatine tonsil. Patient was also started on Abx for concern of Ludwigs angina. Subsequently seen by ENT who recommend angioedema cocktail (Benadryl 50mg IV q8h, Famotidine 20mg IV q12h, Dexamethasone decreased to 4mg q6h)and OMFS who stated that teeth clinically and radiographically without decay, CT findings highly inconsistent with odontogenic source of infection and recommended no acute intervention. Patient had great improvement in swelling over two days. Noted on day 1 with no air leak after deflating ETT cuff. On day 2 patient had exceptional improvement and was weaned from sedation. She was extubated to BIPAP then weaned to NC with good effect. Pt transferred to the floors and weaned from 4LNC to room air. Seen by S&S who recommended regular diet with thin liquids. Tongue swelling resolved so steroids were discontinued and patient is tolerating diet well with no complications. Pt denies any SOB, dysphagia, odynophagia and is hemodynamically stable for discharge 50 year old female with PMH of HTN, DM, breast cancer diagnosed 2019, (on tamoxifen) who initially came to [place] ED complaining of swelling and pain to the tongue. Pt states no new meds and no new soaps/detergents/contact with anything. Mentions the swelling started Tuesday and she was sent home from work. Over the weekend the symptoms somewhat improved. However overnight from monday to tuesday, she noticed tongue swelling with trouble talking. She denies any other symptoms. She had no temperature at home, did not feel generally ill or weak. He only complaints are those localized to the pain and swelling of mouth/throat. She denies shortness of breath, fevers, chills. Of note patient is on Enalapril for HTN and has been for years. ADDITIONALLY, last year she was notes after she was started on tamoxifen in 2019, she had an episode of facial swelling which resolved.  She denies no new foods or environmental exposures that she knows of. Denies hx of drug or environmental allergies. She has worked as a  for 13 years.     Patient was intubated on presentation described as having swollen airway imaging showed enlarged tongue without focal swelling and assymetric enlargement of palatine tonsil. Patient was also started on Abx for concern of Ludwigs angina. Subsequently seen by ENT who recommend angioedema cocktail (Benadryl 50mg IV q8h, Famotidine 20mg IV q12h, Dexamethasone decreased to 4mg q6h)and OMFS who stated that teeth clinically and radiographically without decay, CT findings highly inconsistent with odontogenic source of infection and recommended no acute intervention. Patient had great improvement in swelling over two days. Noted on day 1 with no air leak after deflating ETT cuff. On day 2 patient had exceptional improvement and was weaned from sedation. She was extubated to BIPAP then weaned to NC with good effect. Pt transferred to the floors and weaned from 4LNC to room air. Seen by S&S who recommended regular diet with thin liquids. Tongue swelling resolved so steroids were discontinued and patient is tolerating diet well with no complications. Pt was started on Amlodipine 5mg qD and all her home BP medications were discontinued as her BP has been well controlled and there is a concern for possible allergy to Enalapril that was the cause of pt's angioedema. Pt denies any SOB, dysphagia, odynophagia and is hemodynamically stable for discharge

## 2021-01-23 NOTE — DISCHARGE NOTE PROVIDER - NSDCMRMEDTOKEN_GEN_ALL_CORE_FT
Allergy Relief (Diphenhydramine HCl) 25 mg oral tablet: 2 tab(s) orally 3 times a day   amLODIPine 5 mg oral tablet: 1 tab(s) orally once a day  cyclobenzaprine 5 mg oral tablet: 1 tab(s) orally 3 times a day, As Needed  famotidine 20 mg oral tablet: 1 tab(s) orally 2 times a day  metFORMIN 1000 mg oral tablet, extended release: 1 tab(s) orally once a day  metFORMIN 500 mg oral tablet, extended release: 500 milligram(s) orally 2 times a day  tamoxifen 20 mg oral tablet: 1 tab(s) orally once a day   amLODIPine 5 mg oral tablet: 1 tab(s) orally once a day  cyclobenzaprine 5 mg oral tablet: 1 tab(s) orally 3 times a day, As Needed  metFORMIN 1000 mg oral tablet, extended release: 1 tab(s) orally 2 times a day  tamoxifen 20 mg oral tablet: 1 tab(s) orally once a day

## 2021-01-23 NOTE — PROGRESS NOTE ADULT - PROBLEM SELECTOR PLAN 2
Diabetic on metformin at home.   - ISS q6  - NG tube feeds to start if not considering extubation  - FS- 200-220 will start 2U NPHq6
Diabetic on metformin at home.   - ISS q6  - Check A1c in AM.  - NG tube feeds to start if not considering extubation  - FS- 200-220 will start 2U NPHq6

## 2021-01-23 NOTE — PROGRESS NOTE ADULT - ATTENDING COMMENTS
Patient seen and examined. Chart reviewed.    50 year old woman transferred from [facility] for ENT evaluation after presenting there with shortness of breath intubated on presentation described as having swollen airway. Imaging showed enlarged tongue without focal swelling and asymmetric enlargement of palatine tonsil.      - patient awake and following commands  - did well on SBT  - extubated this morning doing well  - will decrease steroids  - swallow evaluation
Patient seen and exaimined. Chart reviewed.    50 year old woman transferred from [facility] for ENT evaluation after presenting there with shortness of breath intubated on presentation described as having swollen airway imaging showed enlarged tongue without focal swelling and assymetric enlargement of palatine tonsil.  Appreciate ENT and OMFS input current working diagnosis is possible angioedema as patient was on ACE-I.    - narrow ABx coverage follow up cultures  - zero airleak on exam today continue steroids and PPI  - doing well on ventilator
Pt seen and examined. No dysphagia, odynophagia or SOB.    Angioedema/Tonsillitis: s/p extubation. dc steroids and c/w Abx. Tolerating diet    DM type 2: c/w insulin and Trend FS.     Plan for dc with ENT and PCP f/u outpt  Spent 40mins on dc  No lisinopril on dc
Pt seen and examined. No dysphagia, odynophagia or SOB.    Angioedema/Tonsillitis: s/p extubation. c/w steroids and Abx per ENT/OMFS.    DM type 2: c/w insulin and Trend FS

## 2021-01-23 NOTE — PROGRESS NOTE ADULT - PROBLEM SELECTOR PLAN 1
- Received some steroids at OSH ED, started on zosyn now on unasyn  - OMFS: - No acute intervention, teeth clinically and radiographically without decay, CT findings highly inconsistent with odontogenic source of infection. C/W ABx  - ENT: Recommend angioedema cocktail (Benadryl 50mg IV q8h, Famotidine 20mg IV q12h, Dexamethasone decreased to 4mg q6h)  - Update chart to reflect possible allergy to enalapril   - Zosyn for infection (1/19-) switched to unasyn (1/20-), will continue for 2 to 3 weeks per guidelines. Can utilize CRP as marker of treatment success  - Extubated 1/21, satting high 90's on RA, no evidence of stridor  - No cultures (strep/throat or blood drawn so far) would continue to monitor. - Patient without fevers, systemic symptoms prior to intubation. leukocytosis likely 2/2 high doses IV steroids. If febrile, or not improving on exam, culture

## 2021-01-23 NOTE — DISCHARGE NOTE PROVIDER - NSDCCPCAREPLAN_GEN_ALL_CORE_FT
PRINCIPAL DISCHARGE DIAGNOSIS  Diagnosis: Angioedema of respiratory tract  Assessment and Plan of Treatment: You came to the hospital with worsening swelling of throat and tongue which made it difficult for you to breathe.       PRINCIPAL DISCHARGE DIAGNOSIS  Diagnosis: Angioedema of respiratory tract  Assessment and Plan of Treatment: You came to the hospital with worsening swelling of throat and tongue which made it difficult for you to breathe. You had a tube placed down your throat when you arrived for your own safety in order to make sure your throat didn't continyue to swell and make it impossible for you to breathe. While you were in the ICU, we spoke to the Ear, Nose and throat doctors who started you on some medications (Steroids, Benadryl, and Pepcid) that helped your mouth/throat swelling go down enough that we were comfortable taking the tube out and letting your breathe on your own. You can continue to take the Benadryl and Pepcid as needed for allergic reactions. It is believed that your high blood pressure medication Enalapril may have been associated with your symptoms so please stop taking that medication or any related medications. If your tongue/lip/throat swellling comes back, please come back to the emergency room as soon as possible. Please follow up with your PCP in 1-2 weeks after discharge      SECONDARY DISCHARGE DIAGNOSES  Diagnosis: Essential hypertension  Assessment and Plan of Treatment: You have a history of high blood pressure and it is believed that one of your high blood pressure medications (Enalapril) may have been associated with your tongue swelling. Please stop taking your prescribed blood pressure medications (Enalapril, Atenolol, and Triamterene/Hydrochlorothiazide) and start taking the medication we have sent to your pharmacy (Amlodipine) once a day until you follow up with your PCP. Please follow up with your PCP in 1-2 weeks after discharge     PRINCIPAL DISCHARGE DIAGNOSIS  Diagnosis: Angioedema of respiratory tract  Assessment and Plan of Treatment: You came to the hospital with worsening swelling of throat and tongue which made it difficult for you to breathe. You had a tube placed down your throat when you arrived for your own safety in order to make sure your throat didn't continyue to swell and make it impossible for you to breathe. While you were in the ICU, we spoke to the Ear, Nose and throat doctors who started you on some medications (Steroids, Benadryl, and Pepcid) that helped your mouth/throat swelling go down enough that we were comfortable taking the tube out and letting your breathe on your own. You can continue to take the Benadryl and Pepcid as needed for allergic reactions. It is believed that your high blood pressure medication Enalapril may have been associated with your symptoms so please stop taking that medication or any related medications. If your tongue/lip/throat swellling comes back, please come back to the emergency room as soon as possible. Please follow up with your primary care physcian in [place] in 1-2 weeks after discharge      SECONDARY DISCHARGE DIAGNOSES  Diagnosis: Essential hypertension  Assessment and Plan of Treatment: You have a history of high blood pressure and it is believed that one of your high blood pressure medications (Enalapril) may have been associated with your tongue swelling. Please stop taking your prescribed blood pressure medications (Enalapril, Atenolol, and Triamterene/Hydrochlorothiazide) and start taking the medication we have sent to your pharmacy (Amlodipine) once a day until you follow up with your primary care physician. Please follow up with your primary care physician in 1-2 weeks after discharge for further management of your blood pressure.

## 2021-01-23 NOTE — DISCHARGE NOTE PROVIDER - CARE PROVIDER_API CALL
Belinda Yuan (NP; RN)  NP in Family Health  [address] [place] [address]  Phone: [phone]  Fax: [phone]  Established Patient  Follow Up Time: 2 weeks

## 2021-01-23 NOTE — PROGRESS NOTE ADULT - PROBLEM SELECTOR PLAN 4
- History of HTN, currently BP stable.   - Hemodynamically stable at this time, MAP>65 off pressors  - w/ start on Amlodipine 5mg qD
- History of HTN, currently BP stable.   - Hemodynamically stable at this time, MAP>65 off pressors

## 2021-01-23 NOTE — PROGRESS NOTE ADULT - NUTRITIONAL ASSESSMENT
This patient has been assessed with a concern for Malnutrition and has been determined to have a diagnosis/diagnoses of Morbid obesity (BMI > 40).    This patient is being managed with:   Diet Soft-  Consistent Carbohydrate {Evening Snack} (CSTCHOSN)  Entered: Jan 21 2021  7:37PM    
This patient has been assessed with a concern for Malnutrition and has been determined to have a diagnosis/diagnoses of Morbid obesity (BMI > 40).    This patient is being managed with:   Diet Soft-  Consistent Carbohydrate {Evening Snack} (CSTCHOSN)  Entered: Jan 21 2021  7:37PM

## 2021-01-23 NOTE — PROGRESS NOTE ADULT - PROBLEM SELECTOR PROBLEM 3
Malignant neoplasm of right female breast, unspecified estrogen receptor status, unspecified site of breast
Malignant neoplasm of right female breast, unspecified estrogen receptor status, unspecified site of breast

## 2021-01-23 NOTE — DISCHARGE NOTE PROVIDER - DETAILS OF MALNUTRITION DIAGNOSIS/DIAGNOSES
This patient has been assessed with a concern for Malnutrition and was treated during this hospitalization for the following Nutrition diagnosis/diagnoses:     -  01/21/2021: Morbid obesity (BMI > 40)   This patient has been assessed with a concern for Malnutrition and was treated during this hospitalization for the following Nutrition diagnosis/diagnoses:     -  01/21/2021: Morbid obesity (BMI > 40)    This patient has been assessed with a concern for Malnutrition and was treated during this hospitalization for the following Nutrition diagnosis/diagnoses:     -  01/21/2021: Morbid obesity (BMI > 40)   This patient has been assessed with a concern for Malnutrition and was treated during this hospitalization for the following Nutrition diagnosis/diagnoses:     -  01/21/2021: Morbid obesity (BMI > 40)    This patient has been assessed with a concern for Malnutrition and was treated during this hospitalization for the following Nutrition diagnosis/diagnoses:     -  01/21/2021: Morbid obesity (BMI > 40)    This patient has been assessed with a concern for Malnutrition and was treated during this hospitalization for the following Nutrition diagnosis/diagnoses:     -  01/21/2021: Morbid obesity (BMI > 40)

## 2021-01-23 NOTE — PROGRESS NOTE ADULT - ASSESSMENT
50 year old female with PMH of HTN, DM who initially came to [facility] ED complaining of swelling and pain to the tongue concerning for infection vs. nichelle angina vs. unspecified edema s/p intubation and extubated 1/21

## 2021-01-23 NOTE — DISCHARGE NOTE NURSING/CASE MANAGEMENT/SOCIAL WORK - PATIENT PORTAL LINK FT
You can access the FollowMyHealth Patient Portal offered by [facility] by registering at the following website: [email or web address]. By joining [facility]’s FollowMyHealth portal, you will also be able to view your health information using other applications (apps) compatible with our system.

## 2021-01-24 LAB
CULTURE RESULTS: SIGNIFICANT CHANGE UP
CULTURE RESULTS: SIGNIFICANT CHANGE UP
SPECIMEN SOURCE: SIGNIFICANT CHANGE UP
SPECIMEN SOURCE: SIGNIFICANT CHANGE UP

## 2021-02-09 ENCOUNTER — HOSPITAL ENCOUNTER (EMERGENCY)
Age: 51
Discharge: HOME | End: 2021-02-09

## 2021-02-09 ENCOUNTER — APPOINTMENT (OUTPATIENT)
Dept: CT IMAGING | Age: 51
End: 2021-02-09

## 2021-02-09 ENCOUNTER — APPOINTMENT (OUTPATIENT)
Dept: GENERAL RADIOLOGY | Age: 51
End: 2021-02-09

## 2021-02-09 ENCOUNTER — NURSE TRIAGE (OUTPATIENT)
Dept: FAMILY MEDICINE | Age: 51
End: 2021-02-09

## 2021-02-09 VITALS
BODY MASS INDEX: 34.49 KG/M2 | RESPIRATION RATE: 20 BRPM | SYSTOLIC BLOOD PRESSURE: 134 MMHG | HEIGHT: 59 IN | WEIGHT: 171.08 LBS | TEMPERATURE: 97.3 F | DIASTOLIC BLOOD PRESSURE: 83 MMHG | HEART RATE: 69 BPM | OXYGEN SATURATION: 97 %

## 2021-02-09 DIAGNOSIS — R06.00 DYSPNEA, UNSPECIFIED TYPE: ICD-10-CM

## 2021-02-09 DIAGNOSIS — R60.0 BILATERAL LOWER EXTREMITY EDEMA: Primary | ICD-10-CM

## 2021-02-09 DIAGNOSIS — R07.9 CHEST PAIN, UNSPECIFIED TYPE: ICD-10-CM

## 2021-02-09 DIAGNOSIS — E78.5 DYSLIPIDEMIA: ICD-10-CM

## 2021-02-09 DIAGNOSIS — R63.5 WEIGHT GAIN: ICD-10-CM

## 2021-02-09 DIAGNOSIS — R60.0 BILATERAL LEG EDEMA: ICD-10-CM

## 2021-02-09 DIAGNOSIS — G44.84 PRIMARY EXERTIONAL HEADACHE: ICD-10-CM

## 2021-02-09 LAB
ALBUMIN SERPL-MCNC: 3.8 G/DL (ref 3.6–5.1)
ALBUMIN/GLOB SERPL: 1.1 {RATIO} (ref 1–2.4)
ALP SERPL-CCNC: 98 UNITS/L (ref 45–117)
ALT SERPL-CCNC: 35 UNITS/L
ANION GAP SERPL CALC-SCNC: 12 MMOL/L (ref 10–20)
APTT PPP: 26 SEC (ref 22–30)
AST SERPL-CCNC: 23 UNITS/L
ATRIAL RATE (BPM): 73
BASOPHILS # BLD: 0 K/MCL (ref 0–0.3)
BASOPHILS NFR BLD: 0 %
BILIRUB SERPL-MCNC: 0.3 MG/DL (ref 0.2–1)
BUN SERPL-MCNC: 11 MG/DL (ref 6–20)
BUN/CREAT SERPL: 18 (ref 7–25)
CALCIUM SERPL-MCNC: 8.8 MG/DL (ref 8.4–10.2)
CHLORIDE SERPL-SCNC: 104 MMOL/L (ref 98–107)
CO2 SERPL-SCNC: 26 MMOL/L (ref 21–32)
CREAT SERPL-MCNC: 0.61 MG/DL (ref 0.51–0.95)
D DIMER PPP FEU-MCNC: 0.36 MG/L (FEU)
DEPRECATED RDW RBC: 44.3 FL (ref 39–50)
EOSINOPHIL # BLD: 0.1 K/MCL (ref 0–0.5)
EOSINOPHIL NFR BLD: 2 %
ERYTHROCYTE [DISTWIDTH] IN BLOOD: 13.9 % (ref 11–15)
FASTING DURATION TIME PATIENT: ABNORMAL H
GFR SERPLBLD BASED ON 1.73 SQ M-ARVRAT: >90 ML/MIN/1.73M2
GLOBULIN SER-MCNC: 3.5 G/DL (ref 2–4)
GLUCOSE SERPL-MCNC: 100 MG/DL (ref 65–99)
HCT VFR BLD CALC: 38.4 % (ref 36–46.5)
HGB BLD-MCNC: 13 G/DL (ref 12–15.5)
IMM GRANULOCYTES # BLD AUTO: 0 K/MCL (ref 0–0.2)
IMM GRANULOCYTES # BLD: 0 %
INR PPP: 1
LYMPHOCYTES # BLD: 2 K/MCL (ref 1–4.8)
LYMPHOCYTES NFR BLD: 34 %
MAGNESIUM SERPL-MCNC: 2.1 MG/DL (ref 1.7–2.4)
MCH RBC QN AUTO: 29.7 PG (ref 26–34)
MCHC RBC AUTO-ENTMCNC: 33.9 G/DL (ref 32–36.5)
MCV RBC AUTO: 87.9 FL (ref 78–100)
MONOCYTES # BLD: 0.4 K/MCL (ref 0.3–0.9)
MONOCYTES NFR BLD: 7 %
NEUTROPHILS # BLD: 3.4 K/MCL (ref 1.8–7.7)
NEUTROPHILS NFR BLD: 57 %
NRBC BLD MANUAL-RTO: 0 /100 WBC
NT-PROBNP SERPL-MCNC: 32 PG/ML
P AXIS (DEGREES): 53
PLATELET # BLD AUTO: 330 K/MCL (ref 140–450)
POTASSIUM SERPL-SCNC: 3.9 MMOL/L (ref 3.4–5.1)
PR-INTERVAL (MSEC): 140
PROT SERPL-MCNC: 7.3 G/DL (ref 6.4–8.2)
PROTHROMBIN TIME: 10.1 SEC (ref 9.7–11.8)
QRS-INTERVAL (MSEC): 76
QT-INTERVAL (MSEC): 398
QTC: 439
R AXIS (DEGREES): -11
RAINBOW EXTRA TUBES HOLD SPECIMEN: NORMAL
RBC # BLD: 4.37 MIL/MCL (ref 4–5.2)
REPORT TEXT: NORMAL
SODIUM SERPL-SCNC: 138 MMOL/L (ref 135–145)
T AXIS (DEGREES): 47
TROPONIN I SERPL HS-MCNC: <0.02 NG/ML
VENTRICULAR RATE EKG/MIN (BPM): 73
WBC # BLD: 5.9 K/MCL (ref 4.2–11)

## 2021-02-09 PROCEDURE — 70496 CT ANGIOGRAPHY HEAD: CPT | Performed by: RADIOLOGY

## 2021-02-09 PROCEDURE — 99285 EMERGENCY DEPT VISIT HI MDM: CPT | Performed by: PHYSICIAN ASSISTANT

## 2021-02-09 PROCEDURE — 93005 ELECTROCARDIOGRAM TRACING: CPT | Performed by: PHYSICIAN ASSISTANT

## 2021-02-09 PROCEDURE — 10002805 HB CONTRAST AGENT: Performed by: RADIOLOGY

## 2021-02-09 PROCEDURE — 84443 ASSAY THYROID STIM HORMONE: CPT

## 2021-02-09 PROCEDURE — 84484 ASSAY OF TROPONIN QUANT: CPT | Performed by: PHYSICIAN ASSISTANT

## 2021-02-09 PROCEDURE — 93010 ELECTROCARDIOGRAM REPORT: CPT | Performed by: INTERNAL MEDICINE

## 2021-02-09 PROCEDURE — 83880 ASSAY OF NATRIURETIC PEPTIDE: CPT | Performed by: PHYSICIAN ASSISTANT

## 2021-02-09 PROCEDURE — 83735 ASSAY OF MAGNESIUM: CPT | Performed by: PHYSICIAN ASSISTANT

## 2021-02-09 PROCEDURE — 71045 X-RAY EXAM CHEST 1 VIEW: CPT

## 2021-02-09 PROCEDURE — 85730 THROMBOPLASTIN TIME PARTIAL: CPT | Performed by: PHYSICIAN ASSISTANT

## 2021-02-09 PROCEDURE — 80061 LIPID PANEL: CPT

## 2021-02-09 PROCEDURE — 85379 FIBRIN DEGRADATION QUANT: CPT | Performed by: PHYSICIAN ASSISTANT

## 2021-02-09 PROCEDURE — 85610 PROTHROMBIN TIME: CPT | Performed by: PHYSICIAN ASSISTANT

## 2021-02-09 PROCEDURE — 85025 COMPLETE CBC W/AUTO DIFF WBC: CPT | Performed by: PHYSICIAN ASSISTANT

## 2021-02-09 PROCEDURE — 71045 X-RAY EXAM CHEST 1 VIEW: CPT | Performed by: RADIOLOGY

## 2021-02-09 PROCEDURE — 99285 EMERGENCY DEPT VISIT HI MDM: CPT

## 2021-02-09 PROCEDURE — 80053 COMPREHEN METABOLIC PANEL: CPT | Performed by: PHYSICIAN ASSISTANT

## 2021-02-09 PROCEDURE — 70496 CT ANGIOGRAPHY HEAD: CPT

## 2021-02-09 PROCEDURE — 10004651 HB RX, NO CHARGE ITEM: Performed by: RADIOLOGY

## 2021-02-09 PROCEDURE — 10002807 HB RX 258: Performed by: RADIOLOGY

## 2021-02-09 PROCEDURE — 70498 CT ANGIOGRAPHY NECK: CPT | Performed by: RADIOLOGY

## 2021-02-09 PROCEDURE — 83721 ASSAY OF BLOOD LIPOPROTEIN: CPT

## 2021-02-09 PROCEDURE — 36415 COLL VENOUS BLD VENIPUNCTURE: CPT

## 2021-02-09 RX ORDER — 0.9 % SODIUM CHLORIDE 0.9 %
10 VIAL (ML) INJECTION ONCE
Status: COMPLETED | OUTPATIENT
Start: 2021-02-09 | End: 2021-02-09

## 2021-02-09 RX ADMIN — SODIUM CHLORIDE 100 ML: 9 INJECTION, SOLUTION INTRAVENOUS at 13:53

## 2021-02-09 RX ADMIN — SODIUM CHLORIDE 10 ML: 9 INJECTION, SOLUTION INTRAMUSCULAR; INTRAVENOUS; SUBCUTANEOUS at 13:53

## 2021-02-09 RX ADMIN — IOPAMIDOL 75 ML: 755 INJECTION, SOLUTION INTRAVENOUS at 13:53

## 2021-02-09 ASSESSMENT — HEART SCORE
HEART SCORE: 2
RISK FACTORS: 1-2 RISK FACTORS
TROPONIN: EQUAL OR LESS THAN NORMAL LIMIT
HISTORY: SLIGHTLY SUSPICIOUS
AGE: GREATER THAN 45 TO LESS THAN 65
EKG: NORMAL

## 2021-02-09 ASSESSMENT — ENCOUNTER SYMPTOMS
BRUISES/BLEEDS EASILY: 0
NAUSEA: 0
ABDOMINAL PAIN: 0
NERVOUS/ANXIOUS: 0
WOUND: 0
CHILLS: 0
RHINORRHEA: 0
LIGHT-HEADEDNESS: 0
HEADACHES: 1
APPETITE CHANGE: 0
SINUS PRESSURE: 0
DIARRHEA: 0
PHOTOPHOBIA: 0
ADENOPATHY: 0
SORE THROAT: 0
SHORTNESS OF BREATH: 1
FEVER: 0
CONFUSION: 0
WEAKNESS: 0
FATIGUE: 0
COLOR CHANGE: 0
DIZZINESS: 0
BACK PAIN: 0
CHEST TIGHTNESS: 1
ACTIVITY CHANGE: 0
NUMBNESS: 0
VOMITING: 0

## 2021-02-09 ASSESSMENT — PAIN SCALES - GENERAL: PAINLEVEL_OUTOF10: 0

## 2021-02-10 ENCOUNTER — OFFICE VISIT (OUTPATIENT)
Dept: FAMILY MEDICINE | Age: 51
End: 2021-02-10

## 2021-02-10 VITALS
SYSTOLIC BLOOD PRESSURE: 110 MMHG | TEMPERATURE: 98.7 F | WEIGHT: 169.75 LBS | HEART RATE: 98 BPM | BODY MASS INDEX: 34.29 KG/M2 | DIASTOLIC BLOOD PRESSURE: 80 MMHG

## 2021-02-10 DIAGNOSIS — R07.9 CHEST PAIN, UNSPECIFIED TYPE: Primary | ICD-10-CM

## 2021-02-10 DIAGNOSIS — R63.5 WEIGHT GAIN: ICD-10-CM

## 2021-02-10 DIAGNOSIS — R60.0 BILATERAL LEG EDEMA: ICD-10-CM

## 2021-02-10 DIAGNOSIS — K21.9 GASTROESOPHAGEAL REFLUX DISEASE WITHOUT ESOPHAGITIS: ICD-10-CM

## 2021-02-10 DIAGNOSIS — E78.5 DYSLIPIDEMIA: ICD-10-CM

## 2021-02-10 PROCEDURE — 99204 OFFICE O/P NEW MOD 45 MIN: CPT | Performed by: FAMILY MEDICINE

## 2021-02-10 RX ORDER — PANTOPRAZOLE SODIUM 40 MG/1
40 TABLET, DELAYED RELEASE ORAL DAILY
Qty: 30 TABLET | Refills: 0 | Status: SHIPPED | OUTPATIENT
Start: 2021-02-10 | End: 2021-03-19 | Stop reason: SDUPTHER

## 2021-02-10 ASSESSMENT — PATIENT HEALTH QUESTIONNAIRE - PHQ9
CLINICAL INTERPRETATION OF PHQ2 SCORE: NO FURTHER SCREENING NEEDED
2. FEELING DOWN, DEPRESSED OR HOPELESS: NOT AT ALL
CLINICAL INTERPRETATION OF PHQ9 SCORE: NO FURTHER SCREENING NEEDED
SUM OF ALL RESPONSES TO PHQ9 QUESTIONS 1 AND 2: 0
1. LITTLE INTEREST OR PLEASURE IN DOING THINGS: NOT AT ALL
SUM OF ALL RESPONSES TO PHQ9 QUESTIONS 1 AND 2: 0

## 2021-02-11 ENCOUNTER — TELEPHONE (OUTPATIENT)
Dept: FAMILY MEDICINE | Age: 51
End: 2021-02-11

## 2021-02-11 DIAGNOSIS — Z11.52 ENCOUNTER FOR PREPROCEDURE SCREENING LABORATORY TESTING FOR COVID-19: Primary | ICD-10-CM

## 2021-02-11 DIAGNOSIS — R07.9 CHEST PAIN, UNSPECIFIED TYPE: Primary | ICD-10-CM

## 2021-02-11 DIAGNOSIS — Z01.812 ENCOUNTER FOR PREPROCEDURE SCREENING LABORATORY TESTING FOR COVID-19: Primary | ICD-10-CM

## 2021-02-11 LAB
CHOLEST SERPL-MCNC: 316 MG/DL
CHOLEST/HDLC SERPL: 8.8 {RATIO}
FASTING DURATION TIME PATIENT: ABNORMAL H
FASTING DURATION TIME PATIENT: ABNORMAL H
HDLC SERPL-MCNC: 36 MG/DL
LDLC SERPL CALC-MCNC: ABNORMAL MG/DL
LDLC SERPL DIRECT ASSAY-MCNC: 163 MG/DL
NONHDLC SERPL-MCNC: 280 MG/DL
TRIGL SERPL-MCNC: 412 MG/DL
TSH SERPL-ACNC: 0.87 MCUNITS/ML (ref 0.35–5)

## 2021-02-12 ENCOUNTER — TELEPHONE (OUTPATIENT)
Dept: FAMILY MEDICINE | Age: 51
End: 2021-02-12

## 2021-02-12 RX ORDER — ROSUVASTATIN CALCIUM 5 MG/1
5 TABLET, COATED ORAL DAILY
Qty: 30 TABLET | Refills: 0 | Status: SHIPPED | OUTPATIENT
Start: 2021-02-12 | End: 2021-03-19 | Stop reason: SDUPTHER

## 2021-02-19 ENCOUNTER — LAB SERVICES (OUTPATIENT)
Dept: LAB | Age: 51
End: 2021-02-19

## 2021-02-19 DIAGNOSIS — Z01.812 ENCOUNTER FOR PREPROCEDURE SCREENING LABORATORY TESTING FOR COVID-19: ICD-10-CM

## 2021-02-19 DIAGNOSIS — Z11.52 ENCOUNTER FOR PREPROCEDURE SCREENING LABORATORY TESTING FOR COVID-19: ICD-10-CM

## 2021-02-19 PROCEDURE — U0003 INFECTIOUS AGENT DETECTION BY NUCLEIC ACID (DNA OR RNA); SEVERE ACUTE RESPIRATORY SYNDROME CORONAVIRUS 2 (SARS-COV-2) (CORONAVIRUS DISEASE [COVID-19]), AMPLIFIED PROBE TECHNIQUE, MAKING USE OF HIGH THROUGHPUT TECHNOLOGIES AS DESCRIBED BY CMS-2020-01-R: HCPCS | Performed by: INTERNAL MEDICINE

## 2021-02-19 PROCEDURE — U0005 INFEC AGEN DETEC AMPLI PROBE: HCPCS | Performed by: INTERNAL MEDICINE

## 2021-02-20 LAB
SARS-COV-2 RNA RESP QL NAA+PROBE: NOT DETECTED
SERVICE CMNT-IMP: NORMAL
SERVICE CMNT-IMP: NORMAL

## 2021-02-22 ENCOUNTER — IMAGING SERVICES (OUTPATIENT)
Dept: CV DIAGNOSTICS | Age: 51
End: 2021-02-22
Attending: FAMILY MEDICINE

## 2021-02-22 ENCOUNTER — TELEPHONE (OUTPATIENT)
Dept: FAMILY MEDICINE | Age: 51
End: 2021-02-22

## 2021-02-22 VITALS — HEART RATE: 78 BPM | SYSTOLIC BLOOD PRESSURE: 122 MMHG | DIASTOLIC BLOOD PRESSURE: 78 MMHG

## 2021-02-22 DIAGNOSIS — R07.9 CHEST PAIN, UNSPECIFIED TYPE: ICD-10-CM

## 2021-02-22 PROCEDURE — 93352 ADMIN ECG CONTRAST AGENT: CPT | Performed by: INTERNAL MEDICINE

## 2021-02-22 PROCEDURE — 93351 STRESS TTE COMPLETE: CPT | Performed by: INTERNAL MEDICINE

## 2021-02-25 ENCOUNTER — OFFICE VISIT (OUTPATIENT)
Dept: FAMILY MEDICINE | Age: 51
End: 2021-02-25

## 2021-02-25 VITALS
OXYGEN SATURATION: 100 % | BODY MASS INDEX: 34.24 KG/M2 | DIASTOLIC BLOOD PRESSURE: 68 MMHG | HEART RATE: 98 BPM | TEMPERATURE: 97.6 F | WEIGHT: 169.53 LBS | SYSTOLIC BLOOD PRESSURE: 124 MMHG

## 2021-02-25 DIAGNOSIS — K21.9 GASTROESOPHAGEAL REFLUX DISEASE WITHOUT ESOPHAGITIS: ICD-10-CM

## 2021-02-25 DIAGNOSIS — E78.5 DYSLIPIDEMIA: Primary | ICD-10-CM

## 2021-02-25 DIAGNOSIS — E66.9 OBESITY (BMI 30-39.9): ICD-10-CM

## 2021-02-25 PROCEDURE — 99214 OFFICE O/P EST MOD 30 MIN: CPT | Performed by: FAMILY MEDICINE

## 2021-02-25 ASSESSMENT — PATIENT HEALTH QUESTIONNAIRE - PHQ9
SUM OF ALL RESPONSES TO PHQ9 QUESTIONS 1 AND 2: 0
1. LITTLE INTEREST OR PLEASURE IN DOING THINGS: NOT AT ALL
CLINICAL INTERPRETATION OF PHQ2 SCORE: NO FURTHER SCREENING NEEDED
CLINICAL INTERPRETATION OF PHQ9 SCORE: NO FURTHER SCREENING NEEDED
SUM OF ALL RESPONSES TO PHQ9 QUESTIONS 1 AND 2: 0
2. FEELING DOWN, DEPRESSED OR HOPELESS: NOT AT ALL

## 2021-03-01 ENCOUNTER — RESULT REVIEW (OUTPATIENT)
Age: 51
End: 2021-03-01

## 2021-03-15 ENCOUNTER — APPOINTMENT (OUTPATIENT)
Dept: FAMILY MEDICINE | Age: 51
End: 2021-03-15

## 2021-03-19 RX ORDER — PANTOPRAZOLE SODIUM 40 MG/1
40 TABLET, DELAYED RELEASE ORAL DAILY
Qty: 30 TABLET | Refills: 0 | Status: SHIPPED | OUTPATIENT
Start: 2021-03-19 | End: 2021-04-21 | Stop reason: DRUGHIGH

## 2021-03-19 RX ORDER — ROSUVASTATIN CALCIUM 5 MG/1
5 TABLET, COATED ORAL DAILY
Qty: 30 TABLET | Refills: 0 | Status: SHIPPED | OUTPATIENT
Start: 2021-03-19 | End: 2021-04-21 | Stop reason: SDUPTHER

## 2021-03-30 ENCOUNTER — E-ADVICE (OUTPATIENT)
Dept: FAMILY MEDICINE | Age: 51
End: 2021-03-30

## 2021-04-07 ENCOUNTER — TELEPHONE (OUTPATIENT)
Dept: FAMILY MEDICINE | Age: 51
End: 2021-04-07

## 2021-04-20 ENCOUNTER — LAB SERVICES (OUTPATIENT)
Dept: LAB | Age: 51
End: 2021-04-20

## 2021-04-20 DIAGNOSIS — E78.5 DYSLIPIDEMIA: ICD-10-CM

## 2021-04-20 LAB
ALBUMIN SERPL-MCNC: 3.9 G/DL (ref 3.6–5.1)
ALP SERPL-CCNC: 94 UNITS/L (ref 45–117)
ALT SERPL-CCNC: 35 UNITS/L
AST SERPL-CCNC: 19 UNITS/L
BILIRUB CONJ SERPL-MCNC: <0.1 MG/DL (ref 0–0.2)
BILIRUB SERPL-MCNC: 0.2 MG/DL (ref 0.2–1)
CHOLEST SERPL-MCNC: 157 MG/DL
CHOLEST/HDLC SERPL: 4.4 {RATIO}
FASTING DURATION TIME PATIENT: 12 HOURS
HDLC SERPL-MCNC: 36 MG/DL
LDLC SERPL CALC-MCNC: 79 MG/DL
NONHDLC SERPL-MCNC: 121 MG/DL
PROT SERPL-MCNC: 7.1 G/DL (ref 6.4–8.2)
TRIGL SERPL-MCNC: 211 MG/DL

## 2021-04-20 PROCEDURE — 80061 LIPID PANEL: CPT | Performed by: INTERNAL MEDICINE

## 2021-04-20 PROCEDURE — 80076 HEPATIC FUNCTION PANEL: CPT | Performed by: INTERNAL MEDICINE

## 2021-04-20 PROCEDURE — 36415 COLL VENOUS BLD VENIPUNCTURE: CPT | Performed by: INTERNAL MEDICINE

## 2021-04-21 ENCOUNTER — TELEPHONE (OUTPATIENT)
Dept: FAMILY MEDICINE | Age: 51
End: 2021-04-21

## 2021-04-21 ENCOUNTER — OFFICE VISIT (OUTPATIENT)
Dept: FAMILY MEDICINE | Age: 51
End: 2021-04-21

## 2021-04-21 VITALS
SYSTOLIC BLOOD PRESSURE: 128 MMHG | HEART RATE: 87 BPM | DIASTOLIC BLOOD PRESSURE: 72 MMHG | TEMPERATURE: 98 F | BODY MASS INDEX: 33.84 KG/M2 | WEIGHT: 167.55 LBS

## 2021-04-21 DIAGNOSIS — Z00.00 ANNUAL PHYSICAL EXAM: Primary | ICD-10-CM

## 2021-04-21 DIAGNOSIS — E78.5 DYSLIPIDEMIA: Primary | ICD-10-CM

## 2021-04-21 DIAGNOSIS — E66.9 OBESITY (BMI 30-39.9): ICD-10-CM

## 2021-04-21 DIAGNOSIS — K21.9 GASTROESOPHAGEAL REFLUX DISEASE WITHOUT ESOPHAGITIS: ICD-10-CM

## 2021-04-21 PROCEDURE — 99214 OFFICE O/P EST MOD 30 MIN: CPT | Performed by: FAMILY MEDICINE

## 2021-04-21 RX ORDER — PANTOPRAZOLE SODIUM 40 MG/1
40 TABLET, DELAYED RELEASE ORAL DAILY
Qty: 30 TABLET | Refills: 0 | Status: CANCELLED | OUTPATIENT
Start: 2021-04-21

## 2021-04-21 RX ORDER — ROSUVASTATIN CALCIUM 5 MG/1
5 TABLET, COATED ORAL DAILY
Qty: 90 TABLET | Refills: 1 | Status: SHIPPED | OUTPATIENT
Start: 2021-04-21 | End: 2021-10-07 | Stop reason: SDUPTHER

## 2021-04-21 RX ORDER — PANTOPRAZOLE SODIUM 20 MG/1
20 TABLET, DELAYED RELEASE ORAL DAILY
Qty: 90 TABLET | Refills: 1 | Status: SHIPPED | OUTPATIENT
Start: 2021-04-21 | End: 2021-10-07 | Stop reason: SDUPTHER

## 2021-04-21 ASSESSMENT — PATIENT HEALTH QUESTIONNAIRE - PHQ9
2. FEELING DOWN, DEPRESSED OR HOPELESS: NOT AT ALL
CLINICAL INTERPRETATION OF PHQ9 SCORE: NO FURTHER SCREENING NEEDED
SUM OF ALL RESPONSES TO PHQ9 QUESTIONS 1 AND 2: 0
CLINICAL INTERPRETATION OF PHQ2 SCORE: NO FURTHER SCREENING NEEDED
1. LITTLE INTEREST OR PLEASURE IN DOING THINGS: NOT AT ALL
SUM OF ALL RESPONSES TO PHQ9 QUESTIONS 1 AND 2: 0

## 2021-06-01 PROBLEM — C50.911 MALIGNANT NEOPLASM OF UNSPECIFIED SITE OF RIGHT FEMALE BREAST: Chronic | Status: ACTIVE | Noted: 2018-07-16

## 2021-06-02 ENCOUNTER — RESULT REVIEW (OUTPATIENT)
Age: 51
End: 2021-06-02

## 2021-06-02 ENCOUNTER — APPOINTMENT (OUTPATIENT)
Dept: MAMMOGRAPHY | Facility: CLINIC | Age: 51
End: 2021-06-02
Payer: COMMERCIAL

## 2021-06-02 ENCOUNTER — APPOINTMENT (OUTPATIENT)
Dept: ULTRASOUND IMAGING | Facility: CLINIC | Age: 51
End: 2021-06-02
Payer: COMMERCIAL

## 2021-06-02 ENCOUNTER — APPOINTMENT (OUTPATIENT)
Dept: SURGICAL ONCOLOGY | Facility: CLINIC | Age: 51
End: 2021-06-02
Payer: COMMERCIAL

## 2021-06-02 ENCOUNTER — OUTPATIENT (OUTPATIENT)
Dept: OUTPATIENT SERVICES | Facility: HOSPITAL | Age: 51
LOS: 1 days | End: 2021-06-02
Payer: COMMERCIAL

## 2021-06-02 DIAGNOSIS — Z98.51 TUBAL LIGATION STATUS: Chronic | ICD-10-CM

## 2021-06-02 DIAGNOSIS — Z98.890 OTHER SPECIFIED POSTPROCEDURAL STATES: Chronic | ICD-10-CM

## 2021-06-02 DIAGNOSIS — C50.911 MALIGNANT NEOPLASM OF UNSPECIFIED SITE OF RIGHT FEMALE BREAST: ICD-10-CM

## 2021-06-02 PROCEDURE — 77066 DX MAMMO INCL CAD BI: CPT | Mod: 26

## 2021-06-02 PROCEDURE — 76641 ULTRASOUND BREAST COMPLETE: CPT | Mod: 26,50

## 2021-06-02 PROCEDURE — G0279: CPT | Mod: 26

## 2021-06-02 PROCEDURE — 76641 ULTRASOUND BREAST COMPLETE: CPT

## 2021-06-02 PROCEDURE — 77066 DX MAMMO INCL CAD BI: CPT

## 2021-06-02 PROCEDURE — G0279: CPT

## 2021-06-07 ENCOUNTER — NURSE TRIAGE (OUTPATIENT)
Dept: FAMILY MEDICINE | Age: 51
End: 2021-06-07

## 2021-06-09 ENCOUNTER — APPOINTMENT (OUTPATIENT)
Dept: SURGICAL ONCOLOGY | Facility: CLINIC | Age: 51
End: 2021-06-09
Payer: COMMERCIAL

## 2021-06-15 ENCOUNTER — CLINICAL ABSTRACT (OUTPATIENT)
Dept: OTHER | Age: 51
End: 2021-06-15

## 2021-06-23 ENCOUNTER — APPOINTMENT (OUTPATIENT)
Dept: SURGICAL ONCOLOGY | Facility: CLINIC | Age: 51
End: 2021-06-23
Payer: COMMERCIAL

## 2021-06-23 VITALS
BODY MASS INDEX: 39.75 KG/M2 | RESPIRATION RATE: 18 BRPM | DIASTOLIC BLOOD PRESSURE: 92 MMHG | OXYGEN SATURATION: 99 % | HEART RATE: 85 BPM | WEIGHT: 216 LBS | HEIGHT: 62 IN | SYSTOLIC BLOOD PRESSURE: 155 MMHG

## 2021-06-23 PROCEDURE — 99215 OFFICE O/P EST HI 40 MIN: CPT

## 2021-06-23 NOTE — ADDENDUM
[FreeTextEntry1] : I, Mercedes Ndiaye, acted solely as a scribe for Dr. Luis Mazariegos on this date 06/23/2021.\par \par \par \par

## 2021-06-23 NOTE — PHYSICAL EXAM
[Normal] : supple, no neck mass and thyroid not enlarged [Normal Neck Lymph Nodes] : normal neck lymph nodes  [Normal Supraclavicular Lymph Nodes] : normal supraclavicular lymph nodes [Normal Groin Lymph Nodes] : normal groin lymph nodes [Normal Axillary Lymph Nodes] : normal axillary lymph nodes [Normal] : oriented to person, place and time, with appropriate affect [de-identified] : Right breast lumpectomy and axillary scars well healed. no masses or adenopathy.

## 2021-06-23 NOTE — HISTORY OF PRESENT ILLNESS
[de-identified] : 51 y/o female presents for a 6 month follow up exam. \par She is s/p right breast lumpectomy post Lali  reflector placement, right axillary/sentinel node biopsy on 7/17/18. 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  She continues medical oncology surveillance with Dr. Li. \par Pt is fully Covid vaccinated. \par \par Recent MMG/US 6/2/21: No mammographic evidence of malignancy. Left breast cysts. Recommendation of mammography in 1 year. (BI-RADS 2)\par \par Mammogram (11/3/20) revealed bilateral stable probable benign calcifications. Follow-up diagnostic mammogram in 6 months is recommended at the time of annual exam. BI-RADS 3. \par \par Bilateral MMG/US 3/26/19: multiple bilateral cysts. No evidence of malignancy. BIRADS-2.  \par US Pelvic & Transvaginal (7/13/19): Normal pelvic sonogram.\par \par Most recent bilateral mammogram performed in May 2020 demonstrated bilateral probable benign calcifications for which a follow-up diagnostic mammogram in 6 months is advised (BIRADS 3).  No US performed.\par \par Denies any previous breast biopsies. \par No family history of breast or ovarian cancer.

## 2021-06-23 NOTE — ASSESSMENT
[FreeTextEntry1] : Stage I right breast cancer \par JOSE\par Continue hormonal tx as per Dr. Li\par Continue yearly imaging June 2022\par RTO 6 months

## 2021-06-25 ENCOUNTER — OUTPATIENT (OUTPATIENT)
Dept: OUTPATIENT SERVICES | Facility: HOSPITAL | Age: 51
LOS: 1 days | Discharge: HOME | End: 2021-06-25

## 2021-06-25 ENCOUNTER — APPOINTMENT (OUTPATIENT)
Dept: ENDOCRINOLOGY | Age: 51
End: 2021-06-25
Attending: FAMILY MEDICINE

## 2021-06-25 DIAGNOSIS — Z98.51 TUBAL LIGATION STATUS: Chronic | ICD-10-CM

## 2021-06-25 DIAGNOSIS — Z98.890 OTHER SPECIFIED POSTPROCEDURAL STATES: Chronic | ICD-10-CM

## 2021-06-25 DIAGNOSIS — C50.919 MALIGNANT NEOPLASM OF UNSPECIFIED SITE OF UNSPECIFIED FEMALE BREAST: ICD-10-CM

## 2021-06-30 ENCOUNTER — APPOINTMENT (OUTPATIENT)
Dept: HEMATOLOGY ONCOLOGY | Facility: CLINIC | Age: 51
End: 2021-06-30
Payer: COMMERCIAL

## 2021-06-30 ENCOUNTER — RESULT REVIEW (OUTPATIENT)
Age: 51
End: 2021-06-30

## 2021-06-30 VITALS
WEIGHT: 217.57 LBS | OXYGEN SATURATION: 97 % | BODY MASS INDEX: 36.25 KG/M2 | SYSTOLIC BLOOD PRESSURE: 144 MMHG | RESPIRATION RATE: 18 BRPM | HEIGHT: 65 IN | TEMPERATURE: 97.7 F | DIASTOLIC BLOOD PRESSURE: 89 MMHG | HEART RATE: 97 BPM

## 2021-06-30 DIAGNOSIS — N91.2 AMENORRHEA, UNSPECIFIED: ICD-10-CM

## 2021-06-30 LAB
BASOPHILS # BLD AUTO: 0.09 K/UL — SIGNIFICANT CHANGE UP (ref 0–0.2)
BASOPHILS NFR BLD AUTO: 0.5 % — SIGNIFICANT CHANGE UP (ref 0–2)
EOSINOPHIL # BLD AUTO: 0.51 K/UL — HIGH (ref 0–0.5)
EOSINOPHIL NFR BLD AUTO: 3.1 % — SIGNIFICANT CHANGE UP (ref 0–6)
HCT VFR BLD CALC: 43.2 % — SIGNIFICANT CHANGE UP (ref 34.5–45)
HGB BLD-MCNC: 13.9 G/DL — SIGNIFICANT CHANGE UP (ref 11.5–15.5)
IMM GRANULOCYTES NFR BLD AUTO: 1.4 % — SIGNIFICANT CHANGE UP (ref 0–1.5)
LYMPHOCYTES # BLD AUTO: 21.9 % — SIGNIFICANT CHANGE UP (ref 13–44)
LYMPHOCYTES # BLD AUTO: 3.64 K/UL — HIGH (ref 1–3.3)
MCHC RBC-ENTMCNC: 29.7 PG — SIGNIFICANT CHANGE UP (ref 27–34)
MCHC RBC-ENTMCNC: 32.2 G/DL — SIGNIFICANT CHANGE UP (ref 32–36)
MCV RBC AUTO: 92.3 FL — SIGNIFICANT CHANGE UP (ref 80–100)
MONOCYTES # BLD AUTO: 1.37 K/UL — HIGH (ref 0–0.9)
MONOCYTES NFR BLD AUTO: 8.3 % — SIGNIFICANT CHANGE UP (ref 2–14)
NEUTROPHILS # BLD AUTO: 10.75 K/UL — HIGH (ref 1.8–7.4)
NEUTROPHILS NFR BLD AUTO: 64.8 % — SIGNIFICANT CHANGE UP (ref 43–77)
NRBC # BLD: 0 /100 WBCS — SIGNIFICANT CHANGE UP (ref 0–0)
PLATELET # BLD AUTO: 287 K/UL — SIGNIFICANT CHANGE UP (ref 150–400)
RBC # BLD: 4.68 M/UL — SIGNIFICANT CHANGE UP (ref 3.8–5.2)
RBC # FLD: 14.5 % — SIGNIFICANT CHANGE UP (ref 10.3–14.5)
WBC # BLD: 16.59 K/UL — HIGH (ref 3.8–10.5)
WBC # FLD AUTO: 16.59 K/UL — HIGH (ref 3.8–10.5)

## 2021-06-30 PROCEDURE — 99214 OFFICE O/P EST MOD 30 MIN: CPT

## 2021-06-30 NOTE — PHYSICAL EXAM
[Fully active, able to carry on all pre-disease performance without restriction] : Status 0 - Fully active, able to carry on all pre-disease performance without restriction [Obese] : obese [Normal] : affect appropriate [de-identified] : RIGHT BREAST lumpectomy and axillary scar, mild post RT changes

## 2021-06-30 NOTE — ASSESSMENT
[Curative] : Goals of care discussed with patient: Curative [FreeTextEntry1] : Ms. GIUSEPPE WYNN is a 49 yo female with stage 1A ([id number]) invasive ductal carcinoma breast ER/RI +, HER2 neg s/p right lumpectomy and adjuvant radiation.  oncotype 16. Started on tamoxifen January 2019.  \par \par - Breast ca: She is tolerating tamoxifen very well without significant side effects. Reports good compliance. Plan to continue tamoxifen for 10 years. Will switch to AI after menopause. FSH low last visit, recheck today. GYN and opthal f/u annually due to risk of endometrial ca and cataracts respectively.  Up to date with Breast imaging.\par -  Mild hot flashes: 2/2 tamoxifen.  Advised to wear layers and use fan prn. Consider Effexor if get worse.\par - Wt gain:Life style modifications, healthy diet and exercise d/w her\par - Irregular periods: 2/2 perimenopause. Annual GYN f/u. She will continue to use non-hormonal contraception. Recheck FSH/estradiol today\par - Patient is aware of signs and symptoms of DVT/PE. Patient will report if she develops acute onset chest pain shortness of breath, leg swelling or calf pain. \par - She has HTN, well controlled. Continue ASA for CVA/VTE ppx. \par RTC 6 m. \par

## 2021-06-30 NOTE — HISTORY OF PRESENT ILLNESS
[Disease: _____________________] : Disease: [unfilled] [T: ___] : T[unfilled] [N: ___] : N[unfilled] [M: ___] : M[unfilled] [AJCC Stage: ____] : AJCC Stage: [unfilled] [de-identified] : Pt is a 48 yo female w/ pmhx HTN and DMII who initially presented with abnormal right mammogram and breast mass in 3/2018. US guided right breast core biopsy in 4/2018 showed infiltrating ductal carcinoma of 1 cm mass. She was seen by surgical oncology. She underwent right lumpectomy and Right sentinal node biopsy in 7/2018 which revealed 1.2 cm  moderately differentiated ductal carcinoma with [place] score 6/9. ER 80-90% positive, [place] 20-25% positive. Her-2 was equivocal with negative FISH. 1.2 cm mass. 3 sentinel nodes were negative. Stage 1A- [id number]. \par OncotypeDx is 16 \par RT delayed 2/2 insurance issues\par \par Completed Radiation therapy 12/11/18\par tamoxifen started 1/2019 [de-identified] : moderately differentiated ductal carcinoma  [de-identified] : ER 80-90%, [place] 20-25%, HER2 negative  [de-identified] : - genetic testing - 17 gene testing all came back negative. No further testing needed. [FreeTextEntry1] : tamoxifen started 1/2019 [de-identified] : Ms. GIUSEPPE WYNN is here for a follow up visit today for stage I right breast ca dx in 2018, s/p RT and on Tamoxifen since 2019. Tx care from clinic 2019\par She is tolerating tamoxifen well with tolerable hot flashes. Good compliance. She denies mood changes, vaginal dryness, SOB, chest pain, leg swelling or clotting issues. Her energy and appetite is good. She is overweight, discussed exercise and diet to loose wt\par LMP date 2018,  no intermittent spotting, no vaginal discharge. She hasn’t been using contraception, was reminded to use condoms. Chemically premenopausal 2019\par [facility], doesn[facility]t remember name of GYN. Last seen 3/2021\par Breast imagin2021, birads 2\par Opthal:2021\par She has HTN, controlled, she does take baby ASA

## 2021-07-01 LAB
25(OH)D3 SERPL-MCNC: 33.2 NG/ML
ALBUMIN SERPL ELPH-MCNC: 3.8 G/DL
ALP BLD-CCNC: 81 U/L
ALT SERPL-CCNC: 15 U/L
ANION GAP SERPL CALC-SCNC: 12 MMOL/L
AST SERPL-CCNC: 14 U/L
BILIRUB SERPL-MCNC: 0.3 MG/DL
BUN SERPL-MCNC: 20 MG/DL
CALCIUM SERPL-MCNC: 9.7 MG/DL
CHLORIDE SERPL-SCNC: 101 MMOL/L
CO2 SERPL-SCNC: 26 MMOL/L
CREAT SERPL-MCNC: 0.69 MG/DL
ESTRADIOL SERPL-MCNC: <5 PG/ML
FSH SERPL-MCNC: 11.3 IU/L
GLUCOSE SERPL-MCNC: 143 MG/DL
POTASSIUM SERPL-SCNC: 4.2 MMOL/L
PROT SERPL-MCNC: 6.2 G/DL
SODIUM SERPL-SCNC: 140 MMOL/L

## 2021-07-21 ENCOUNTER — APPOINTMENT (OUTPATIENT)
Dept: RADIATION ONCOLOGY | Facility: CLINIC | Age: 51
End: 2021-07-21
Payer: COMMERCIAL

## 2021-07-21 VITALS
SYSTOLIC BLOOD PRESSURE: 142 MMHG | HEIGHT: 65 IN | DIASTOLIC BLOOD PRESSURE: 90 MMHG | WEIGHT: 217 LBS | BODY MASS INDEX: 36.15 KG/M2

## 2021-07-21 PROCEDURE — 99213 OFFICE O/P EST LOW 20 MIN: CPT

## 2021-07-21 RX ORDER — ASPIRIN ENTERIC COATED TABLETS 81 MG 81 MG/1
81 TABLET, DELAYED RELEASE ORAL
Qty: 90 | Refills: 2 | Status: DISCONTINUED | COMMUNITY
Start: 2019-11-04 | End: 2021-07-21

## 2021-07-21 RX ORDER — ATENOLOL 100 MG/1
100 TABLET ORAL
Refills: 0 | Status: DISCONTINUED | COMMUNITY
End: 2021-07-21

## 2021-07-21 NOTE — HISTORY OF PRESENT ILLNESS
[FreeTextEntry1] :   This 50-year-old  female presented in March of 2018 with findings of abnormal routine screening mamography. Biopsy of the lesion in the right breast confirmed an invasive ductal carcinoma. She had an MRI confirming these findings and proceeded to lumpectomy in July. Findings from the pathology report at lumpectomy showed an invasive ductal carcinoma measuring 1. 2 cm, with a [place] score 6/9. There were no metastases to 3 sentinel lymph nodes, surgical margins were negative and there was no lymphovascular invasion. ER+ NH+ HER2-. Oncotype Dx 16. She was not considered a candidate for chemotherapy and was referred for consideration of adjuvant radiation therapy treatments to improve the rate of local control. There was some delay between lumpectomy and referral for radiation due to insurance issues. She completed RT 12/6/18 under the direction of Dr. Star Ley.\par \par She presents for a 2 year 7 month follow up. Feeling well. No complaints. Mammo/ultrasound on 6/2/21 showed left breast cysts- probably benign. She is currently on Tamoxifen and tolerating well.\par

## 2021-07-21 NOTE — PHYSICAL EXAM
[No UE Edema] : there is no upper extremity edema [Supraclavicular Lymph Nodes Enlarged Bilaterally] : supraclavicular [Axillary Lymph Nodes Enlarged Bilaterally] : axillary [Normal] : oriented to person, place and time, the affect was normal, the mood was normal and not anxious [de-identified] : right breast with post-radiation changes.

## 2021-07-21 NOTE — DISEASE MANAGEMENT
[Pathological] : TNM Stage: p [IA] : IA [FreeTextEntry4] : G2, ER KS pos, HER2 neg [TTNM] : 1c [NTNM] : 0 [MTNM] : 0

## 2021-10-07 ENCOUNTER — OFFICE VISIT (OUTPATIENT)
Dept: ENDOCRINOLOGY | Age: 51
End: 2021-10-07
Attending: FAMILY MEDICINE

## 2021-10-07 ENCOUNTER — TELEPHONE (OUTPATIENT)
Dept: EDUCATION SERVICES | Age: 51
End: 2021-10-07

## 2021-10-07 VITALS
HEART RATE: 84 BPM | DIASTOLIC BLOOD PRESSURE: 72 MMHG | SYSTOLIC BLOOD PRESSURE: 118 MMHG | BODY MASS INDEX: 34.52 KG/M2 | WEIGHT: 170.9 LBS

## 2021-10-07 DIAGNOSIS — Z86.32 HISTORY OF GESTATIONAL DIABETES: ICD-10-CM

## 2021-10-07 DIAGNOSIS — R40.0 DAYTIME SOMNOLENCE: ICD-10-CM

## 2021-10-07 DIAGNOSIS — E28.2 PCOS (POLYCYSTIC OVARIAN SYNDROME): ICD-10-CM

## 2021-10-07 PROCEDURE — 99204 OFFICE O/P NEW MOD 45 MIN: CPT | Performed by: INTERNAL MEDICINE

## 2021-10-07 RX ORDER — TRANEXAMIC ACID 650 MG/1
1300 TABLET ORAL
COMMUNITY
Start: 2021-10-04 | End: 2021-10-09

## 2021-10-07 RX ORDER — ROSUVASTATIN CALCIUM 5 MG/1
5 TABLET, COATED ORAL DAILY
Qty: 30 TABLET | Refills: 0 | Status: SHIPPED | OUTPATIENT
Start: 2021-10-07 | End: 2021-11-24 | Stop reason: SDUPTHER

## 2021-10-07 RX ORDER — PANTOPRAZOLE SODIUM 20 MG/1
20 TABLET, DELAYED RELEASE ORAL DAILY
Qty: 30 TABLET | Refills: 0 | Status: SHIPPED | OUTPATIENT
Start: 2021-10-07 | End: 2021-10-21 | Stop reason: SDUPTHER

## 2021-10-07 RX ORDER — DIPHENHYDRAMINE HCL 25 MG
1 CAPSULE ORAL PRN
COMMUNITY
End: 2023-03-30

## 2021-10-07 RX ORDER — ACETAMINOPHEN AND CODEINE PHOSPHATE 120; 12 MG/5ML; MG/5ML
0.35 SOLUTION ORAL DAILY
COMMUNITY
Start: 2021-10-04 | End: 2022-04-27 | Stop reason: ALTCHOICE

## 2021-10-12 ENCOUNTER — LAB SERVICES (OUTPATIENT)
Dept: LAB | Age: 51
End: 2021-10-12

## 2021-10-12 ENCOUNTER — TELEPHONE (OUTPATIENT)
Dept: ENDOCRINOLOGY | Age: 51
End: 2021-10-12

## 2021-10-12 DIAGNOSIS — Z00.00 ANNUAL PHYSICAL EXAM: ICD-10-CM

## 2021-10-12 DIAGNOSIS — Z86.32 HISTORY OF GESTATIONAL DIABETES: ICD-10-CM

## 2021-10-12 LAB
ALBUMIN SERPL-MCNC: 3.4 G/DL (ref 3.6–5.1)
ALBUMIN/GLOB SERPL: 0.9 {RATIO} (ref 1–2.4)
ALP SERPL-CCNC: 102 UNITS/L (ref 45–117)
ALT SERPL-CCNC: 31 UNITS/L
ANION GAP SERPL CALC-SCNC: 6 MMOL/L (ref 10–20)
AST SERPL-CCNC: 20 UNITS/L
BASOPHILS # BLD: 0 K/MCL (ref 0–0.3)
BASOPHILS NFR BLD: 0 %
BILIRUB SERPL-MCNC: 0.2 MG/DL (ref 0.2–1)
BUN SERPL-MCNC: 11 MG/DL (ref 6–20)
BUN/CREAT SERPL: 15 (ref 7–25)
CALCIUM SERPL-MCNC: 8.6 MG/DL (ref 8.4–10.2)
CHLORIDE SERPL-SCNC: 106 MMOL/L (ref 98–107)
CHOLEST SERPL-MCNC: 304 MG/DL
CHOLEST/HDLC SERPL: 11.3 {RATIO}
CO2 SERPL-SCNC: 26 MMOL/L (ref 21–32)
CREAT SERPL-MCNC: 0.72 MG/DL (ref 0.51–0.95)
DEPRECATED RDW RBC: 48.2 FL (ref 39–50)
EOSINOPHIL # BLD: 0.2 K/MCL (ref 0–0.5)
EOSINOPHIL NFR BLD: 2 %
ERYTHROCYTE [DISTWIDTH] IN BLOOD: 15.4 % (ref 11–15)
FASTING DURATION TIME PATIENT: 12 HOURS (ref 0–999)
GFR SERPLBLD BASED ON 1.73 SQ M-ARVRAT: >90 ML/MIN
GLOBULIN SER-MCNC: 3.6 G/DL (ref 2–4)
GLUCOSE SERPL-MCNC: 103 MG/DL (ref 70–99)
HBA1C MFR BLD: 5.9 % (ref 4.5–5.6)
HCT VFR BLD CALC: 35.4 % (ref 36–46.5)
HDLC SERPL-MCNC: 27 MG/DL
HGB BLD-MCNC: 11.7 G/DL (ref 12–15.5)
LDLC SERPL CALC-MCNC: ABNORMAL MG/DL
LDLC SERPL DIRECT ASSAY-MCNC: 120 MG/DL
LYMPHOCYTES # BLD: 1.8 K/MCL (ref 1–4.8)
LYMPHOCYTES NFR BLD: 27 %
MCH RBC QN AUTO: 28.4 PG (ref 26–34)
MCHC RBC AUTO-ENTMCNC: 33.1 G/DL (ref 32–36.5)
MCV RBC AUTO: 85.9 FL (ref 78–100)
MONOCYTES # BLD: 0.5 K/MCL (ref 0.3–0.9)
MONOCYTES NFR BLD: 8 %
NEUTROPHILS # BLD: 4.3 K/MCL (ref 1.8–7.7)
NEUTROPHILS NFR BLD: 63 %
NONHDLC SERPL-MCNC: 277 MG/DL
PLATELET # BLD AUTO: 273 K/MCL (ref 140–450)
POTASSIUM SERPL-SCNC: 4.1 MMOL/L (ref 3.4–5.1)
PROT SERPL-MCNC: 7 G/DL (ref 6.4–8.2)
RBC # BLD: 4.12 MIL/MCL (ref 4–5.2)
SODIUM SERPL-SCNC: 134 MMOL/L (ref 135–145)
TRIGL SERPL-MCNC: 695 MG/DL
WBC # BLD: 6.8 K/MCL (ref 4.2–11)

## 2021-10-12 PROCEDURE — 83721 ASSAY OF BLOOD LIPOPROTEIN: CPT | Performed by: INTERNAL MEDICINE

## 2021-10-12 PROCEDURE — 80061 LIPID PANEL: CPT | Performed by: INTERNAL MEDICINE

## 2021-10-12 PROCEDURE — 85025 COMPLETE CBC W/AUTO DIFF WBC: CPT | Performed by: INTERNAL MEDICINE

## 2021-10-12 PROCEDURE — 80053 COMPREHEN METABOLIC PANEL: CPT | Performed by: INTERNAL MEDICINE

## 2021-10-12 PROCEDURE — 36415 COLL VENOUS BLD VENIPUNCTURE: CPT | Performed by: INTERNAL MEDICINE

## 2021-10-12 PROCEDURE — 83036 HEMOGLOBIN GLYCOSYLATED A1C: CPT | Performed by: INTERNAL MEDICINE

## 2021-10-21 ENCOUNTER — OFFICE VISIT (OUTPATIENT)
Dept: FAMILY MEDICINE | Age: 51
End: 2021-10-21

## 2021-10-21 VITALS
WEIGHT: 173.8 LBS | DIASTOLIC BLOOD PRESSURE: 80 MMHG | SYSTOLIC BLOOD PRESSURE: 125 MMHG | HEART RATE: 87 BPM | OXYGEN SATURATION: 98 % | BODY MASS INDEX: 35.1 KG/M2

## 2021-10-21 DIAGNOSIS — E78.5 DYSLIPIDEMIA: Primary | ICD-10-CM

## 2021-10-21 DIAGNOSIS — D50.0 ANEMIA DUE TO BLOOD LOSS, CHRONIC: ICD-10-CM

## 2021-10-21 DIAGNOSIS — R73.03 PREDIABETES: ICD-10-CM

## 2021-10-21 DIAGNOSIS — K21.9 GASTROESOPHAGEAL REFLUX DISEASE WITHOUT ESOPHAGITIS: ICD-10-CM

## 2021-10-21 PROCEDURE — 99214 OFFICE O/P EST MOD 30 MIN: CPT | Performed by: FAMILY MEDICINE

## 2021-10-21 RX ORDER — PANTOPRAZOLE SODIUM 20 MG/1
20 TABLET, DELAYED RELEASE ORAL DAILY
Qty: 90 TABLET | Refills: 1 | Status: SHIPPED | OUTPATIENT
Start: 2021-10-21 | End: 2022-02-01 | Stop reason: SDUPTHER

## 2021-10-22 ENCOUNTER — TELEPHONE (OUTPATIENT)
Dept: FAMILY MEDICINE | Age: 51
End: 2021-10-22

## 2021-10-22 ENCOUNTER — LAB SERVICES (OUTPATIENT)
Dept: LAB | Age: 51
End: 2021-10-22

## 2021-10-22 DIAGNOSIS — D50.0 ANEMIA DUE TO BLOOD LOSS, CHRONIC: ICD-10-CM

## 2021-10-22 DIAGNOSIS — Z86.32 HISTORY OF GESTATIONAL DIABETES: ICD-10-CM

## 2021-10-22 DIAGNOSIS — D50.9 IRON DEFICIENCY ANEMIA, UNSPECIFIED IRON DEFICIENCY ANEMIA TYPE: Primary | ICD-10-CM

## 2021-10-22 LAB
FASTING DURATION TIME PATIENT: 12 HOURS (ref 0–999)
FERRITIN SERPL-MCNC: 13 NG/ML (ref 8–252)
GLUCOSE 2H P 75 G GLC PO SERPL-MCNC: 185 MG/DL (ref 65–139)
GLUCOSE SERPL-MCNC: 113 MG/DL (ref 70–99)
IRON SATN MFR SERPL: 9 % (ref 15–45)
IRON SERPL-MCNC: 40 MCG/DL (ref 50–170)
TIBC SERPL-MCNC: 445 MCG/DL (ref 250–450)

## 2021-10-22 PROCEDURE — 82950 GLUCOSE TEST: CPT | Performed by: INTERNAL MEDICINE

## 2021-10-22 PROCEDURE — 82947 ASSAY GLUCOSE BLOOD QUANT: CPT | Performed by: INTERNAL MEDICINE

## 2021-10-22 PROCEDURE — 36415 COLL VENOUS BLD VENIPUNCTURE: CPT | Performed by: INTERNAL MEDICINE

## 2021-10-22 PROCEDURE — 83550 IRON BINDING TEST: CPT | Performed by: INTERNAL MEDICINE

## 2021-10-22 PROCEDURE — 82728 ASSAY OF FERRITIN: CPT | Performed by: INTERNAL MEDICINE

## 2021-10-22 PROCEDURE — 83540 ASSAY OF IRON: CPT | Performed by: INTERNAL MEDICINE

## 2021-10-22 RX ORDER — FERROUS GLUCONATE 324(37.5)
324 TABLET ORAL 2 TIMES DAILY
Status: SHIPPED | COMMUNITY
Start: 2021-10-22 | End: 2022-08-30 | Stop reason: ALTCHOICE

## 2021-10-27 LAB — HM MAMMOGRAPHY BILATERAL: NORMAL

## 2021-11-01 ENCOUNTER — TELEPHONE (OUTPATIENT)
Dept: FAMILY MEDICINE | Age: 51
End: 2021-11-01

## 2021-11-01 ENCOUNTER — OFFICE VISIT (OUTPATIENT)
Dept: FAMILY MEDICINE | Age: 51
End: 2021-11-01

## 2021-11-01 VITALS
HEART RATE: 82 BPM | SYSTOLIC BLOOD PRESSURE: 122 MMHG | BODY MASS INDEX: 33.81 KG/M2 | HEIGHT: 60 IN | WEIGHT: 172.2 LBS | DIASTOLIC BLOOD PRESSURE: 78 MMHG | OXYGEN SATURATION: 97 %

## 2021-11-01 DIAGNOSIS — E88.810 METABOLIC SYNDROME: ICD-10-CM

## 2021-11-01 DIAGNOSIS — E78.1 HYPERTRIGLYCERIDEMIA: ICD-10-CM

## 2021-11-01 DIAGNOSIS — E78.5 DYSLIPIDEMIA: Primary | ICD-10-CM

## 2021-11-01 PROCEDURE — 99214 OFFICE O/P EST MOD 30 MIN: CPT | Performed by: NURSE PRACTITIONER

## 2021-11-01 RX ORDER — TRANEXAMIC ACID 650 MG/1
TABLET ORAL
COMMUNITY
Start: 2021-10-13 | End: 2022-01-24 | Stop reason: ALTCHOICE

## 2021-11-01 ASSESSMENT — PATIENT HEALTH QUESTIONNAIRE - PHQ9
1. LITTLE INTEREST OR PLEASURE IN DOING THINGS: SEVERAL DAYS
SUM OF ALL RESPONSES TO PHQ9 QUESTIONS 1 AND 2: 2
SUM OF ALL RESPONSES TO PHQ9 QUESTIONS 1 AND 2: 2
CLINICAL INTERPRETATION OF PHQ9 SCORE: NO FURTHER SCREENING NEEDED
SUM OF ALL RESPONSES TO PHQ9 QUESTIONS 1 AND 2: 2
2. FEELING DOWN, DEPRESSED OR HOPELESS: SEVERAL DAYS
CLINICAL INTERPRETATION OF PHQ2 SCORE: NO FURTHER SCREENING NEEDED

## 2021-11-01 ASSESSMENT — PAIN SCALES - GENERAL: PAINLEVEL: 0

## 2021-11-03 RX ORDER — SEMAGLUTIDE 0.25 MG/.5ML
0.25 INJECTION, SOLUTION SUBCUTANEOUS
Qty: 2 ML | Refills: 0 | Status: SHIPPED | OUTPATIENT
Start: 2021-11-03 | End: 2021-11-12 | Stop reason: SDUPTHER

## 2021-11-05 ENCOUNTER — TELEPHONE (OUTPATIENT)
Dept: FAMILY MEDICINE | Age: 51
End: 2021-11-05

## 2021-11-05 DIAGNOSIS — E88.810 METABOLIC SYNDROME: Primary | ICD-10-CM

## 2021-11-05 DIAGNOSIS — E88.819 INSULIN RESISTANCE: ICD-10-CM

## 2021-11-12 RX ORDER — SEMAGLUTIDE 0.25 MG/.5ML
0.25 INJECTION, SOLUTION SUBCUTANEOUS
Qty: 2 ML | Refills: 0 | Status: SHIPPED | OUTPATIENT
Start: 2021-11-12 | End: 2022-01-24

## 2021-11-22 ENCOUNTER — CLINICAL ABSTRACT (OUTPATIENT)
Dept: OTHER | Age: 51
End: 2021-11-22

## 2021-11-23 ENCOUNTER — LAB SERVICES (OUTPATIENT)
Dept: LAB | Age: 51
End: 2021-11-23

## 2021-11-23 DIAGNOSIS — D50.9 IRON DEFICIENCY ANEMIA, UNSPECIFIED IRON DEFICIENCY ANEMIA TYPE: ICD-10-CM

## 2021-11-23 DIAGNOSIS — E78.5 DYSLIPIDEMIA: ICD-10-CM

## 2021-11-23 LAB
ALBUMIN SERPL-MCNC: 3.8 G/DL (ref 3.6–5.1)
ALP SERPL-CCNC: 99 UNITS/L (ref 45–117)
ALT SERPL-CCNC: 34 UNITS/L
AST SERPL-CCNC: 22 UNITS/L
BASOPHILS # BLD: 0 K/MCL (ref 0–0.3)
BASOPHILS NFR BLD: 1 %
BILIRUB CONJ SERPL-MCNC: <0.1 MG/DL (ref 0–0.2)
BILIRUB SERPL-MCNC: 0.4 MG/DL (ref 0.2–1)
CHOLEST SERPL-MCNC: 218 MG/DL
CHOLEST/HDLC SERPL: 6.4 {RATIO}
DEPRECATED RDW RBC: 51.8 FL (ref 39–50)
EOSINOPHIL # BLD: 0.2 K/MCL (ref 0–0.5)
EOSINOPHIL NFR BLD: 3 %
ERYTHROCYTE [DISTWIDTH] IN BLOOD: 15.7 % (ref 11–15)
FASTING DURATION TIME PATIENT: 12 HOURS (ref 0–999)
FASTING DURATION TIME PATIENT: 12 HOURS (ref 0–999)
HCT VFR BLD CALC: 38.6 % (ref 36–46.5)
HDLC SERPL-MCNC: 34 MG/DL
HGB BLD-MCNC: 12.7 G/DL (ref 12–15.5)
IMM GRANULOCYTES # BLD AUTO: 0 K/MCL (ref 0–0.2)
IMM GRANULOCYTES # BLD: 1 %
IRON SATN MFR SERPL: 18 % (ref 15–45)
IRON SERPL-MCNC: 79 MCG/DL (ref 50–170)
LDLC SERPL CALC-MCNC: ABNORMAL MG/DL
LDLC SERPL DIRECT ASSAY-MCNC: 103 MG/DL
LYMPHOCYTES # BLD: 2 K/MCL (ref 1–4)
LYMPHOCYTES NFR BLD: 31 %
MCH RBC QN AUTO: 30 PG (ref 26–34)
MCHC RBC AUTO-ENTMCNC: 32.9 G/DL (ref 32–36.5)
MCV RBC AUTO: 91.3 FL (ref 78–100)
MONOCYTES # BLD: 0.5 K/MCL (ref 0.3–0.9)
MONOCYTES NFR BLD: 7 %
NEUTROPHILS # BLD: 3.7 K/MCL (ref 1.8–7.7)
NEUTROPHILS NFR BLD: 57 %
NONHDLC SERPL-MCNC: 184 MG/DL
NRBC BLD MANUAL-RTO: 0 /100 WBC
PLATELET # BLD AUTO: 265 K/MCL (ref 140–450)
PROT SERPL-MCNC: 6.9 G/DL (ref 6.4–8.2)
RBC # BLD: 4.23 MIL/MCL (ref 4–5.2)
TIBC SERPL-MCNC: 434 MCG/DL (ref 250–450)
TRIGL SERPL-MCNC: 416 MG/DL
WBC # BLD: 6.4 K/MCL (ref 4.2–11)

## 2021-11-23 PROCEDURE — 83721 ASSAY OF BLOOD LIPOPROTEIN: CPT | Performed by: INTERNAL MEDICINE

## 2021-11-23 PROCEDURE — 85025 COMPLETE CBC W/AUTO DIFF WBC: CPT | Performed by: INTERNAL MEDICINE

## 2021-11-23 PROCEDURE — 83540 ASSAY OF IRON: CPT | Performed by: INTERNAL MEDICINE

## 2021-11-23 PROCEDURE — 80076 HEPATIC FUNCTION PANEL: CPT | Performed by: INTERNAL MEDICINE

## 2021-11-23 PROCEDURE — 80061 LIPID PANEL: CPT | Performed by: INTERNAL MEDICINE

## 2021-11-23 PROCEDURE — 83550 IRON BINDING TEST: CPT | Performed by: INTERNAL MEDICINE

## 2021-11-23 PROCEDURE — 36415 COLL VENOUS BLD VENIPUNCTURE: CPT | Performed by: INTERNAL MEDICINE

## 2021-11-24 ENCOUNTER — HOSPITAL ENCOUNTER (EMERGENCY)
Age: 51
Discharge: HOME | End: 2021-11-24
Attending: EMERGENCY MEDICINE

## 2021-11-24 ENCOUNTER — TELEPHONE (OUTPATIENT)
Dept: FAMILY MEDICINE | Age: 51
End: 2021-11-24

## 2021-11-24 ENCOUNTER — NURSE TRIAGE (OUTPATIENT)
Dept: FAMILY MEDICINE | Age: 51
End: 2021-11-24

## 2021-11-24 ENCOUNTER — APPOINTMENT (OUTPATIENT)
Dept: GENERAL RADIOLOGY | Age: 51
End: 2021-11-24
Attending: EMERGENCY MEDICINE

## 2021-11-24 VITALS
SYSTOLIC BLOOD PRESSURE: 146 MMHG | HEIGHT: 60 IN | OXYGEN SATURATION: 98 % | DIASTOLIC BLOOD PRESSURE: 88 MMHG | HEART RATE: 84 BPM | TEMPERATURE: 98.2 F | BODY MASS INDEX: 33.67 KG/M2 | WEIGHT: 171.52 LBS | RESPIRATION RATE: 15 BRPM

## 2021-11-24 DIAGNOSIS — E78.5 DYSLIPIDEMIA: Primary | ICD-10-CM

## 2021-11-24 DIAGNOSIS — R07.9 CHEST PAIN, UNSPECIFIED TYPE: Primary | ICD-10-CM

## 2021-11-24 LAB
ALBUMIN SERPL-MCNC: 3.9 G/DL (ref 3.6–5.1)
ALBUMIN/GLOB SERPL: 0.9 {RATIO} (ref 1–2.4)
ALP SERPL-CCNC: 101 UNITS/L (ref 45–117)
ALT SERPL-CCNC: 37 UNITS/L
ANION GAP SERPL CALC-SCNC: 14 MMOL/L (ref 10–20)
AST SERPL-CCNC: 34 UNITS/L
BASOPHILS # BLD: 0 K/MCL (ref 0–0.3)
BASOPHILS NFR BLD: 0 %
BILIRUB SERPL-MCNC: 0.3 MG/DL (ref 0.2–1)
BUN SERPL-MCNC: 10 MG/DL (ref 6–20)
BUN/CREAT SERPL: 15 (ref 7–25)
CALCIUM SERPL-MCNC: 8.9 MG/DL (ref 8.4–10.2)
CHLORIDE SERPL-SCNC: 101 MMOL/L (ref 98–107)
CO2 SERPL-SCNC: 28 MMOL/L (ref 21–32)
CREAT SERPL-MCNC: 0.65 MG/DL (ref 0.51–0.95)
DEPRECATED RDW RBC: 51.1 FL (ref 39–50)
EOSINOPHIL # BLD: 0.1 K/MCL (ref 0–0.5)
EOSINOPHIL NFR BLD: 1 %
ERYTHROCYTE [DISTWIDTH] IN BLOOD: 15.6 % (ref 11–15)
FASTING DURATION TIME PATIENT: ABNORMAL H
GFR SERPLBLD BASED ON 1.73 SQ M-ARVRAT: >90 ML/MIN
GLOBULIN SER-MCNC: 4.3 G/DL (ref 2–4)
GLUCOSE SERPL-MCNC: 89 MG/DL (ref 70–99)
HCT VFR BLD CALC: 37.4 % (ref 36–46.5)
HGB BLD-MCNC: 12.4 G/DL (ref 12–15.5)
IMM GRANULOCYTES # BLD AUTO: 0 K/MCL (ref 0–0.2)
IMM GRANULOCYTES # BLD: 1 %
LYMPHOCYTES # BLD: 0.7 K/MCL (ref 1–4)
LYMPHOCYTES NFR BLD: 13 %
MCH RBC QN AUTO: 29.6 PG (ref 26–34)
MCHC RBC AUTO-ENTMCNC: 33.2 G/DL (ref 32–36.5)
MCV RBC AUTO: 89.3 FL (ref 78–100)
MONOCYTES # BLD: 0.6 K/MCL (ref 0.3–0.9)
MONOCYTES NFR BLD: 11 %
NEUTROPHILS # BLD: 4 K/MCL (ref 1.8–7.7)
NEUTROPHILS NFR BLD: 74 %
NRBC BLD MANUAL-RTO: 0 /100 WBC
PLATELET # BLD AUTO: 205 K/MCL (ref 140–450)
POTASSIUM SERPL-SCNC: 4.4 MMOL/L (ref 3.4–5.1)
PROT SERPL-MCNC: 8.2 G/DL (ref 6.4–8.2)
RBC # BLD: 4.19 MIL/MCL (ref 4–5.2)
SODIUM SERPL-SCNC: 139 MMOL/L (ref 135–145)
TROPONIN I SERPL DL<=0.01 NG/ML-MCNC: 15 NG/L
WBC # BLD: 5.5 K/MCL (ref 4.2–11)

## 2021-11-24 PROCEDURE — 93005 ELECTROCARDIOGRAM TRACING: CPT | Performed by: EMERGENCY MEDICINE

## 2021-11-24 PROCEDURE — 71045 X-RAY EXAM CHEST 1 VIEW: CPT

## 2021-11-24 PROCEDURE — 71045 X-RAY EXAM CHEST 1 VIEW: CPT | Performed by: RADIOLOGY

## 2021-11-24 PROCEDURE — 10002800 HB RX 250 W HCPCS: Performed by: EMERGENCY MEDICINE

## 2021-11-24 PROCEDURE — 96374 THER/PROPH/DIAG INJ IV PUSH: CPT

## 2021-11-24 PROCEDURE — 93010 ELECTROCARDIOGRAM REPORT: CPT | Performed by: INTERNAL MEDICINE

## 2021-11-24 PROCEDURE — 80053 COMPREHEN METABOLIC PANEL: CPT | Performed by: EMERGENCY MEDICINE

## 2021-11-24 PROCEDURE — 99285 EMERGENCY DEPT VISIT HI MDM: CPT

## 2021-11-24 PROCEDURE — 99284 EMERGENCY DEPT VISIT MOD MDM: CPT | Performed by: EMERGENCY MEDICINE

## 2021-11-24 PROCEDURE — 84484 ASSAY OF TROPONIN QUANT: CPT | Performed by: EMERGENCY MEDICINE

## 2021-11-24 PROCEDURE — 36415 COLL VENOUS BLD VENIPUNCTURE: CPT | Performed by: EMERGENCY MEDICINE

## 2021-11-24 PROCEDURE — 85025 COMPLETE CBC W/AUTO DIFF WBC: CPT | Performed by: EMERGENCY MEDICINE

## 2021-11-24 PROCEDURE — 36415 COLL VENOUS BLD VENIPUNCTURE: CPT

## 2021-11-24 RX ORDER — ROSUVASTATIN CALCIUM 10 MG/1
10 TABLET, COATED ORAL AT BEDTIME
Qty: 90 TABLET | Refills: 0 | Status: SHIPPED | OUTPATIENT
Start: 2021-11-24 | End: 2022-02-01 | Stop reason: SDUPTHER

## 2021-11-24 RX ADMIN — KETOROLAC TROMETHAMINE 15 MG: 15 INJECTION, SOLUTION INTRAMUSCULAR; INTRAVENOUS at 16:26

## 2021-11-24 ASSESSMENT — HEART SCORE
EKG: NORMAL
RISK FACTORS: 1-2 RISK FACTORS
AGE: GREATER THAN 45 TO LESS THAN 65
HEART SCORE: 2
HISTORY: SLIGHTLY SUSPICIOUS
TROPONIN: EQUAL OR LESS THAN NORMAL LIMIT

## 2021-11-24 ASSESSMENT — PAIN SCALES - GENERAL
PAINLEVEL_OUTOF10: 5
PAINLEVEL_OUTOF10: 3
PAINLEVEL_OUTOF10: 5

## 2021-11-24 ASSESSMENT — PAIN DESCRIPTION - PAIN TYPE: TYPE: CHEST PAIN

## 2021-11-25 LAB
ATRIAL RATE (BPM): 85
P AXIS (DEGREES): 65
PR-INTERVAL (MSEC): 146
QRS-INTERVAL (MSEC): 76
QT-INTERVAL (MSEC): 358
QTC: 426
R AXIS (DEGREES): 9
RAINBOW EXTRA TUBES HOLD SPECIMEN: NORMAL
REPORT TEXT: NORMAL
T AXIS (DEGREES): 55
VENTRICULAR RATE EKG/MIN (BPM): 85

## 2021-12-02 ENCOUNTER — TELEPHONE (OUTPATIENT)
Dept: FAMILY MEDICINE | Age: 51
End: 2021-12-02

## 2021-12-08 DIAGNOSIS — E78.1 HYPERTRIGLYCERIDEMIA: ICD-10-CM

## 2021-12-08 DIAGNOSIS — E78.5 DYSLIPIDEMIA: ICD-10-CM

## 2021-12-08 DIAGNOSIS — E88.810 METABOLIC SYNDROME: ICD-10-CM

## 2021-12-11 ENCOUNTER — TELEPHONE (OUTPATIENT)
Dept: FAMILY MEDICINE | Age: 51
End: 2021-12-11

## 2021-12-13 ENCOUNTER — OUTPATIENT (OUTPATIENT)
Dept: OUTPATIENT SERVICES | Facility: HOSPITAL | Age: 51
LOS: 1 days | Discharge: HOME | End: 2021-12-13

## 2021-12-13 DIAGNOSIS — Z98.890 OTHER SPECIFIED POSTPROCEDURAL STATES: Chronic | ICD-10-CM

## 2021-12-13 DIAGNOSIS — C50.919 MALIGNANT NEOPLASM OF UNSPECIFIED SITE OF UNSPECIFIED FEMALE BREAST: ICD-10-CM

## 2021-12-13 DIAGNOSIS — Z98.51 TUBAL LIGATION STATUS: Chronic | ICD-10-CM

## 2021-12-15 ENCOUNTER — APPOINTMENT (OUTPATIENT)
Dept: HEMATOLOGY ONCOLOGY | Facility: CLINIC | Age: 51
End: 2021-12-15

## 2021-12-15 ENCOUNTER — APPOINTMENT (OUTPATIENT)
Dept: SURGICAL ONCOLOGY | Facility: CLINIC | Age: 51
End: 2021-12-15
Payer: COMMERCIAL

## 2021-12-20 ENCOUNTER — APPOINTMENT (OUTPATIENT)
Dept: FAMILY MEDICINE | Age: 51
End: 2021-12-20

## 2022-01-19 ENCOUNTER — APPOINTMENT (OUTPATIENT)
Dept: SURGICAL ONCOLOGY | Facility: CLINIC | Age: 52
End: 2022-01-19
Payer: COMMERCIAL

## 2022-01-19 VITALS
SYSTOLIC BLOOD PRESSURE: 170 MMHG | TEMPERATURE: 97.6 F | RESPIRATION RATE: 15 BRPM | HEIGHT: 65 IN | OXYGEN SATURATION: 98 % | DIASTOLIC BLOOD PRESSURE: 115 MMHG | WEIGHT: 217 LBS | HEART RATE: 123 BPM | BODY MASS INDEX: 36.15 KG/M2

## 2022-01-19 PROCEDURE — 99215 OFFICE O/P EST HI 40 MIN: CPT

## 2022-01-19 NOTE — ASSESSMENT
[FreeTextEntry1] : Stage I right breast cancer \par JOSE\par BI-RADS 2 imaging June 2021 \par Continue hormonal tx as per Dr. Li\par Continue yearly imaging June 2022 \par RTO 6 months

## 2022-01-19 NOTE — HISTORY OF PRESENT ILLNESS
[de-identified] : 52 y/o female presents for a 6 month follow up exam. \par She is s/p right breast lumpectomy post Lali  reflector placement, right axillary/sentinel node biopsy on 7/17/18. 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 (ER/DC+, Her-2neu-)\par Oncotype Dx: 16\par Genetic testing panel negative.\par \par She is s/p adjuvant radiation therapy 12/11/18.\par She started Tamoxifen 1/2019 which she is tolerating well.  She continues medical oncology surveillance with Dr. Li. \par \par MMG/US 6/2/21: No mammographic evidence of malignancy. Left breast cysts. Recommendation of mammography in 1 year. (BI-RADS 2)\par \par Mammogram (11/3/20) revealed bilateral stable probable benign calcifications. Follow-up diagnostic mammogram in 6 months is recommended at the time of annual exam. BI-RADS 3. \par \par Bilateral MMG/US 3/26/19: multiple bilateral cysts. No evidence of malignancy. BI-RADS 2.  \par US Pelvic & Transvaginal (7/13/19): Normal pelvic sonogram.\par \par Denies any previous breast biopsies. \par No family history of breast or ovarian cancer.\par She is Covid vaccinated w/ booster dose.

## 2022-01-19 NOTE — ADDENDUM
[FreeTextEntry1] : I, Mercedes Ndiaye, acted solely as a scribe for Dr. Luis Mazariegos on this date 01/19/2022.\par

## 2022-01-19 NOTE — PHYSICAL EXAM
[Normal] : supple, no neck mass and thyroid not enlarged [Normal Neck Lymph Nodes] : normal neck lymph nodes  [Normal Supraclavicular Lymph Nodes] : normal supraclavicular lymph nodes [Normal Groin Lymph Nodes] : normal groin lymph nodes [Normal Axillary Lymph Nodes] : normal axillary lymph nodes [Normal] : oriented to person, place and time, with appropriate affect [de-identified] : Right breast lumpectomy and axillary scars well healed. mild post radiation changes right breast.  no masses or adenopathy bilaterally.

## 2022-01-24 ENCOUNTER — TELEPHONE (OUTPATIENT)
Dept: FAMILY MEDICINE | Age: 52
End: 2022-01-24

## 2022-01-24 ENCOUNTER — OFFICE VISIT (OUTPATIENT)
Dept: FAMILY MEDICINE | Age: 52
End: 2022-01-24

## 2022-01-24 VITALS
OXYGEN SATURATION: 98 % | DIASTOLIC BLOOD PRESSURE: 70 MMHG | BODY MASS INDEX: 32.46 KG/M2 | WEIGHT: 166.2 LBS | HEART RATE: 84 BPM | SYSTOLIC BLOOD PRESSURE: 114 MMHG

## 2022-01-24 DIAGNOSIS — E88.810 METABOLIC SYNDROME: ICD-10-CM

## 2022-01-24 DIAGNOSIS — E88.819 INSULIN RESISTANCE: Primary | ICD-10-CM

## 2022-01-24 DIAGNOSIS — E78.1 HYPERTRIGLYCERIDEMIA: ICD-10-CM

## 2022-01-24 PROCEDURE — 99213 OFFICE O/P EST LOW 20 MIN: CPT | Performed by: NURSE PRACTITIONER

## 2022-01-24 RX ORDER — SEMAGLUTIDE 1.34 MG/ML
INJECTION, SOLUTION SUBCUTANEOUS
Qty: 3 ML | Refills: 0 | Status: SHIPPED | OUTPATIENT
Start: 2022-01-24 | End: 2022-04-27 | Stop reason: ALTCHOICE

## 2022-01-24 ASSESSMENT — PAIN SCALES - GENERAL: PAINLEVEL: 0

## 2022-01-24 ASSESSMENT — PATIENT HEALTH QUESTIONNAIRE - PHQ9
CLINICAL INTERPRETATION OF PHQ2 SCORE: NO FURTHER SCREENING NEEDED
SUM OF ALL RESPONSES TO PHQ9 QUESTIONS 1 AND 2: 0
SUM OF ALL RESPONSES TO PHQ9 QUESTIONS 1 AND 2: 0
2. FEELING DOWN, DEPRESSED OR HOPELESS: NOT AT ALL
1. LITTLE INTEREST OR PLEASURE IN DOING THINGS: NOT AT ALL

## 2022-01-27 ENCOUNTER — TELEPHONE (OUTPATIENT)
Dept: FAMILY MEDICINE | Age: 52
End: 2022-01-27

## 2022-01-28 ENCOUNTER — OUTPATIENT (OUTPATIENT)
Dept: OUTPATIENT SERVICES | Facility: HOSPITAL | Age: 52
LOS: 1 days | Discharge: HOME | End: 2022-01-28

## 2022-01-28 DIAGNOSIS — Z98.51 TUBAL LIGATION STATUS: Chronic | ICD-10-CM

## 2022-01-28 DIAGNOSIS — Z98.890 OTHER SPECIFIED POSTPROCEDURAL STATES: Chronic | ICD-10-CM

## 2022-01-28 DIAGNOSIS — C50.919 MALIGNANT NEOPLASM OF UNSPECIFIED SITE OF UNSPECIFIED FEMALE BREAST: ICD-10-CM

## 2022-01-29 ENCOUNTER — LAB SERVICES (OUTPATIENT)
Dept: LAB | Age: 52
End: 2022-01-29

## 2022-01-29 DIAGNOSIS — E78.5 DYSLIPIDEMIA: ICD-10-CM

## 2022-01-29 LAB
ALBUMIN SERPL-MCNC: 3.9 G/DL (ref 3.6–5.1)
ALP SERPL-CCNC: 81 UNITS/L (ref 45–117)
ALT SERPL-CCNC: 43 UNITS/L
AST SERPL-CCNC: 25 UNITS/L
BILIRUB CONJ SERPL-MCNC: <0.1 MG/DL (ref 0–0.2)
BILIRUB SERPL-MCNC: 0.4 MG/DL (ref 0.2–1)
CHOLEST SERPL-MCNC: 114 MG/DL
CHOLEST/HDLC SERPL: 3.3 {RATIO}
FASTING DURATION TIME PATIENT: 12 HOURS (ref 0–999)
HDLC SERPL-MCNC: 35 MG/DL
LDLC SERPL CALC-MCNC: 50 MG/DL
NONHDLC SERPL-MCNC: 79 MG/DL
PROT SERPL-MCNC: 7.4 G/DL (ref 6.4–8.2)
TRIGL SERPL-MCNC: 143 MG/DL

## 2022-01-29 PROCEDURE — 80061 LIPID PANEL: CPT | Performed by: INTERNAL MEDICINE

## 2022-01-29 PROCEDURE — 36415 COLL VENOUS BLD VENIPUNCTURE: CPT | Performed by: INTERNAL MEDICINE

## 2022-01-29 PROCEDURE — 80076 HEPATIC FUNCTION PANEL: CPT | Performed by: INTERNAL MEDICINE

## 2022-02-01 ENCOUNTER — TELEPHONE (OUTPATIENT)
Dept: FAMILY MEDICINE | Age: 52
End: 2022-02-01

## 2022-02-01 ENCOUNTER — OFFICE VISIT (OUTPATIENT)
Dept: FAMILY MEDICINE | Age: 52
End: 2022-02-01

## 2022-02-01 VITALS
OXYGEN SATURATION: 98 % | DIASTOLIC BLOOD PRESSURE: 66 MMHG | WEIGHT: 163 LBS | BODY MASS INDEX: 31.83 KG/M2 | SYSTOLIC BLOOD PRESSURE: 112 MMHG | HEART RATE: 68 BPM

## 2022-02-01 DIAGNOSIS — N92.1 MENOMETRORRHAGIA: ICD-10-CM

## 2022-02-01 DIAGNOSIS — Z00.00 ANNUAL PHYSICAL EXAM: Primary | ICD-10-CM

## 2022-02-01 DIAGNOSIS — Z12.11 COLON CANCER SCREENING: ICD-10-CM

## 2022-02-01 DIAGNOSIS — K21.9 GASTROESOPHAGEAL REFLUX DISEASE WITHOUT ESOPHAGITIS: ICD-10-CM

## 2022-02-01 DIAGNOSIS — E78.5 DYSLIPIDEMIA: Primary | ICD-10-CM

## 2022-02-01 PROCEDURE — 99396 PREV VISIT EST AGE 40-64: CPT | Performed by: FAMILY MEDICINE

## 2022-02-01 RX ORDER — ROSUVASTATIN CALCIUM 10 MG/1
10 TABLET, COATED ORAL AT BEDTIME
Qty: 90 TABLET | Refills: 1 | Status: SHIPPED | OUTPATIENT
Start: 2022-02-01 | End: 2022-11-02 | Stop reason: SDUPTHER

## 2022-02-01 RX ORDER — PANTOPRAZOLE SODIUM 20 MG/1
20 TABLET, DELAYED RELEASE ORAL DAILY
Qty: 90 TABLET | Refills: 1 | Status: SHIPPED | OUTPATIENT
Start: 2022-02-01 | End: 2022-08-30 | Stop reason: DRUGHIGH

## 2022-02-01 ASSESSMENT — PATIENT HEALTH QUESTIONNAIRE - PHQ9
1. LITTLE INTEREST OR PLEASURE IN DOING THINGS: NOT AT ALL
2. FEELING DOWN, DEPRESSED OR HOPELESS: NOT AT ALL
SUM OF ALL RESPONSES TO PHQ9 QUESTIONS 1 AND 2: 0
SUM OF ALL RESPONSES TO PHQ9 QUESTIONS 1 AND 2: 0
CLINICAL INTERPRETATION OF PHQ2 SCORE: NO FURTHER SCREENING NEEDED

## 2022-02-02 ENCOUNTER — APPOINTMENT (OUTPATIENT)
Dept: HEMATOLOGY ONCOLOGY | Facility: CLINIC | Age: 52
End: 2022-02-02
Payer: COMMERCIAL

## 2022-02-02 ENCOUNTER — PREP FOR CASE (OUTPATIENT)
Dept: GASTROENTEROLOGY | Age: 52
End: 2022-02-02

## 2022-02-02 ENCOUNTER — RESULT REVIEW (OUTPATIENT)
Age: 52
End: 2022-02-02

## 2022-02-02 VITALS
OXYGEN SATURATION: 96 % | DIASTOLIC BLOOD PRESSURE: 110 MMHG | HEART RATE: 111 BPM | SYSTOLIC BLOOD PRESSURE: 161 MMHG | RESPIRATION RATE: 16 BRPM | TEMPERATURE: 97.3 F | BODY MASS INDEX: 36.73 KG/M2 | HEIGHT: 65 IN | WEIGHT: 220.46 LBS

## 2022-02-02 DIAGNOSIS — Z12.11 SPECIAL SCREENING FOR MALIGNANT NEOPLASMS, COLON: Primary | ICD-10-CM

## 2022-02-02 DIAGNOSIS — Z01.812 PRE-PROCEDURAL LABORATORY EXAMINATION: ICD-10-CM

## 2022-02-02 LAB
BASOPHILS # BLD AUTO: 0.08 K/UL — SIGNIFICANT CHANGE UP (ref 0–0.2)
BASOPHILS NFR BLD AUTO: 0.6 % — SIGNIFICANT CHANGE UP (ref 0–2)
EOSINOPHIL # BLD AUTO: 0.27 K/UL — SIGNIFICANT CHANGE UP (ref 0–0.5)
EOSINOPHIL NFR BLD AUTO: 1.9 % — SIGNIFICANT CHANGE UP (ref 0–6)
HCT VFR BLD CALC: 47.7 % — HIGH (ref 34.5–45)
HGB BLD-MCNC: 15.3 G/DL — SIGNIFICANT CHANGE UP (ref 11.5–15.5)
IMM GRANULOCYTES NFR BLD AUTO: 2.5 % — HIGH (ref 0–1.5)
LYMPHOCYTES # BLD AUTO: 25.1 % — SIGNIFICANT CHANGE UP (ref 13–44)
LYMPHOCYTES # BLD AUTO: 3.63 K/UL — HIGH (ref 1–3.3)
MCHC RBC-ENTMCNC: 29.8 PG — SIGNIFICANT CHANGE UP (ref 27–34)
MCHC RBC-ENTMCNC: 32.1 G/DL — SIGNIFICANT CHANGE UP (ref 32–36)
MCV RBC AUTO: 92.8 FL — SIGNIFICANT CHANGE UP (ref 80–100)
MONOCYTES # BLD AUTO: 0.96 K/UL — HIGH (ref 0–0.9)
MONOCYTES NFR BLD AUTO: 6.6 % — SIGNIFICANT CHANGE UP (ref 2–14)
NEUTROPHILS # BLD AUTO: 9.14 K/UL — HIGH (ref 1.8–7.4)
NEUTROPHILS NFR BLD AUTO: 63.3 % — SIGNIFICANT CHANGE UP (ref 43–77)
NRBC # BLD: 0 /100 WBCS — SIGNIFICANT CHANGE UP (ref 0–0)
PLATELET # BLD AUTO: 260 K/UL — SIGNIFICANT CHANGE UP (ref 150–400)
RBC # BLD: 5.14 M/UL — SIGNIFICANT CHANGE UP (ref 3.8–5.2)
RBC # FLD: 13.4 % — SIGNIFICANT CHANGE UP (ref 10.3–14.5)
WBC # BLD: 14.44 K/UL — HIGH (ref 3.8–10.5)
WBC # FLD AUTO: 14.44 K/UL — HIGH (ref 3.8–10.5)

## 2022-02-02 PROCEDURE — 99214 OFFICE O/P EST MOD 30 MIN: CPT

## 2022-02-03 LAB
25(OH)D3 SERPL-MCNC: 26.3 NG/ML
ALBUMIN SERPL ELPH-MCNC: 4.1 G/DL
ALP BLD-CCNC: 84 U/L
ALT SERPL-CCNC: 13 U/L
ANION GAP SERPL CALC-SCNC: 16 MMOL/L
AST SERPL-CCNC: 17 U/L
BILIRUB SERPL-MCNC: 0.4 MG/DL
BUN SERPL-MCNC: 14 MG/DL
CALCIUM SERPL-MCNC: 9.2 MG/DL
CHLORIDE SERPL-SCNC: 101 MMOL/L
CO2 SERPL-SCNC: 23 MMOL/L
CREAT SERPL-MCNC: 0.78 MG/DL
ESTRADIOL SERPL-MCNC: 12 PG/ML
FSH SERPL-MCNC: 11.7 IU/L
GLUCOSE SERPL-MCNC: 251 MG/DL
POTASSIUM SERPL-SCNC: 3.7 MMOL/L
PROT SERPL-MCNC: 6.2 G/DL
SODIUM SERPL-SCNC: 140 MMOL/L

## 2022-02-04 ENCOUNTER — HOSPITAL ENCOUNTER (OUTPATIENT)
Age: 52
End: 2022-02-04
Attending: INTERNAL MEDICINE | Admitting: INTERNAL MEDICINE

## 2022-02-04 RX ORDER — POLYETHYLENE GLYCOL 3350, SODIUM CHLORIDE, SODIUM BICARBONATE, POTASSIUM CHLORIDE 420; 11.2; 5.72; 1.48 G/4L; G/4L; G/4L; G/4L
4000 POWDER, FOR SOLUTION ORAL ONCE
Qty: 4000 ML | Refills: 0 | Status: SHIPPED | OUTPATIENT
Start: 2022-02-04 | End: 2022-02-04

## 2022-02-05 NOTE — ASSESSMENT
[Curative] : Goals of care discussed with patient: Curative [FreeTextEntry1] : Ms. GIUSEPPE WYNN is a 51 yo female with stage 1A (B9pS4H2) invasive ductal carcinoma breast ER/FL +, HER2 neg s/p right lumpectomy and adjuvant radiation.  oncotype 16. Started on tamoxifen January 2019.  \par \par - Breast ca: She is tolerating tamoxifen very well without significant side effects. Reports good compliance. Plan to continue tamoxifen for 10 years. Will switch to AI after menopause. FSH low last visit, recheck today. GYN and opthal f/u annually due to risk of endometrial ca and cataracts respectively.  Up to date with Breast imaging.due 6/2022\par -  Mild hot flashes: 2/2 tamoxifen.improved  Advised to wear layers and use fan prn. Consider Effexor if get worse.\par - Wt gain:Life style modifications, healthy diet and exercise d/w her\par - Irregular periods: 2/2 perimenopause. Annual GYN f/u.  reminded to schedule this year.She will continue to use non-hormonal contraception. Recheck FSH/estradiol today if in menopause will switch to AI\par - Patient is aware of signs and symptoms of DVT/PE. Patient will report if she develops acute onset chest pain shortness of breath, leg swelling or calf pain. \par -Low serum Vit D - Vit D 26.3 instructed to take vit D suppls \par - She has HTN, well controlled. Continue ASA for CVA/VTE ppx. \par RTC 6 m. \par

## 2022-02-05 NOTE — HISTORY OF PRESENT ILLNESS
[Disease: _____________________] : Disease: [unfilled] [T: ___] : T[unfilled] [N: ___] : N[unfilled] [M: ___] : M[unfilled] [AJCC Stage: ____] : AJCC Stage: [unfilled] [de-identified] : Pt is a 46 yo female w/ pmhx HTN and DMII who initially presented with abnormal right mammogram and breast mass in 3/2018. US guided right breast core biopsy in 4/2018 showed infiltrating ductal carcinoma of 1 cm mass. She was seen by surgical oncology. She underwent right lumpectomy and Right sentinal node biopsy in 7/2018 which revealed 1.2 cm  moderately differentiated ductal carcinoma with [place] score 6/9. ER 80-90% positive, [place] 20-25% positive. Her-2 was equivocal with negative FISH. 1.2 cm mass. 3 sentinel nodes were negative. Stage 1A- [id number]. \par OncotypeDx is 16 \par RT delayed 2/2 insurance issues\par \par Completed Radiation therapy 12/11/18\par tamoxifen started 1/2019 [de-identified] : moderately differentiated ductal carcinoma  [de-identified] : ER 80-90%, [place] 20-25%, HER2 negative  [de-identified] : - genetic testing - 17 gene testing all came back negative. No further testing needed. [FreeTextEntry1] : tamoxifen started 1/2019 [de-identified] : Ms. GIUSEPPE WYNN is here for a follow up visit today for stage I right breast ca dx in 2018, s/p RT and on Tamoxifen since 2019. Tx care from clinic 2019\par She is tolerating tamoxifen well with tolerable hot flashes. Good compliance. She denies mood changes, vaginal dryness, SOB, chest pain, leg swelling or clotting issues. Her energy and appetite is good. She is overweight, discussed exercise and diet to loose wt. She works full time, has sedentary job but tries to remain active by walking. \par LMP date 2018,  no intermittent spotting, no vaginal discharge. She hasn’t been using contraception, was reminded to use condoms. Chemically premenopausal 2019\par [facility], doesn[facility]t remember name of GYN. Last seen 3/2021 reminded to schedule f/u gyn appointment, denies vaginal bleeding or discharge\par Breast imagin2021, birads 2\par Opthal:2021 wears \par She has HTN, controlled, she does take baby ASA

## 2022-02-05 NOTE — PHYSICAL EXAM
[Fully active, able to carry on all pre-disease performance without restriction] : Status 0 - Fully active, able to carry on all pre-disease performance without restriction [Obese] : obese [Normal] : affect appropriate [de-identified] : RIGHT BREAST lumpectomy and axillary scar, mild post RT changes

## 2022-02-11 ENCOUNTER — APPOINTMENT (OUTPATIENT)
Dept: LAB | Age: 52
End: 2022-02-11

## 2022-02-12 ENCOUNTER — LAB SERVICES (OUTPATIENT)
Dept: LAB | Age: 52
End: 2022-02-12

## 2022-02-12 DIAGNOSIS — Z86.32 HISTORY OF GESTATIONAL DIABETES: ICD-10-CM

## 2022-02-12 LAB — HBA1C MFR BLD: 5.1 % (ref 4.5–5.6)

## 2022-02-12 PROCEDURE — 83036 HEMOGLOBIN GLYCOSYLATED A1C: CPT | Performed by: INTERNAL MEDICINE

## 2022-02-12 PROCEDURE — 36415 COLL VENOUS BLD VENIPUNCTURE: CPT | Performed by: INTERNAL MEDICINE

## 2022-02-14 ENCOUNTER — OFFICE VISIT (OUTPATIENT)
Dept: ENDOCRINOLOGY | Age: 52
End: 2022-02-14

## 2022-02-14 VITALS
HEART RATE: 87 BPM | WEIGHT: 160.94 LBS | RESPIRATION RATE: 14 BRPM | BODY MASS INDEX: 31.43 KG/M2 | SYSTOLIC BLOOD PRESSURE: 124 MMHG | OXYGEN SATURATION: 98 % | DIASTOLIC BLOOD PRESSURE: 72 MMHG

## 2022-02-14 DIAGNOSIS — Z86.32 HISTORY OF GESTATIONAL DIABETES: ICD-10-CM

## 2022-02-14 DIAGNOSIS — E28.2 PCOS (POLYCYSTIC OVARIAN SYNDROME): Primary | ICD-10-CM

## 2022-02-14 PROCEDURE — 99214 OFFICE O/P EST MOD 30 MIN: CPT | Performed by: INTERNAL MEDICINE

## 2022-02-14 RX ORDER — DEXAMETHASONE 1 MG
1 TABLET ORAL ONCE
Qty: 1 TABLET | Refills: 0 | Status: SHIPPED | OUTPATIENT
Start: 2022-02-14 | End: 2022-02-14

## 2022-03-05 ENCOUNTER — INPATIENT (INPATIENT)
Facility: HOSPITAL | Age: 52
LOS: 0 days | Discharge: HOME | DRG: 420 | End: 2022-03-06
Attending: HOSPITALIST | Admitting: HOSPITALIST
Payer: COMMERCIAL

## 2022-03-05 VITALS
HEIGHT: 65 IN | OXYGEN SATURATION: 94 % | RESPIRATION RATE: 17 BRPM | DIASTOLIC BLOOD PRESSURE: 89 MMHG | HEART RATE: 126 BPM | TEMPERATURE: 99 F | SYSTOLIC BLOOD PRESSURE: 187 MMHG | WEIGHT: 210.1 LBS

## 2022-03-05 DIAGNOSIS — Z98.890 OTHER SPECIFIED POSTPROCEDURAL STATES: Chronic | ICD-10-CM

## 2022-03-05 DIAGNOSIS — M54.9 DORSALGIA, UNSPECIFIED: ICD-10-CM

## 2022-03-05 DIAGNOSIS — Z98.51 TUBAL LIGATION STATUS: Chronic | ICD-10-CM

## 2022-03-05 LAB
ALBUMIN SERPL ELPH-MCNC: 3.2 G/DL — LOW (ref 3.3–5)
ALP SERPL-CCNC: 77 U/L — SIGNIFICANT CHANGE UP (ref 40–120)
ALT FLD-CCNC: 22 U/L — SIGNIFICANT CHANGE UP (ref 12–78)
ANION GAP SERPL CALC-SCNC: 9 MMOL/L — SIGNIFICANT CHANGE UP (ref 5–17)
APPEARANCE UR: CLEAR — SIGNIFICANT CHANGE UP
APTT BLD: 28.7 SEC — SIGNIFICANT CHANGE UP (ref 27.5–35.5)
AST SERPL-CCNC: 15 U/L — SIGNIFICANT CHANGE UP (ref 15–37)
BASOPHILS # BLD AUTO: 0 K/UL — SIGNIFICANT CHANGE UP (ref 0–0.2)
BASOPHILS NFR BLD AUTO: 0 % — SIGNIFICANT CHANGE UP (ref 0–2)
BILIRUB SERPL-MCNC: 0.4 MG/DL — SIGNIFICANT CHANGE UP (ref 0.2–1.2)
BILIRUB UR-MCNC: NEGATIVE — SIGNIFICANT CHANGE UP
BUN SERPL-MCNC: 19 MG/DL — SIGNIFICANT CHANGE UP (ref 7–23)
CALCIUM SERPL-MCNC: 9.2 MG/DL — SIGNIFICANT CHANGE UP (ref 8.5–10.1)
CHLORIDE SERPL-SCNC: 109 MMOL/L — HIGH (ref 96–108)
CO2 SERPL-SCNC: 23 MMOL/L — SIGNIFICANT CHANGE UP (ref 22–31)
COLOR SPEC: YELLOW — SIGNIFICANT CHANGE UP
CREAT SERPL-MCNC: 0.93 MG/DL — SIGNIFICANT CHANGE UP (ref 0.5–1.3)
DIFF PNL FLD: ABNORMAL
EGFR: 74 ML/MIN/1.73M2 — SIGNIFICANT CHANGE UP
EOSINOPHIL # BLD AUTO: 0.14 K/UL — SIGNIFICANT CHANGE UP (ref 0–0.5)
EOSINOPHIL NFR BLD AUTO: 1 % — SIGNIFICANT CHANGE UP (ref 0–6)
GLUCOSE SERPL-MCNC: 225 MG/DL — HIGH (ref 70–99)
GLUCOSE UR QL: 250
HCG SERPL-ACNC: <1 MIU/ML — SIGNIFICANT CHANGE UP
HCT VFR BLD CALC: 45.7 % — HIGH (ref 34.5–45)
HGB BLD-MCNC: 15 G/DL — SIGNIFICANT CHANGE UP (ref 11.5–15.5)
INR BLD: 0.97 RATIO — SIGNIFICANT CHANGE UP (ref 0.88–1.16)
KETONES UR-MCNC: NEGATIVE — SIGNIFICANT CHANGE UP
LACTATE SERPL-SCNC: 4.6 MMOL/L — CRITICAL HIGH (ref 0.7–2)
LEUKOCYTE ESTERASE UR-ACNC: NEGATIVE — SIGNIFICANT CHANGE UP
LYMPHOCYTES # BLD AUTO: 1.25 K/UL — SIGNIFICANT CHANGE UP (ref 1–3.3)
LYMPHOCYTES # BLD AUTO: 9 % — LOW (ref 13–44)
MCHC RBC-ENTMCNC: 29.9 PG — SIGNIFICANT CHANGE UP (ref 27–34)
MCHC RBC-ENTMCNC: 32.8 GM/DL — SIGNIFICANT CHANGE UP (ref 32–36)
MCV RBC AUTO: 91 FL — SIGNIFICANT CHANGE UP (ref 80–100)
MONOCYTES # BLD AUTO: 0.28 K/UL — SIGNIFICANT CHANGE UP (ref 0–0.9)
MONOCYTES NFR BLD AUTO: 2 % — SIGNIFICANT CHANGE UP (ref 2–14)
NEUTROPHILS # BLD AUTO: 12.26 K/UL — HIGH (ref 1.8–7.4)
NEUTROPHILS NFR BLD AUTO: 88 % — HIGH (ref 43–77)
NITRITE UR-MCNC: NEGATIVE — SIGNIFICANT CHANGE UP
NRBC # BLD: SIGNIFICANT CHANGE UP /100 WBCS (ref 0–0)
PH UR: 5 — SIGNIFICANT CHANGE UP (ref 5–8)
PLATELET # BLD AUTO: 248 K/UL — SIGNIFICANT CHANGE UP (ref 150–400)
POTASSIUM SERPL-MCNC: 4.1 MMOL/L — SIGNIFICANT CHANGE UP (ref 3.5–5.3)
POTASSIUM SERPL-SCNC: 4.1 MMOL/L — SIGNIFICANT CHANGE UP (ref 3.5–5.3)
PROT SERPL-MCNC: 7.1 GM/DL — SIGNIFICANT CHANGE UP (ref 6–8.3)
PROT UR-MCNC: 30 MG/DL
PROTHROM AB SERPL-ACNC: 11.3 SEC — SIGNIFICANT CHANGE UP (ref 10.5–13.4)
RAPID RVP RESULT: SIGNIFICANT CHANGE UP
RBC # BLD: 5.02 M/UL — SIGNIFICANT CHANGE UP (ref 3.8–5.2)
RBC # FLD: 13.6 % — SIGNIFICANT CHANGE UP (ref 10.3–14.5)
SARS-COV-2 RNA SPEC QL NAA+PROBE: SIGNIFICANT CHANGE UP
SODIUM SERPL-SCNC: 141 MMOL/L — SIGNIFICANT CHANGE UP (ref 135–145)
SP GR SPEC: 1.01 — SIGNIFICANT CHANGE UP (ref 1.01–1.02)
UROBILINOGEN FLD QL: NEGATIVE — SIGNIFICANT CHANGE UP
WBC # BLD: 13.93 K/UL — HIGH (ref 3.8–10.5)
WBC # FLD AUTO: 13.93 K/UL — HIGH (ref 3.8–10.5)

## 2022-03-05 PROCEDURE — 80048 BASIC METABOLIC PNL TOTAL CA: CPT

## 2022-03-05 PROCEDURE — 85027 COMPLETE CBC AUTOMATED: CPT

## 2022-03-05 PROCEDURE — 36415 COLL VENOUS BLD VENIPUNCTURE: CPT

## 2022-03-05 PROCEDURE — G1004: CPT

## 2022-03-05 PROCEDURE — 99223 1ST HOSP IP/OBS HIGH 75: CPT

## 2022-03-05 PROCEDURE — 82962 GLUCOSE BLOOD TEST: CPT

## 2022-03-05 PROCEDURE — 71045 X-RAY EXAM CHEST 1 VIEW: CPT | Mod: 26

## 2022-03-05 PROCEDURE — 83605 ASSAY OF LACTIC ACID: CPT

## 2022-03-05 PROCEDURE — 74177 CT ABD & PELVIS W/CONTRAST: CPT | Mod: 26,MG

## 2022-03-05 PROCEDURE — 93010 ELECTROCARDIOGRAM REPORT: CPT

## 2022-03-05 PROCEDURE — 99285 EMERGENCY DEPT VISIT HI MDM: CPT

## 2022-03-05 PROCEDURE — 83036 HEMOGLOBIN GLYCOSYLATED A1C: CPT

## 2022-03-05 RX ORDER — ONDANSETRON 8 MG/1
4 TABLET, FILM COATED ORAL EVERY 8 HOURS
Refills: 0 | Status: DISCONTINUED | OUTPATIENT
Start: 2022-03-05 | End: 2022-03-06

## 2022-03-05 RX ORDER — SODIUM CHLORIDE 9 MG/ML
1750 INJECTION INTRAMUSCULAR; INTRAVENOUS; SUBCUTANEOUS ONCE
Refills: 0 | Status: COMPLETED | OUTPATIENT
Start: 2022-03-05 | End: 2022-03-05

## 2022-03-05 RX ORDER — DEXTROSE 50 % IN WATER 50 %
25 SYRINGE (ML) INTRAVENOUS ONCE
Refills: 0 | Status: DISCONTINUED | OUTPATIENT
Start: 2022-03-05 | End: 2022-03-06

## 2022-03-05 RX ORDER — LABETALOL HCL 100 MG
10 TABLET ORAL ONCE
Refills: 0 | Status: COMPLETED | OUTPATIENT
Start: 2022-03-05 | End: 2022-03-05

## 2022-03-05 RX ORDER — SODIUM CHLORIDE 9 MG/ML
1000 INJECTION, SOLUTION INTRAVENOUS
Refills: 0 | Status: DISCONTINUED | OUTPATIENT
Start: 2022-03-05 | End: 2022-03-06

## 2022-03-05 RX ORDER — INSULIN LISPRO 100/ML
VIAL (ML) SUBCUTANEOUS AT BEDTIME
Refills: 0 | Status: DISCONTINUED | OUTPATIENT
Start: 2022-03-05 | End: 2022-03-06

## 2022-03-05 RX ORDER — IBUPROFEN 200 MG
1 TABLET ORAL
Qty: 40 | Refills: 0
Start: 2022-03-05 | End: 2022-03-14

## 2022-03-05 RX ORDER — INSULIN LISPRO 100/ML
VIAL (ML) SUBCUTANEOUS
Refills: 0 | Status: DISCONTINUED | OUTPATIENT
Start: 2022-03-05 | End: 2022-03-06

## 2022-03-05 RX ORDER — DEXTROSE 50 % IN WATER 50 %
12.5 SYRINGE (ML) INTRAVENOUS ONCE
Refills: 0 | Status: DISCONTINUED | OUTPATIENT
Start: 2022-03-05 | End: 2022-03-06

## 2022-03-05 RX ORDER — ASPIRIN/CALCIUM CARB/MAGNESIUM 324 MG
81 TABLET ORAL DAILY
Refills: 0 | Status: DISCONTINUED | OUTPATIENT
Start: 2022-03-05 | End: 2022-03-06

## 2022-03-05 RX ORDER — LANOLIN ALCOHOL/MO/W.PET/CERES
3 CREAM (GRAM) TOPICAL AT BEDTIME
Refills: 0 | Status: DISCONTINUED | OUTPATIENT
Start: 2022-03-05 | End: 2022-03-06

## 2022-03-05 RX ORDER — GLUCAGON INJECTION, SOLUTION 0.5 MG/.1ML
1 INJECTION, SOLUTION SUBCUTANEOUS ONCE
Refills: 0 | Status: DISCONTINUED | OUTPATIENT
Start: 2022-03-05 | End: 2022-03-06

## 2022-03-05 RX ORDER — ACETAMINOPHEN 500 MG
650 TABLET ORAL EVERY 6 HOURS
Refills: 0 | Status: DISCONTINUED | OUTPATIENT
Start: 2022-03-05 | End: 2022-03-06

## 2022-03-05 RX ORDER — DEXTROSE 50 % IN WATER 50 %
15 SYRINGE (ML) INTRAVENOUS ONCE
Refills: 0 | Status: DISCONTINUED | OUTPATIENT
Start: 2022-03-05 | End: 2022-03-06

## 2022-03-05 RX ORDER — SODIUM CHLORIDE 9 MG/ML
1000 INJECTION INTRAMUSCULAR; INTRAVENOUS; SUBCUTANEOUS ONCE
Refills: 0 | Status: COMPLETED | OUTPATIENT
Start: 2022-03-05 | End: 2022-03-05

## 2022-03-05 RX ORDER — PIPERACILLIN AND TAZOBACTAM 4; .5 G/20ML; G/20ML
3.38 INJECTION, POWDER, LYOPHILIZED, FOR SOLUTION INTRAVENOUS ONCE
Refills: 0 | Status: COMPLETED | OUTPATIENT
Start: 2022-03-05 | End: 2022-03-05

## 2022-03-05 RX ORDER — MORPHINE SULFATE 50 MG/1
4 CAPSULE, EXTENDED RELEASE ORAL ONCE
Refills: 0 | Status: DISCONTINUED | OUTPATIENT
Start: 2022-03-05 | End: 2022-03-05

## 2022-03-05 RX ORDER — CYCLOBENZAPRINE HYDROCHLORIDE 10 MG/1
10 TABLET, FILM COATED ORAL THREE TIMES A DAY
Refills: 0 | Status: DISCONTINUED | OUTPATIENT
Start: 2022-03-05 | End: 2022-03-06

## 2022-03-05 RX ORDER — ATENOLOL 25 MG/1
100 TABLET ORAL DAILY
Refills: 0 | Status: DISCONTINUED | OUTPATIENT
Start: 2022-03-05 | End: 2022-03-06

## 2022-03-05 RX ORDER — ACETAMINOPHEN 500 MG
1000 TABLET ORAL ONCE
Refills: 0 | Status: COMPLETED | OUTPATIENT
Start: 2022-03-05 | End: 2022-03-05

## 2022-03-05 RX ORDER — KETOROLAC TROMETHAMINE 30 MG/ML
30 SYRINGE (ML) INJECTION ONCE
Refills: 0 | Status: DISCONTINUED | OUTPATIENT
Start: 2022-03-05 | End: 2022-03-05

## 2022-03-05 RX ORDER — SODIUM CHLORIDE 9 MG/ML
1000 INJECTION INTRAMUSCULAR; INTRAVENOUS; SUBCUTANEOUS
Refills: 0 | Status: DISCONTINUED | OUTPATIENT
Start: 2022-03-05 | End: 2022-03-06

## 2022-03-05 RX ORDER — CYCLOBENZAPRINE HYDROCHLORIDE 10 MG/1
1 TABLET, FILM COATED ORAL
Qty: 15 | Refills: 0
Start: 2022-03-05 | End: 2022-03-09

## 2022-03-05 RX ORDER — AMLODIPINE BESYLATE 2.5 MG/1
10 TABLET ORAL DAILY
Refills: 0 | Status: DISCONTINUED | OUTPATIENT
Start: 2022-03-05 | End: 2022-03-06

## 2022-03-05 RX ORDER — MORPHINE SULFATE 50 MG/1
2 CAPSULE, EXTENDED RELEASE ORAL
Refills: 0 | Status: DISCONTINUED | OUTPATIENT
Start: 2022-03-05 | End: 2022-03-06

## 2022-03-05 RX ORDER — TAMOXIFEN CITRATE 20 MG/1
20 TABLET, FILM COATED ORAL DAILY
Refills: 0 | Status: DISCONTINUED | OUTPATIENT
Start: 2022-03-05 | End: 2022-03-06

## 2022-03-05 RX ORDER — TRIAMTERENE/HYDROCHLOROTHIAZID 75 MG-50MG
1 TABLET ORAL DAILY
Refills: 0 | Status: DISCONTINUED | OUTPATIENT
Start: 2022-03-05 | End: 2022-03-06

## 2022-03-05 RX ORDER — TRAMADOL HYDROCHLORIDE 50 MG/1
50 TABLET ORAL EVERY 6 HOURS
Refills: 0 | Status: DISCONTINUED | OUTPATIENT
Start: 2022-03-05 | End: 2022-03-06

## 2022-03-05 RX ADMIN — Medication 81 MILLIGRAM(S): at 14:25

## 2022-03-05 RX ADMIN — Medication 1 TABLET(S): at 14:25

## 2022-03-05 RX ADMIN — AMLODIPINE BESYLATE 10 MILLIGRAM(S): 2.5 TABLET ORAL at 14:25

## 2022-03-05 RX ADMIN — SODIUM CHLORIDE 75 MILLILITER(S): 9 INJECTION INTRAMUSCULAR; INTRAVENOUS; SUBCUTANEOUS at 14:25

## 2022-03-05 RX ADMIN — Medication 1000 MILLIGRAM(S): at 02:15

## 2022-03-05 RX ADMIN — ATENOLOL 100 MILLIGRAM(S): 25 TABLET ORAL at 14:25

## 2022-03-05 RX ADMIN — MORPHINE SULFATE 4 MILLIGRAM(S): 50 CAPSULE, EXTENDED RELEASE ORAL at 02:15

## 2022-03-05 RX ADMIN — PIPERACILLIN AND TAZOBACTAM 200 GRAM(S): 4; .5 INJECTION, POWDER, LYOPHILIZED, FOR SOLUTION INTRAVENOUS at 02:15

## 2022-03-05 RX ADMIN — SODIUM CHLORIDE 1000 MILLILITER(S): 9 INJECTION INTRAMUSCULAR; INTRAVENOUS; SUBCUTANEOUS at 05:25

## 2022-03-05 RX ADMIN — Medication 1000 MILLIGRAM(S): at 03:06

## 2022-03-05 RX ADMIN — MORPHINE SULFATE 4 MILLIGRAM(S): 50 CAPSULE, EXTENDED RELEASE ORAL at 03:06

## 2022-03-05 RX ADMIN — TAMOXIFEN CITRATE 20 MILLIGRAM(S): 20 TABLET, FILM COATED ORAL at 14:26

## 2022-03-05 RX ADMIN — SODIUM CHLORIDE 1750 MILLILITER(S): 9 INJECTION INTRAMUSCULAR; INTRAVENOUS; SUBCUTANEOUS at 02:05

## 2022-03-05 RX ADMIN — Medication 650 MILLIGRAM(S): at 14:24

## 2022-03-05 RX ADMIN — Medication 30 MILLIGRAM(S): at 05:34

## 2022-03-05 RX ADMIN — Medication 10 MILLIGRAM(S): at 02:58

## 2022-03-05 NOTE — ED PROVIDER NOTE - PROGRESS NOTE DETAILS
Bert CARREON: received s/o from Dr. Wilson- patient with persistent lactic acidosis- patient with known breast cancer; on metformin for hx of DM; still with back pain; met SIRS upon arrival to ED; blood cultures had been sent; endorsed to Dr. Foley at this time for further work up.

## 2022-03-05 NOTE — H&P ADULT - ASSESSMENT
51 year old female with PMH of HTN, DM  here with right side back pain unable to ambulate    # intractable back pain - Degen changes noted on CT scan likely musculoskeletal  - admit for pain control and since pt cannot ambulate unsafe to dc home at this time   - IV morphine if severe pain recurs, tramadol and tylenol prn mod to mild pain  - flexeril tid prn   - PT eval    # leukocytosis and elevated lactate  - no evidence of sepsis, no fever or chills and non toxic appearing   - possible due to metformin. will hold for now.  - repeat lactate in AM     # HTN - controlled  - continue home meds    # DM - hold metformin given elevated lactate  - ISS    dvt proph - venodynes and ambulate with assistance

## 2022-03-05 NOTE — PATIENT PROFILE ADULT - VISION (WITH CORRECTIVE LENSES IF THE PATIENT USUALLY WEARS THEM):
Reading glasses only- non present with her on admit/Partially impaired: cannot see medication labels or newsprint, but can see obstacles in path, and the surrounding layout; can count fingers at arm's length

## 2022-03-05 NOTE — ED PROVIDER NOTE - OBJECTIVE STATEMENT
52 y/o female in ED with family c/o worsening right flank pain x 3 days.   states OTC pain meds not working.   pt states increase pain with movement.   states today unable to ambulate due to pain.   pt denies any fever, HA, cp, sob, n/v/d.   denies any urinary symptoms

## 2022-03-05 NOTE — ED ADULT NURSE REASSESSMENT NOTE - NS ED NURSE REASSESS COMMENT FT1
Pt noted to be hypertensive and tachycardic on multiple accounts. MD Wilson notified, recommendation by primary RN to administer antihypertensive. Labetalol ordered as per MD and administered as per eMAR. Will reassess VS for improvement.

## 2022-03-05 NOTE — ED ADULT NURSE NOTE - NSIMPLEMENTINTERV_GEN_ALL_ED
Implemented All Fall Risk Interventions:  [place] to call system. Call bell, personal items and telephone within reach. Instruct patient to call for assistance. Room bathroom lighting operational. Non-slip footwear when patient is off stretcher. Physically safe environment: no spills, clutter or unnecessary equipment. Stretcher in lowest position, wheels locked, appropriate side rails in place. Provide visual cue, wrist band, yellow gown, etc. Monitor gait and stability. Monitor for mental status changes and reorient to person, place, and time. Review medications for side effects contributing to fall risk. Reinforce activity limits and safety measures with patient and family.

## 2022-03-05 NOTE — ED ADULT TRIAGE NOTE - CHIEF COMPLAINT QUOTE
Pt. reports to ED for R sided back pain. Pt. bib son, pt. reports ambulatory, symptoms began 3 days ago, pain 10/10 radiates down RLE. denies injury/fall, denies numbness/tingling. OTC tylenol taken at home with no relief. PMH diabetes, HTN. non complaint with HTN medication.

## 2022-03-05 NOTE — H&P ADULT - NSHPPHYSICALEXAM_GEN_ALL_CORE
Vital Signs Last 24 Hrs  T(C): 37.2 (05 Mar 2022 09:00), Max: 37.3 (05 Mar 2022 01:10)  T(F): 98.9 (05 Mar 2022 09:00), Max: 99.2 (05 Mar 2022 01:10)  HR: 99 (05 Mar 2022 09:00) (95 - 126)  BP: 158/75 (05 Mar 2022 09:00) (148/75 - 189/89)  BP(mean): 96 (05 Mar 2022 09:00) (96 - 117)  RR: 18 (05 Mar 2022 09:00) (17 - 20)  SpO2: 97% (05 Mar 2022 09:00) (94% - 100%)    GEN: lying in bed, NAD  HEENT:   NC/AT, pupils equal and reactive, EOMI  CV:  +S1, +S2, RRR  RESP:   lungs clear to auscultation bilaterally, no wheeze, rales, rhonchi   BREAST:  not examined  GI:  abdomen soft, non-tender, non-distended, normoactive BS  MSK:   normal muscle tone, negative straight leg raise, no tenderness to palpation no cva tenderness  EXT:  no edema  NEURO:  AAOX3, no focal neurological deficits  SKIN:  no rashes

## 2022-03-05 NOTE — H&P ADULT - HISTORY OF PRESENT ILLNESS
pt is 50 yo female with PMH of HTN, DM, breast CA dx 2019 on tamoxifen, here with worsening right side back pain since thursday. pt wrks as a  and usually sits when she is working and denies any recent heavy lifting or trauma to the area.  pain has been getting worse and unable to control with po meds and unable to ambulate and came to the hospital.  pain rated 10/10 sharp in nature and does not radiate down her legs and now 2/10 after receiving pain meds.  pain aggravated by movement and alleviated only with pain meds given in ER. denies any fever, chills, hx of nephrolithiasis, dysuria, increased freq of urination.

## 2022-03-05 NOTE — H&P ADULT - NSHPSOCIALHISTORY_GEN_ALL_CORE
denshlomo tob/etoh/drugs  works as   tob - quit smoking last year smoked 2-3 cigs per day for 7 years

## 2022-03-05 NOTE — PATIENT PROFILE ADULT - FUNCTIONAL ASSESSMENT - BASIC MOBILITY 2.
"Subjective   Patient ID: Ana Xavier is a 79year old female who presents as a new patient to establish care with us. She recently moved here from [place] in September. She last saw her general practitioner in June in [place]. She has brought in her after visit summary from this visit which includes her established diagnoses and medication list.    She has been following with a cardiologist due to an extensive cardiac history including ""2 heart attacks\"" with stents placed and a history of CABG. She also has an established diagnosis of paroxysmal atrial fibrillation but is not currently on anticoagulation. She is not sure why. She was previously on a blood thinner (she believes it was Xarelto) for a few years and was taken off of it approximately 1 year ago by the cardiologist in [place]. She has a [id number] score of 4 based on her age, sex, history of hypertension, and history of vascular disease. She denies any history of bleeds in the past.  She will be provided a referral to a cardiologist so that she can establish care and inquire about whether she should be on anticoagulation for her history of paroxysmal atrial fibrillation. At times she will feel palpitations at rest (last occurrence was approximately a year ago). She denies chest pain at rest or with exertion, shortness of breath, and leg swelling. Blood pressure is elevated today in the clinic at 165/90. Repeat blood pressure is 136/76. She monitors her blood pressures periodically at home. She last checked her blood pressure at home approximately 2 weeks ago and reports that it was in the normal range. She takes metoprolol succinate and lisinopril for her  hypertension. She endorses compliance with her medications. She is requesting a refill of her metoprolol succinate 25 mg daily. She has no acute complaints today. She overall feels well. Depression screen was negative.   She denies recent sick contacts, dizziness, lightheadedness, " fevers, chills, congestion, sore throat, abdominal pain, nausea, vomiting, diarrhea, blood in the stool, and urinary symptoms. Past Medical History: paroxysmal atrial fibrillation, hypertension, hyperlipidemia, MI s/p stents and CABG, valvular heart disease,  nephrolithiasis s/p stone removal years ago (~2014), horseshoe kidney   Past Surgical History: CABG (unsure when) with no complications, urologic procedure for kidney stone removal, tubal ligation  Medications: aspirin 81mg daily, rosuvastatin 40mg daily, ezetimibe 10mg daily, metoprolol succinate 25mg daily, lisinopril 2.5mg daily. Also takes ascorbic acid 500mg, magnesium 250mg, omega-3 fatty acids, and a multivitamin. Family Hx: Father-CVA, MI, diabetes, hypertension, hyperlipidemia; Mother-CHF, MI; Sister-arthritis, diabetes; denies family hx of breast, cervical, colon, lung cancer   Social Hx: lives with  and son, home safety is not a concern, denies tobacco/alcohol/illicit drug use  Diet: No fast food. Lots of homecooked meals. Exercise: No formal exercise regimen. Walks a couple blocks every other day. Health Maintenance:  - Breast CA: no family history, last mammogram was ~3 years ago in [place], reportedly normal per patient, due for one, will provide referral today. - Colorectal CA: no family history, last colonoscopy was ~4 years ago in [place], reportedly normal per patient, no polyps were found, due for one in 6 years. Denies blood in the stool and other GI symptoms.   - Cervical CA: no family history, last Pap smear was ~3 years ago and was reportedly normal, no longer needs to be screened given her age and no history of abnormal Pap smears in the past.   - DEXA: never done, will provide referral for DEXA scan.   - Vision: needs to see an optometrist, last appointment was ~4 years ago   Immunizations:  - Herpes zoster (Shingrix): has not received, plans to get it, advised to confirm with insurance the cost.   - Pneumococcal: has received in [place], asked to obtain medical records from [place] and have them faxed to our clinic.   - Td: believes she last got it ~10 years ago but not sure, asked to obtain medical records from [place] and have them faxed to our clinic, will confirm with insurance the cost and plan to obtain if she is due for one.   - Flu: will provide influenza vaccine today   - COVID-19: received both doses of the [address] vaccine and booster     Chief Complaint   Patient presents with   â¢ Annual Exam   â¢ Medication Refill   â¢ Referral     needs a new cardiologist, audiologist      HPI  Ángela Lainez has no past medical history on file. Ángela Lainez has Benign essential HTN; Hyperlipidemia; Atrial fibrillation (CMS/HCC); Family history of MI (myocardial infarction); Personal history of MI (myocardial infarction); and Hx of CABG on their problem list.  Ángela Lainez has no past surgical history on file. Her family history is not on file. Ángela Lainez reports that she has never smoked. She has never used smokeless tobacco. She reports previous alcohol use. Ángela Lainez has a current medication list which includes the following prescription(s): ascorbic acid, aspirin, ezetimibe, lisinopril, magnesium, vitamin - therapeutic multivitamins w/minerals, omega-3 fatty acids, and metoprolol succinate. Ángela Lainez   Current Outpatient Medications   Medication Sig Dispense Refill   â¢ ascorbic acid (VITAMIN C) 500 MG tablet Take 500 mg by mouth daily. â¢ aspirin 81 MG chewable tablet Chew 81 mg by mouth daily. â¢ ezetimibe (ZETIA) 10 MG tablet Take 10 mg by mouth daily. â¢ lisinopril (ZESTRIL) 2.5 MG tablet Take 2.5 mg by mouth daily. â¢ magnesium 250 MG tablet      â¢ Multiple Vitamins-Minerals (vitamin - therapeutic multivitamins w/minerals) tablet      â¢ Omega-3 Fatty Acids (OMEGA 3 500 PO)      â¢ metoPROLOL succinate (TOPROL-XL) 25 MG 24 hr tablet Take 1 tablet by mouth daily.  90 tablet 3     No current facility-administered medications for this visit. Judith Hilario is allergic to penicillins. Review of Systems   Constitutional: Negative for chills, fatigue and fever. HENT: Negative for congestion and sore throat. Eyes: Negative for visual disturbance. Respiratory: Negative for cough and shortness of breath. Cardiovascular: Negative for chest pain, palpitations and leg swelling. Gastrointestinal: Negative for abdominal pain, blood in stool, constipation, diarrhea, nausea and vomiting. Genitourinary: Negative. Musculoskeletal: Negative. Neurological: Negative for dizziness, light-headedness and headaches. Psychiatric/Behavioral: Negative for dysphoric mood. The patient is not nervous/anxious. Objective   Physical Exam  Constitutional:       General: She is not in acute distress. Appearance: Normal appearance. HENT:      Head: Normocephalic and atraumatic. Right Ear: Tympanic membrane, ear canal and external ear normal.      Left Ear: Tympanic membrane, ear canal and external ear normal.      Nose: Nose normal.      Mouth/Throat:      Mouth: Mucous membranes are moist.      Pharynx: Oropharynx is clear. Eyes:      Extraocular Movements: Extraocular movements intact. Conjunctiva/sclera: Conjunctivae normal.      Pupils: Pupils are equal, round, and reactive to light. Cardiovascular:      Rate and Rhythm: Normal rate and regular rhythm. Pulses: Normal pulses. Heart sounds: Normal heart sounds. Pulmonary:      Effort: Pulmonary effort is normal. No respiratory distress. Breath sounds: Normal breath sounds. Abdominal:      General: Abdomen is flat. Bowel sounds are normal.      Palpations: Abdomen is soft. Musculoskeletal:         General: Normal range of motion. Cervical back: Normal range of motion and neck supple. Right lower leg: No edema. Left lower leg: No edema. Skin:     General: Skin is warm and dry.       Capillary Refill: Capillary refill takes less than 2 seconds. Neurological:      General: No focal deficit present. Mental Status: She is alert and oriented to person, place, and time. Mental status is at baseline.    Psychiatric:         Mood and Affect: Mood normal.         Behavior: Behavior normal.        Assessment   Problem List Items Addressed This Visit        Cardiac and Vasculature    Hyperlipidemia    Relevant Medications    aspirin 81 MG chewable tablet    ezetimibe (ZETIA) 10 MG tablet    lisinopril (ZESTRIL) 2.5 MG tablet    metoPROLOL succinate (TOPROL-XL) 25 MG 24 hr tablet    Other Relevant Orders    SERVICE TO CARDIOLOGY    Family history of MI (myocardial infarction)    Relevant Orders    SERVICE TO CARDIOLOGY    Personal history of MI (myocardial infarction)    Relevant Orders    SERVICE TO CARDIOLOGY    Hx of CABG    Relevant Orders    SERVICE TO CARDIOLOGY      Other Visit Diagnoses     Need for diphtheria-tetanus-pertussis (Tdap) vaccine    -  Primary    Benign essential hypertension        Relevant Medications    aspirin 81 MG chewable tablet    ezetimibe (ZETIA) 10 MG tablet    lisinopril (ZESTRIL) 2.5 MG tablet    metoPROLOL succinate (TOPROL-XL) 25 MG 24 hr tablet    Other Relevant Orders    SERVICE TO CARDIOLOGY    Encounter for screening mammogram for malignant neoplasm of breast        Relevant Orders    MAMMO SCREENING BILATERAL W GRACY    Screening for cervical cancer        Screening for osteoporosis        Relevant Orders    BD DEXA AXIAL SKELETON    Routine adult health maintenance        Relevant Orders    COMPREHENSIVE METABOLIC PANEL    CBC WITH DIFFERENTIAL    THYROID STIMULATING HORMONE REFLEX    LIPID PANEL WITH REFLEX    HEPATITIS C ANTIBODY WITH REFLEX    Need for influenza vaccination        Relevant Orders    INFLUENZA QUADRIVALENT HIGH DOSE PRES FREE 0.7 ML VACC,IM (Completed)        A referral has been provided to cardiology due to her extensive cardiac history including history of MI s/p stents and CABG, hypertension, hyperlipidemia, and paroxysmal atrial fibrillation. Patient was encouraged to establish care with a cardiologist and inquire about whether she should be on anticoagulation for her history of paroxysmal atrial fibrillation. It is unclear why her anticoagulation was stopped, she denies a history of prior bleeding. Her blood pressure was high today in the clinic. Patient was instructed to record her blood pressures daily at home for the next week and notify our clinic if her readings are elevated. Routine fasting labs (CMP, CBC, TSH, lipid panel, hepatitis C screening) have been ordered. We will follow up and inform the patient of the results. A referral has been provided for mammogram for breast cancer screening. A referral has been provided for DEXA scan. A refill of metoprolol was provided. Patient was asked to request her medical records from [place] (including recent labs, echo, immunizations, etc) and have them faxed to our clinic if possible. Patient was asked to confirm the cost of the shingles and tetanus vaccines with her insurance and follow-up. Discussed with Dr. Daryn Gibson.     Wan Astorga DO  Internal Medicine, PGY-1 4 = No assist / stand by assistance

## 2022-03-05 NOTE — PATIENT PROFILE ADULT - FALL HARM RISK - UNIVERSAL INTERVENTIONS
Bed in lowest position, wheels locked, appropriate side rails in place/Call bell, personal items and telephone in reach/Instruct patient to call for assistance before getting out of bed or chair/Non-slip footwear when patient is out of bed/[place] to call system/Physically safe environment - no spills, clutter or unnecessary equipment/Purposeful Proactive Rounding/Room/bathroom lighting operational, light cord in reach

## 2022-03-05 NOTE — ED ADULT NURSE NOTE - OBJECTIVE STATEMENT
presents to ED accompanied by son. c/o severe right sided LBP extending down RLE pain onset 3 days ago. denies numbness or parasthenia of RLE. pain 10/10. pt self medicated with Tylenol at home but has achieved no relief. pmhx DM, HTN, breast CA (right arm restriction). pt unable to move without severe discomfort, affect tearful. pt helped to reposition in attempt to achieve maximum comfort.

## 2022-03-05 NOTE — ED PROVIDER NOTE - CARE PLAN
1 Principal Discharge DX:	Back pain   Principal Discharge DX:	Intractable back pain  Secondary Diagnosis:	Breast cancer  Secondary Diagnosis:	SIRS (systemic inflammatory response syndrome)

## 2022-03-05 NOTE — H&P ADULT - NSHPLABSRESULTS_GEN_ALL_CORE
15.0   13.93 )-----------( 248      ( 05 Mar 2022 02:01 )             45.7     03-    141  |  109<H>  |  19  ----------------------------<  225<H>  4.1   |  23  |  0.93    Ca    9.2      05 Mar 2022 02:01    TPro  7.1  /  Alb  3.2<L>  /  TBili  0.4  /  DBili  x   /  AST  15  /  ALT  22  /  AlkPhos  77  03-05        LIVER FUNCTIONS - ( 05 Mar 2022 02:01 )  Alb: 3.2 g/dL / Pro: 7.1 gm/dL / ALK PHOS: 77 U/L / ALT: 22 U/L / AST: 15 U/L / GGT: x           PT/INR - ( 05 Mar 2022 02:01 )   PT: 11.3 sec;   INR: 0.97 ratio         PTT - ( 05 Mar 2022 02:01 )  PTT:28.7 sec  Urinalysis Basic - ( 05 Mar 2022 02:02 )    Color: Yellow / Appearance: Clear / S.010 / pH: x  Gluc: x / Ketone: Negative  / Bili: Negative / Urobili: Negative   Blood: x / Protein: 30 mg/dL / Nitrite: Negative   Leuk Esterase: Negative / RBC: Negative /HPF / WBC Negative   Sq Epi: x / Non Sq Epi: Occasional / Bacteria: Few        Lactate, Blood: 4.2 mmol/L ( @ 04:52)  Lactate, Blood: 4.6 mmol/L ( @ 02:01)      < from: CT Abdomen and Pelvis w/ IV Cont (22 @ 03:21) >      IMPRESSION:    No evidence of small bowel obstruction or active bowel inflammation.   Normal appendix.    No hydronephrosis.    Degenerative changes of the lumbar spine. No acute fracture.    Fibroid uterus.    < end of copied text >

## 2022-03-06 ENCOUNTER — TRANSCRIPTION ENCOUNTER (OUTPATIENT)
Age: 52
End: 2022-03-06

## 2022-03-06 VITALS
OXYGEN SATURATION: 96 % | RESPIRATION RATE: 19 BRPM | HEART RATE: 68 BPM | SYSTOLIC BLOOD PRESSURE: 153 MMHG | TEMPERATURE: 98 F | DIASTOLIC BLOOD PRESSURE: 84 MMHG

## 2022-03-06 LAB
A1C WITH ESTIMATED AVERAGE GLUCOSE RESULT: 7.9 % — HIGH (ref 4–5.6)
ANION GAP SERPL CALC-SCNC: 4 MMOL/L — LOW (ref 5–17)
BUN SERPL-MCNC: 17 MG/DL — SIGNIFICANT CHANGE UP (ref 7–23)
CALCIUM SERPL-MCNC: 8.5 MG/DL — SIGNIFICANT CHANGE UP (ref 8.5–10.1)
CHLORIDE SERPL-SCNC: 111 MMOL/L — HIGH (ref 96–108)
CO2 SERPL-SCNC: 27 MMOL/L — SIGNIFICANT CHANGE UP (ref 22–31)
CREAT SERPL-MCNC: 0.6 MG/DL — SIGNIFICANT CHANGE UP (ref 0.5–1.3)
CULTURE RESULTS: SIGNIFICANT CHANGE UP
EGFR: 109 ML/MIN/1.73M2 — SIGNIFICANT CHANGE UP
ESTIMATED AVERAGE GLUCOSE: 180 MG/DL — HIGH (ref 68–114)
GLUCOSE SERPL-MCNC: 140 MG/DL — HIGH (ref 70–99)
HCT VFR BLD CALC: 41.4 % — SIGNIFICANT CHANGE UP (ref 34.5–45)
HGB BLD-MCNC: 13.5 G/DL — SIGNIFICANT CHANGE UP (ref 11.5–15.5)
LACTATE SERPL-SCNC: 1.5 MMOL/L — SIGNIFICANT CHANGE UP (ref 0.7–2)
MCHC RBC-ENTMCNC: 30.2 PG — SIGNIFICANT CHANGE UP (ref 27–34)
MCHC RBC-ENTMCNC: 32.6 GM/DL — SIGNIFICANT CHANGE UP (ref 32–36)
MCV RBC AUTO: 92.6 FL — SIGNIFICANT CHANGE UP (ref 80–100)
PLATELET # BLD AUTO: 234 K/UL — SIGNIFICANT CHANGE UP (ref 150–400)
POTASSIUM SERPL-MCNC: 3.9 MMOL/L — SIGNIFICANT CHANGE UP (ref 3.5–5.3)
POTASSIUM SERPL-SCNC: 3.9 MMOL/L — SIGNIFICANT CHANGE UP (ref 3.5–5.3)
RBC # BLD: 4.47 M/UL — SIGNIFICANT CHANGE UP (ref 3.8–5.2)
RBC # FLD: 14 % — SIGNIFICANT CHANGE UP (ref 10.3–14.5)
SODIUM SERPL-SCNC: 142 MMOL/L — SIGNIFICANT CHANGE UP (ref 135–145)
SPECIMEN SOURCE: SIGNIFICANT CHANGE UP
WBC # BLD: 13.78 K/UL — HIGH (ref 3.8–10.5)
WBC # FLD AUTO: 13.78 K/UL — HIGH (ref 3.8–10.5)

## 2022-03-06 PROCEDURE — 99239 HOSP IP/OBS DSCHRG MGMT >30: CPT

## 2022-03-06 RX ORDER — ACETAMINOPHEN 500 MG
2 TABLET ORAL
Qty: 0 | Refills: 0 | DISCHARGE
Start: 2022-03-06

## 2022-03-06 RX ORDER — METFORMIN HYDROCHLORIDE 850 MG/1
1 TABLET ORAL
Qty: 0 | Refills: 0 | DISCHARGE

## 2022-03-06 RX ORDER — GLYBURIDE 5 MG
1 TABLET ORAL
Qty: 30 | Refills: 0
Start: 2022-03-06 | End: 2022-04-04

## 2022-03-06 RX ADMIN — TAMOXIFEN CITRATE 20 MILLIGRAM(S): 20 TABLET, FILM COATED ORAL at 09:41

## 2022-03-06 RX ADMIN — ATENOLOL 100 MILLIGRAM(S): 25 TABLET ORAL at 09:41

## 2022-03-06 RX ADMIN — AMLODIPINE BESYLATE 10 MILLIGRAM(S): 2.5 TABLET ORAL at 09:41

## 2022-03-06 RX ADMIN — Medication 81 MILLIGRAM(S): at 09:41

## 2022-03-06 RX ADMIN — Medication 1 TABLET(S): at 09:41

## 2022-03-06 RX ADMIN — SODIUM CHLORIDE 75 MILLILITER(S): 9 INJECTION INTRAMUSCULAR; INTRAVENOUS; SUBCUTANEOUS at 01:23

## 2022-03-06 NOTE — DISCHARGE NOTE PROVIDER - NSDCHC_MEDRECSTATUS_GEN_ALL_CORE
Verified Results  BASIC METABOLIC PNL [id number] Freddie Broderick   [Sep 1, 2018 9:43AM BRADY GUIDRY]  kidney function stable- can continue with CT scan as planned     Test Name Result Flag Reference   SODIUM 139 mmol/L  135-145   POTASSIUM 4.3 mmol/L  3.4-5.1   CHLORIDE 100 mmol/L     CARBON DIOXIDE 27 mmol/L  21-32   ANION GAP 16 mmol/L  10-20   GLUCOSE 122 mg/dl H 65-99   BUN 16 mg/dl  6-20   CREATININE 1.06 mg/dl  0.67-1.17   GFR EST.  AMER 79     eGFR 60 - 89 mL/min/1.73m2 = Mild decrease in kidney function. GFR EST. NONAFRI AMER 68     eGFR 60 - 89 mL/min/1.73m2 = Mild decrease in kidney function.    BUN/CREATININE RATIO 15  7-25   CALCIUM 9.8 mg/dl  8.4-10.2   FASTING STATUS NOT FASTING hrs         Message   kidney function stable- can continue with CT scan as planned
Admission Reconciliation is Completed  Discharge Reconciliation is Completed

## 2022-03-06 NOTE — DISCHARGE NOTE PROVIDER - NSDCCPCAREPLAN_GEN_ALL_CORE_FT
PRINCIPAL DISCHARGE DIAGNOSIS  Diagnosis: Intractable back pain  Assessment and Plan of Treatment: due to lactic acidosis due to metformin  stop metformin  return to the ER if severe pain occurs again or if fever or chills         SECONDARY DISCHARGE DIAGNOSES  Diagnosis: Breast cancer  Assessment and Plan of Treatment:     Diagnosis: SIRS (systemic inflammatory response syndrome)  Assessment and Plan of Treatment:

## 2022-03-06 NOTE — DISCHARGE NOTE NURSING/CASE MANAGEMENT/SOCIAL WORK - PATIENT PORTAL LINK FT
You can access the FollowMyHealth Patient Portal offered by [facility] by registering at the following website: [email or web address]. By joining [facility]’s FollowMyHealth portal, you will also be able to view your health information using other applications (apps) compatible with our system.

## 2022-03-06 NOTE — DISCHARGE NOTE NURSING/CASE MANAGEMENT/SOCIAL WORK - NSDCPEFALRISK_GEN_ALL_CORE
For information on Fall & Injury Prevention, visit: [email or web address] OR  [email or web address] OR  https://www.cdc.gov/steadi/patient.html

## 2022-03-06 NOTE — DISCHARGE NOTE PROVIDER - NSDCMRMEDTOKEN_GEN_ALL_CORE_FT
acetaminophen 325 mg oral tablet: 2 tab(s) orally every 6 hours, As needed, Mild Pain (1 - 3)  amLODIPine 10 mg oral tablet: 1 tab(s) orally once a day  aspirin 81 mg oral delayed release tablet: 1 tab(s) orally once a day  atenolol 100 mg oral tablet: 1 tab(s) orally once a day  cyclobenzaprine 5 mg oral tablet: 1 tab(s) orally 3 times a day, As Needed  glyBURIDE 2.5 mg oral tablet: 1 tab(s) orally once a day   tamoxifen 20 mg oral tablet: 1 tab(s) orally once a day  triamterene-hydrochlorothiazide 37.5 mg-25 mg oral capsule: 1 cap(s) orally once a day

## 2022-03-06 NOTE — DISCHARGE NOTE PROVIDER - CARE PROVIDER_API CALL
Co, Lyle HAYES)  Internal Medicine  [address] [place] [address]  Phone: [phone]  Fax: [phone]  Follow Up Time: 1-3 days

## 2022-03-06 NOTE — DISCHARGE NOTE PROVIDER - HOSPITAL COURSE
Outpatient Providers	dr. Timmons   used - DIMAS Canseco both days  Reason for Admission: back pain  History of Present Illness:   pt is 50 yo female with PMH of HTN, DM, breast CA dx 2019 on tamoxifen, here with worsening right side back pain since thursday. pt wrks as a  and usually sits when she is working and denies any recent heavy lifting or trauma to the area.  pain has been getting worse and unable to control with po meds and unable to ambulate and came to the hospital.  pain rated 10/10 sharp in nature and does not radiate down her legs and now 2/10 after receiving pain meds.  pain aggravated by movement and alleviated only with pain meds given in ER. denies any fever, chills, hx of nephrolithiasis, dysuria, increased freq of urination.    pt admitted for lactic acidosis likely secondary to metformin as no evidence of sepsis. pt remained HD stable and lactate resolved once metformin stopped.  its possible underlying leukocytosis maybe due to self limited viral illness but may have been reactive as pt non toxic appearing with no obvious etiology on hx and on imaging.  pt told to avoid metformin and has been entered as intolerance. pt now has no pain and ambulating without assistance.     Vital Signs Last 24 Hrs  T(C): 36.6 (06 Mar 2022 07:52), Max: 37.2 (05 Mar 2022 16:26)  T(F): 97.9 (06 Mar 2022 07:52), Max: 98.9 (05 Mar 2022 16:26)  HR: 68 (06 Mar 2022 07:52) (68 - 96)  BP: 153/84 (06 Mar 2022 07:52) (143/66 - 160/82)  BP(mean): --  RR: 19 (06 Mar 2022 07:52) (18 - 19)  SpO2: 96% (06 Mar 2022 07:52) (94% - 99%)    resp cta b/l  cvs - + s1s2 RR  abd - soft + bs nt/nd      < from: CT Abdomen and Pelvis w/ IV Cont (03.05.22 @ 03:21) >    No evidence of small bowel obstruction or active bowel inflammation.   Normal appendix.  No hydronephrosis.  Degenerative changes of the lumbar spine. No acute fracture.  Fibroid uterus.      # intractable back pain - Degen changes noted on CT scan likely musculoskeletal in setting of lactic acidosis  ambulating well without assistance    # leukocytosis and elevated lactate  - no evidence of sepsis, no fever or chills and non toxic appearing   - possible due to metformin. will stop and pt instructed not to resume  - dc on gyburide   - outpt     # HTN - controlled  - continue home meds    # DM - hold metformin given elevated lactate  - ISS    dvt proph - venodynes and ambulate with assistance

## 2022-03-11 DIAGNOSIS — M54.9 DORSALGIA, UNSPECIFIED: ICD-10-CM

## 2022-03-11 DIAGNOSIS — E11.10 TYPE 2 DIABETES MELLITUS WITH KETOACIDOSIS WITHOUT COMA: ICD-10-CM

## 2022-03-11 DIAGNOSIS — C50.911 MALIGNANT NEOPLASM OF UNSPECIFIED SITE OF RIGHT FEMALE BREAST: ICD-10-CM

## 2022-03-11 DIAGNOSIS — I10 ESSENTIAL (PRIMARY) HYPERTENSION: ICD-10-CM

## 2022-03-11 DIAGNOSIS — Z87.891 PERSONAL HISTORY OF NICOTINE DEPENDENCE: ICD-10-CM

## 2022-03-11 DIAGNOSIS — R65.10 SYSTEMIC INFLAMMATORY RESPONSE SYNDROME (SIRS) OF NON-INFECTIOUS ORIGIN WITHOUT ACUTE ORGAN DYSFUNCTION: ICD-10-CM

## 2022-03-11 DIAGNOSIS — T38.3X5A ADVERSE EFFECT OF INSULIN AND ORAL HYPOGLYCEMIC [ANTIDIABETIC] DRUGS, INITIAL ENCOUNTER: ICD-10-CM
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
## 2022-03-21 ENCOUNTER — EXTERNAL RECORD (OUTPATIENT)
Dept: OTHER | Age: 52
End: 2022-03-21

## 2022-03-23 ENCOUNTER — RESULT REVIEW (OUTPATIENT)
Age: 52
End: 2022-03-23

## 2022-03-26 ENCOUNTER — OUTPATIENT (OUTPATIENT)
Dept: OUTPATIENT SERVICES | Facility: HOSPITAL | Age: 52
LOS: 1 days | End: 2022-03-26
Payer: COMMERCIAL

## 2022-03-26 ENCOUNTER — APPOINTMENT (OUTPATIENT)
Dept: ULTRASOUND IMAGING | Facility: CLINIC | Age: 52
End: 2022-03-26
Payer: COMMERCIAL

## 2022-03-26 DIAGNOSIS — Z98.890 OTHER SPECIFIED POSTPROCEDURAL STATES: Chronic | ICD-10-CM

## 2022-03-26 DIAGNOSIS — Z00.8 ENCOUNTER FOR OTHER GENERAL EXAMINATION: ICD-10-CM

## 2022-03-26 DIAGNOSIS — Z98.51 TUBAL LIGATION STATUS: Chronic | ICD-10-CM

## 2022-03-26 PROCEDURE — 76830 TRANSVAGINAL US NON-OB: CPT | Mod: 26

## 2022-03-26 PROCEDURE — 76856 US EXAM PELVIC COMPLETE: CPT | Mod: 26

## 2022-03-26 PROCEDURE — 76830 TRANSVAGINAL US NON-OB: CPT

## 2022-03-26 PROCEDURE — 76856 US EXAM PELVIC COMPLETE: CPT

## 2022-04-27 ENCOUNTER — OFFICE VISIT (OUTPATIENT)
Dept: FAMILY MEDICINE | Age: 52
End: 2022-04-27

## 2022-04-27 VITALS
WEIGHT: 151.4 LBS | HEART RATE: 84 BPM | DIASTOLIC BLOOD PRESSURE: 74 MMHG | BODY MASS INDEX: 29.57 KG/M2 | SYSTOLIC BLOOD PRESSURE: 106 MMHG | OXYGEN SATURATION: 98 %

## 2022-04-27 DIAGNOSIS — N92.1 MENOMETRORRHAGIA: Primary | ICD-10-CM

## 2022-04-27 DIAGNOSIS — E88.819 INSULIN RESISTANCE: ICD-10-CM

## 2022-04-27 DIAGNOSIS — E88.810 METABOLIC SYNDROME: ICD-10-CM

## 2022-04-27 PROCEDURE — 99214 OFFICE O/P EST MOD 30 MIN: CPT | Performed by: NURSE PRACTITIONER

## 2022-04-27 RX ORDER — SEMAGLUTIDE 1.34 MG/ML
1 INJECTION, SOLUTION SUBCUTANEOUS
Qty: 9 ML | Refills: 1 | Status: SHIPPED | OUTPATIENT
Start: 2022-04-27 | End: 2022-08-05 | Stop reason: SDUPTHER

## 2022-04-27 ASSESSMENT — PATIENT HEALTH QUESTIONNAIRE - PHQ9
1. LITTLE INTEREST OR PLEASURE IN DOING THINGS: NOT AT ALL
SUM OF ALL RESPONSES TO PHQ9 QUESTIONS 1 AND 2: 0
CLINICAL INTERPRETATION OF PHQ2 SCORE: NO FURTHER SCREENING NEEDED
2. FEELING DOWN, DEPRESSED OR HOPELESS: NOT AT ALL
SUM OF ALL RESPONSES TO PHQ9 QUESTIONS 1 AND 2: 0

## 2022-04-27 ASSESSMENT — PAIN SCALES - GENERAL: PAINLEVEL: 0

## 2022-07-08 ENCOUNTER — OUTPATIENT (OUTPATIENT)
Dept: OUTPATIENT SERVICES | Facility: HOSPITAL | Age: 52
LOS: 1 days | End: 2022-07-08
Payer: COMMERCIAL

## 2022-07-08 ENCOUNTER — APPOINTMENT (OUTPATIENT)
Dept: MAMMOGRAPHY | Facility: CLINIC | Age: 52
End: 2022-07-08

## 2022-07-08 DIAGNOSIS — C50.911 MALIGNANT NEOPLASM OF UNSPECIFIED SITE OF RIGHT FEMALE BREAST: ICD-10-CM

## 2022-07-08 DIAGNOSIS — Z98.890 OTHER SPECIFIED POSTPROCEDURAL STATES: Chronic | ICD-10-CM

## 2022-07-08 DIAGNOSIS — Z00.8 ENCOUNTER FOR OTHER GENERAL EXAMINATION: ICD-10-CM

## 2022-07-08 DIAGNOSIS — Z98.51 TUBAL LIGATION STATUS: Chronic | ICD-10-CM

## 2022-07-08 PROCEDURE — 77066 DX MAMMO INCL CAD BI: CPT | Mod: 26

## 2022-07-08 PROCEDURE — G0279: CPT

## 2022-07-08 PROCEDURE — 77066 DX MAMMO INCL CAD BI: CPT

## 2022-07-08 PROCEDURE — G0279: CPT | Mod: 26

## 2022-07-11 ENCOUNTER — APPOINTMENT (OUTPATIENT)
Dept: ORTHOPEDIC SURGERY | Facility: CLINIC | Age: 52
End: 2022-07-11

## 2022-07-11 PROCEDURE — 99213 OFFICE O/P EST LOW 20 MIN: CPT

## 2022-07-11 PROCEDURE — 73560 X-RAY EXAM OF KNEE 1 OR 2: CPT | Mod: LT,RT

## 2022-07-12 NOTE — DISCUSSION/SUMMARY
[de-identified] : At this time, due to osteoarthritis of the bilateral knees, I had a long discussion with the patient and, at this point, she will start on a course of physical therapy.  She will be reassessed in 4-6 weeks to discuss the potential for a total knee arthroplasty.  She needs to be evaluated because it does not appear that she has had a hemoglobin A1c done recently.

## 2022-07-12 NOTE — PHYSICAL EXAM
[de-identified] : Right Knee:\par Knee: Range of Motion in Degrees	\par 	                  Claimant:	Normal:	\par Flexion Active	  120 	                135-degrees	\par Flexion Passive	  120	                135-degrees	\par Extension Active	  10	                0-5-degrees	\par Extension Passive	  10	                0-5-degrees	\par \par No weakness to flexion/extension.  No evidence of instability in the AP plane or varus or valgus stress.  Negative  Lachman.  Negative pivot shift.  Negative anterior drawer test.  Negative posterior drawer test.  Negative Reg.  Negative Apley grind.  No medial or lateral joint line tenderness.  Positive tenderness over the medial and lateral facet of the patella.  Positive patellofemoral crepitations.  No lateral tilting patella.  No patella apprehension.  Positive crepitation in the medial and lateral femoral condyle.  No proximal or distal swelling, edema or tenderness.  No gross motor or sensory deficits.  Mild intra-articular swelling.  2+ DP and PT pulses.  Moderate varus deformity.  No valgus malalignment.  Skin is intact.  No rashes, scars or lesions.  \par \par Left Knee:\par Knee: Range of Motion in Degrees	\par 	                  Claimant:	Normal:	\par Flexion Active	  120 	                135-degrees	\par Flexion Passive	  120	                135-degrees	\par Extension Active	  10	                0-5-degrees	\par Extension Passive	  10	                0-5-degrees	\par \par No weakness to flexion/extension.  No evidence of instability in the AP plane or varus or valgus stress.  Negative  Lachman.  Negative pivot shift.  Negative anterior drawer test.  Negative posterior drawer test.  Negative Reg.  Negative Apley grind.  No medial or lateral joint line tenderness.  Positive tenderness over the medial and lateral facet of the patella.  Positive patellofemoral crepitations.  No lateral tilting patella.  No patella apprehension.  Positive crepitation in the medial and lateral femoral condyle.  No proximal or distal swelling, edema or tenderness.  No gross motor or sensory deficits.  Mild intra-articular swelling.  2+ DP and PT pulses.  Moderate varus deformity.  No valgus malalignment.  Skin is intact.  No rashes, scars or lesions. \par  [de-identified] : Gait and Station:  Ambulating with a slightly antalgic to antalgic gait.  Station:  Normal.  [de-identified] : Appearance:  Well-developed, well-nourished female in no acute distress.\par   [de-identified] : Radiographs, one to two views of the bilateral knees with AP standing taken in the office today, show bone-on-bone medial compartment arthritis.

## 2022-07-12 NOTE — ADDENDUM
[FreeTextEntry1] : This note was written by Karen Hooks on 07/12/2022 acting as scribe for Jose Angel Devi III, MD

## 2022-07-12 NOTE — CONSULT LETTER
[Dear  ___] : Dear  [unfilled], [Consult Letter:] : I had the pleasure of evaluating your patient, [unfilled]. [Please see my note below.] : Please see my note below. [Consult Closing:] : Thank you very much for allowing me to participate in the care of this patient.  If you have any questions, please do not hesitate to contact me. [Sincerely,] : Sincerely, [FreeTextEntry3] : Jose Angel Devi, III, MD\par

## 2022-07-12 NOTE — HISTORY OF PRESENT ILLNESS
[4] : the ailment interference is 4/10 [9] : the ailment interference is 9/10 [de-identified] : The patient comes in today with complaints of pain to bilateral knees.  She was seen and treated elsewhere and had an injection.  She never had any physical therapy.  The patient states she has had this condition for four years.  This injury is not work related or due to an automobile accident.  The patient states the pain is constant.  The patient describes the pain as sharp. [de-identified] : walking, standing up [de-identified] : medication [] : No

## 2022-07-20 ENCOUNTER — APPOINTMENT (OUTPATIENT)
Dept: RADIATION ONCOLOGY | Facility: CLINIC | Age: 52
End: 2022-07-20

## 2022-07-27 ENCOUNTER — APPOINTMENT (OUTPATIENT)
Dept: SURGICAL ONCOLOGY | Facility: CLINIC | Age: 52
End: 2022-07-27

## 2022-07-27 VITALS
RESPIRATION RATE: 17 BRPM | WEIGHT: 235 LBS | SYSTOLIC BLOOD PRESSURE: 147 MMHG | HEIGHT: 65 IN | BODY MASS INDEX: 39.15 KG/M2 | DIASTOLIC BLOOD PRESSURE: 77 MMHG | TEMPERATURE: 98.3 F | HEART RATE: 97 BPM | OXYGEN SATURATION: 93 %

## 2022-07-27 PROCEDURE — 99215 OFFICE O/P EST HI 40 MIN: CPT

## 2022-07-27 NOTE — ASSESSMENT
[FreeTextEntry1] : Stage I right breast cancer \par JOSE\par BI-RADS 2 imaging June 2022\par Continue Tamoxifen as per Dr. Li of medical oncology\par RTO 6 months

## 2022-07-27 NOTE — ADDENDUM
[FreeTextEntry1] : I, Mercedes Ndiaye, acted solely as a scribe for Dr. Luis Mazariegos on this date 07/27/2022.\par

## 2022-07-27 NOTE — PHYSICAL EXAM
[Normal] : supple, no neck mass and thyroid not enlarged [Normal Neck Lymph Nodes] : normal neck lymph nodes  [Normal Supraclavicular Lymph Nodes] : normal supraclavicular lymph nodes [Normal Groin Lymph Nodes] : normal groin lymph nodes [Normal Axillary Lymph Nodes] : normal axillary lymph nodes [Normal] : oriented to person, place and time, with appropriate affect [de-identified] : Right breast lumpectomy scars well healed. mild post radiation changes right breast.  no masses or adenopathy bilaterally.

## 2022-07-27 NOTE — HISTORY OF PRESENT ILLNESS
[de-identified] : 50 y/o female presents a follow up for stage 1 right breast cancer. \par She is s/p right breast lumpectomy post Lali  reflector placement, right axillary/sentinel node biopsy on 7/17/18. 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 (ER/PA+, Her-2neu-)  Oncotype Dx: 16 Genetic testing panel negative.\par \par She is s/p adjuvant radiation therapy 12/11/18.\par Currently on Tamoxifen as per Dr. Gina Li of medical oncology. C/o joint pain and hot flashes.\par \par Recent bilateral mammo/sono performed on 6/8/22 showed no mammographic or sonographic evidence of malignancy. (BI-RADS 2)\par \par MMG/US 6/2/21: No mammographic evidence of malignancy. Left breast cysts. Recommendation of mammography in 1 year. (BI-RADS 2)\par \par Mammogram (11/3/20) revealed bilateral stable probable benign calcifications. Follow-up diagnostic mammogram in 6 months is recommended at the time of annual exam. BI-RADS 3. \par \par Bilateral MMG/US 3/26/19: multiple bilateral cysts. No evidence of malignancy. BI-RADS 2.  \par US Pelvic & Transvaginal (7/13/19): Normal pelvic sonogram.\par \par Denies any previous breast biopsies. \par No family history of breast or ovarian cancer.\par She is Covid vaccinated w/ booster dose.

## 2022-08-02 ENCOUNTER — APPOINTMENT (OUTPATIENT)
Dept: FAMILY MEDICINE | Age: 52
End: 2022-08-02

## 2022-08-04 ENCOUNTER — APPOINTMENT (OUTPATIENT)
Dept: RADIATION ONCOLOGY | Facility: CLINIC | Age: 52
End: 2022-08-04

## 2022-08-04 VITALS
OXYGEN SATURATION: 97 % | HEART RATE: 110 BPM | BODY MASS INDEX: 39.11 KG/M2 | SYSTOLIC BLOOD PRESSURE: 140 MMHG | WEIGHT: 235 LBS | DIASTOLIC BLOOD PRESSURE: 84 MMHG | RESPIRATION RATE: 21 BRPM

## 2022-08-04 PROCEDURE — 99214 OFFICE O/P EST MOD 30 MIN: CPT

## 2022-08-04 NOTE — PHYSICAL EXAM
[Normal] : oriented to person, place and time, the affect was normal, the mood was normal and not anxious [de-identified] : Right breast smaller than left.

## 2022-08-04 NOTE — HISTORY OF PRESENT ILLNESS
[FreeTextEntry1] : GIUSEPPE Pappas is a  51 year old woman with right breast cancer.\par \par This 51-year-old  female presented in March of 2018 with findings of abnormal routine screening mamography. Biopsy of the lesion in the right breast confirmed an invasive ductal carcinoma. She had an MRI confirming these findings and proceeded to lumpectomy in July. Findings from the pathology report at lumpectomy showed an invasive ductal carcinoma measuring 1. 2 cm, with a Sumeet score 6/9. There were no metastases to 3 sentinel lymph nodes, surgical margins were negative and there was no lymphovascular invasion. ER+ NE+ HER2-. Oncotype Dx 16. She was not considered a candidate for chemotherapy and was referred for consideration of adjuvant radiation therapy treatments to improve the rate of local control. There was some delay between lumpectomy and referral for radiation due to insurance issues. She completed RT, total dose of 5240 cGy in 20 fx, to the right breast on 12/6/18 under the direction of Dr. Star Ley.\par \par Mammo performed on 7/8/22 was negative for malignancy.\par \par She presents for 3 years 7 months follow up. Feelig well. Taking Tamoxifen. Feeling well.

## 2022-08-05 ENCOUNTER — TELEPHONE (OUTPATIENT)
Dept: FAMILY MEDICINE | Age: 52
End: 2022-08-05

## 2022-08-05 ENCOUNTER — OFFICE VISIT (OUTPATIENT)
Dept: FAMILY MEDICINE | Age: 52
End: 2022-08-05

## 2022-08-05 VITALS
HEART RATE: 72 BPM | OXYGEN SATURATION: 97 % | SYSTOLIC BLOOD PRESSURE: 110 MMHG | WEIGHT: 145.2 LBS | BODY MASS INDEX: 28.36 KG/M2 | DIASTOLIC BLOOD PRESSURE: 70 MMHG

## 2022-08-05 DIAGNOSIS — Z86.39 HISTORY OF OBESITY: ICD-10-CM

## 2022-08-05 DIAGNOSIS — Z76.89 ENCOUNTER FOR WEIGHT MANAGEMENT: ICD-10-CM

## 2022-08-05 DIAGNOSIS — E88.819 INSULIN RESISTANCE: Primary | ICD-10-CM

## 2022-08-05 PROCEDURE — 99213 OFFICE O/P EST LOW 20 MIN: CPT | Performed by: NURSE PRACTITIONER

## 2022-08-05 RX ORDER — SEMAGLUTIDE 1.34 MG/ML
1 INJECTION, SOLUTION SUBCUTANEOUS
Qty: 9 ML | Refills: 1 | Status: SHIPPED | OUTPATIENT
Start: 2022-08-05 | End: 2022-08-05 | Stop reason: SDUPTHER

## 2022-08-05 RX ORDER — SEMAGLUTIDE 1.34 MG/ML
1 INJECTION, SOLUTION SUBCUTANEOUS
Qty: 9 ML | Refills: 3 | Status: SHIPPED | OUTPATIENT
Start: 2022-08-05 | End: 2023-06-14 | Stop reason: DRUGHIGH

## 2022-08-05 ASSESSMENT — PATIENT HEALTH QUESTIONNAIRE - PHQ9
2. FEELING DOWN, DEPRESSED OR HOPELESS: NOT AT ALL
SUM OF ALL RESPONSES TO PHQ9 QUESTIONS 1 AND 2: 0
1. LITTLE INTEREST OR PLEASURE IN DOING THINGS: NOT AT ALL
CLINICAL INTERPRETATION OF PHQ2 SCORE: NO FURTHER SCREENING NEEDED
SUM OF ALL RESPONSES TO PHQ9 QUESTIONS 1 AND 2: 0

## 2022-08-05 ASSESSMENT — PAIN SCALES - GENERAL: PAINLEVEL: 0

## 2022-08-16 ENCOUNTER — OUTPATIENT (OUTPATIENT)
Dept: OUTPATIENT SERVICES | Facility: HOSPITAL | Age: 52
LOS: 1 days | Discharge: HOME | End: 2022-08-16

## 2022-08-16 DIAGNOSIS — C50.919 MALIGNANT NEOPLASM OF UNSPECIFIED SITE OF UNSPECIFIED FEMALE BREAST: ICD-10-CM

## 2022-08-16 DIAGNOSIS — Z98.890 OTHER SPECIFIED POSTPROCEDURAL STATES: Chronic | ICD-10-CM

## 2022-08-16 DIAGNOSIS — Z98.51 TUBAL LIGATION STATUS: Chronic | ICD-10-CM

## 2022-08-19 DIAGNOSIS — Z01.818 ENCOUNTER FOR OTHER PREPROCEDURAL EXAMINATION: ICD-10-CM

## 2022-08-23 ENCOUNTER — OUTPATIENT (OUTPATIENT)
Dept: OUTPATIENT SERVICES | Facility: HOSPITAL | Age: 52
LOS: 1 days | Discharge: HOME | End: 2022-08-23

## 2022-08-23 ENCOUNTER — LABORATORY RESULT (OUTPATIENT)
Age: 52
End: 2022-08-23

## 2022-08-23 ENCOUNTER — APPOINTMENT (OUTPATIENT)
Dept: HEMATOLOGY ONCOLOGY | Facility: CLINIC | Age: 52
End: 2022-08-23

## 2022-08-23 ENCOUNTER — RESULT REVIEW (OUTPATIENT)
Age: 52
End: 2022-08-23

## 2022-08-23 VITALS
RESPIRATION RATE: 18 BRPM | HEART RATE: 97 BPM | TEMPERATURE: 98 F | OXYGEN SATURATION: 97 % | WEIGHT: 242 LBS | SYSTOLIC BLOOD PRESSURE: 135 MMHG | HEIGHT: 64.17 IN | DIASTOLIC BLOOD PRESSURE: 84 MMHG | BODY MASS INDEX: 41.32 KG/M2

## 2022-08-23 DIAGNOSIS — Z98.890 OTHER SPECIFIED POSTPROCEDURAL STATES: Chronic | ICD-10-CM

## 2022-08-23 DIAGNOSIS — Z98.51 TUBAL LIGATION STATUS: Chronic | ICD-10-CM

## 2022-08-23 DIAGNOSIS — C50.919 MALIGNANT NEOPLASM OF UNSPECIFIED SITE OF UNSPECIFIED FEMALE BREAST: ICD-10-CM

## 2022-08-23 LAB
BASOPHILS # BLD AUTO: 0.07 K/UL — SIGNIFICANT CHANGE UP (ref 0–0.2)
BASOPHILS NFR BLD AUTO: 0.4 % — SIGNIFICANT CHANGE UP (ref 0–2)
EOSINOPHIL # BLD AUTO: 0.43 K/UL — SIGNIFICANT CHANGE UP (ref 0–0.5)
EOSINOPHIL NFR BLD AUTO: 2.6 % — SIGNIFICANT CHANGE UP (ref 0–6)
ERYTHROCYTE [SEDIMENTATION RATE] IN BLOOD: 20 MM/HR — SIGNIFICANT CHANGE UP (ref 0–20)
HCT VFR BLD CALC: 39.1 % — SIGNIFICANT CHANGE UP (ref 34.5–45)
HGB BLD-MCNC: 13 G/DL — SIGNIFICANT CHANGE UP (ref 11.5–15.5)
IMM GRANULOCYTES NFR BLD AUTO: 2 % — HIGH (ref 0–1.5)
LYMPHOCYTES # BLD AUTO: 27.4 % — SIGNIFICANT CHANGE UP (ref 13–44)
LYMPHOCYTES # BLD AUTO: 4.55 K/UL — HIGH (ref 1–3.3)
MCHC RBC-ENTMCNC: 30.2 PG — SIGNIFICANT CHANGE UP (ref 27–34)
MCHC RBC-ENTMCNC: 33.2 GM/DL — SIGNIFICANT CHANGE UP (ref 32–36)
MCV RBC AUTO: 90.9 FL — SIGNIFICANT CHANGE UP (ref 80–100)
MONOCYTES # BLD AUTO: 1.25 K/UL — HIGH (ref 0–0.9)
MONOCYTES NFR BLD AUTO: 7.5 % — SIGNIFICANT CHANGE UP (ref 2–14)
NEUTROPHILS # BLD AUTO: 9.96 K/UL — HIGH (ref 1.8–7.4)
NEUTROPHILS NFR BLD AUTO: 60.1 % — SIGNIFICANT CHANGE UP (ref 43–77)
NRBC # BLD: 0 /100 WBCS — SIGNIFICANT CHANGE UP (ref 0–0)
PLATELET # BLD AUTO: 221 K/UL — SIGNIFICANT CHANGE UP (ref 150–400)
RBC # BLD: 4.3 M/UL — SIGNIFICANT CHANGE UP (ref 3.8–5.2)
RBC # FLD: 13.4 % — SIGNIFICANT CHANGE UP (ref 10.3–14.5)
WBC # BLD: 16.6 K/UL — HIGH (ref 3.8–10.5)
WBC # FLD AUTO: 16.6 K/UL — HIGH (ref 3.8–10.5)

## 2022-08-23 PROCEDURE — 99213 OFFICE O/P EST LOW 20 MIN: CPT

## 2022-08-23 NOTE — HISTORY OF PRESENT ILLNESS
[de-identified] : [place] speaking patient. She wants her  niece Ce to translate.\par \par 50 y/o  female  with elevated WBC / neutrophilia noted since at least  2018  ( WBC 18 K in 2018). In July 2022 WBC 20.7  Hgb and plts normal .\par Feels OK No acute issues. No hx of chronic inflammatory diseases. Hx of stage IA  breast  cancer s/p R lumpectomy 2018, RT ,   on adjuvant Tamoxifen  ( Dr Li),   HTN and NIDDM, obesity.\par

## 2022-08-23 NOTE — REVIEW OF SYSTEMS
[Patient Intake Form Reviewed] : Patient intake form was reviewed [Negative] : Allergic/Immunologic [FreeTextEntry9] : knee pains

## 2022-08-23 NOTE — ASSESSMENT
[FreeTextEntry1] : 50 y/o female with chronic- documented for at least 4 years leukocytosis/ predominantly neutrophilia. No chronic medical conditions associated with reactive leukocytosis.\par Her white cell differential today showed some immature granulocytes and mildly elevated monocytes.\par Rule out myeloproliferative disorder especially CML.\par Will obtain PCR for BCR-ABL today.\par If negative- will need more expanded MPD  mutation panel and bone marrow biopsy.\par \par \par return visit in 2 weeks to discuss results of blood work \par \par \par CC: Dr Katlyn Walker

## 2022-08-23 NOTE — PHYSICAL EXAM
[Obese] : obese [Normal] : affect appropriate [de-identified] : trace bilat ankle edema  [de-identified] : no palpable splenomegaly ( limited exam due to obesity)

## 2022-08-24 ENCOUNTER — APPOINTMENT (OUTPATIENT)
Dept: ORTHOPEDIC SURGERY | Facility: CLINIC | Age: 52
End: 2022-08-24

## 2022-08-24 DIAGNOSIS — D72.829 ELEVATED WHITE BLOOD CELL COUNT, UNSPECIFIED: ICD-10-CM

## 2022-08-24 PROCEDURE — 99213 OFFICE O/P EST LOW 20 MIN: CPT

## 2022-08-26 ENCOUNTER — APPOINTMENT (OUTPATIENT)
Dept: HEMATOLOGY ONCOLOGY | Facility: CLINIC | Age: 52
End: 2022-08-26

## 2022-08-26 VITALS
OXYGEN SATURATION: 97 % | WEIGHT: 239.64 LBS | SYSTOLIC BLOOD PRESSURE: 116 MMHG | RESPIRATION RATE: 16 BRPM | HEIGHT: 64.13 IN | HEART RATE: 113 BPM | BODY MASS INDEX: 40.91 KG/M2 | TEMPERATURE: 97.5 F | DIASTOLIC BLOOD PRESSURE: 82 MMHG

## 2022-08-26 PROCEDURE — 99214 OFFICE O/P EST MOD 30 MIN: CPT

## 2022-08-26 NOTE — ASSESSMENT
[Curative] : Goals of care discussed with patient: Curative [FreeTextEntry1] : Ms. GIUSEPPE WYNN is a 49 yo female with stage 1A ([id number]) invasive ductal carcinoma breast ER/MA +, HER2 neg s/p right lumpectomy and adjuvant radiation.  oncotype 16. Started on tamoxifen January 2019.  \par \par - Breast ca: She is tolerating tamoxifen very well without significant side effects. Reports good compliance. Plan to continue tamoxifen for 10 years. Will switch to AI after menopause. FSH low last visit, recheck today. GYN and opthal f/u annually due to risk of endometrial ca and cataracts respectively.  Up to date with Breast imaging.\par - BONE PAIN: She is overweight, discussed exercise and diet to loose wt. She gained 20 lbs since last visit 6 m ago. She has knee pain. Seeing ortho. Worried if pain is 2/2 tamoxifen. Will do bone scan. \par -  Mild hot flashes: 2/2 tamoxifen.improved  Advised to wear layers and use fan prn. Consider Effexor if get worse.\par - Wt gain:Life style modifications, healthy diet and exercise d/w her. Rec o d/w PCP for a referral to wt loss clinic\par - Irregular periods: 2/2 perimenopause. Annual GYN f/u.  reminded to schedule this year.She will continue to use non-hormonal contraception. Recheck FSH/estradiol today if in menopause will switch to AI\par - Patient is aware of signs and symptoms of DVT/PE. Patient will report if she develops acute onset chest pain shortness of breath, leg swelling or calf pain. \par -Low serum Vit D - Vit D 26.3 instructed to take vit D suppls \par - She has HTN, well controlled. Continue ASA for CVA/VTE ppx. \par RTC 6 m. \par

## 2022-08-26 NOTE — HISTORY OF PRESENT ILLNESS
[Disease: _____________________] : Disease: [unfilled] [T: ___] : T[unfilled] [N: ___] : N[unfilled] [M: ___] : M[unfilled] [AJCC Stage: ____] : AJCC Stage: [unfilled] [de-identified] : Pt is a 46 yo female w/ pmhx HTN and DMII who initially presented with abnormal right mammogram and breast mass in 3/2018. US guided right breast core biopsy in 4/2018 showed infiltrating ductal carcinoma of 1 cm mass. She was seen by surgical oncology. She underwent right lumpectomy and Right sentinal node biopsy in 7/2018 which revealed 1.2 cm  moderately differentiated ductal carcinoma with Houston score 6/9. ER 80-90% positive, NE 20-25% positive. Her-2 was equivocal with negative FISH. 1.2 cm mass. 3 sentinel nodes were negative. Stage 1A- [id number]. \par OncotypeDx is 16 \par RT delayed 2/2 insurance issues\par \par Completed Radiation therapy 12/11/18\par tamoxifen started 1/2019 [de-identified] : moderately differentiated ductal carcinoma  [de-identified] : ER 80-90%, NE 20-25%, HER2 negative  [de-identified] : - genetic testing - 17 gene testing all came back negative. No further testing needed. [FreeTextEntry1] : tamoxifen started 1/2019 [de-identified] : Ms. GIUSEPPE WYNN is here for a follow up visit today for stage I right breast ca dx in 2018, s/p RT and on Tamoxifen since 2019. Tx care from clinic 2019\par \par She is tolerating tamoxifen well with tolerable hot flashes. Good compliance. She denies mood changes, vaginal dryness, SOB, chest pain, leg swelling or clotting issues. Her energy and appetite is good. \par She is overweight, discussed exercise and diet to loose wt. She gained 20 lbs since last visit 6 m ago. She has knee pain. Seeing ortho. Worried if pain is 2/2 tamoxifen. Will do bone scan. Rec o d/w PCP for a referral to wt loss clinic\par  She works full time, has sedentary job but tries to remain active by walking. \par \par LMP date 2018,  no intermittent spotting, no vaginal discharge. She hasn’t been using contraception, was reminded to use condoms. Chemically premenopausal 2019, 2022\par [facility], doesn’t remember name of GYN. Last seen 3/2021 reminded to schedule f/u gyn appointment, denies vaginal bleeding or discharge\par Breast imagin2021, birads 2, 2022 BIRADS 2 \par Opthal:2021 wears \par She has HTN, controlled, she does take baby ASA

## 2022-08-26 NOTE — REASON FOR VISIT
[Follow-Up Visit] : a follow-up [[employer] ] : provided by [employer]   [FreeTextEntry2] : Stage 1A breast Cancer  [FreeTextEntry3] : [place], [id number], [place]

## 2022-08-26 NOTE — PHYSICAL EXAM
[Fully active, able to carry on all pre-disease performance without restriction] : Status 0 - Fully active, able to carry on all pre-disease performance without restriction [Obese] : obese [Normal] : affect appropriate [de-identified] : RIGHT BREAST lumpectomy and axillary scar, mild post RT changes

## 2022-08-28 ENCOUNTER — LAB SERVICES (OUTPATIENT)
Dept: LAB | Age: 52
End: 2022-08-28

## 2022-08-28 DIAGNOSIS — Z00.00 ANNUAL PHYSICAL EXAM: ICD-10-CM

## 2022-08-28 LAB
ALBUMIN SERPL-MCNC: 3.9 G/DL (ref 3.6–5.1)
ALBUMIN/GLOB SERPL: 1.3 {RATIO} (ref 1–2.4)
ALP SERPL-CCNC: 148 UNITS/L (ref 45–117)
ALT SERPL-CCNC: 35 UNITS/L
ANION GAP SERPL CALC-SCNC: 10 MMOL/L (ref 7–19)
AST SERPL-CCNC: 19 UNITS/L
BASOPHILS # BLD: 0 K/MCL (ref 0–0.3)
BASOPHILS NFR BLD: 0 %
BILIRUB SERPL-MCNC: 0.4 MG/DL (ref 0.2–1)
BUN SERPL-MCNC: 13 MG/DL (ref 6–20)
BUN/CREAT SERPL: 19 (ref 7–25)
CALCIUM SERPL-MCNC: 9.2 MG/DL (ref 8.4–10.2)
CHLORIDE SERPL-SCNC: 107 MMOL/L (ref 97–110)
CHOLEST SERPL-MCNC: 249 MG/DL
CHOLEST/HDLC SERPL: 5.8 {RATIO}
CO2 SERPL-SCNC: 28 MMOL/L (ref 21–32)
CREAT SERPL-MCNC: 0.69 MG/DL (ref 0.51–0.95)
DEPRECATED RDW RBC: 40.6 FL (ref 39–50)
EOSINOPHIL # BLD: 0.1 K/MCL (ref 0–0.5)
EOSINOPHIL NFR BLD: 2 %
ERYTHROCYTE [DISTWIDTH] IN BLOOD: 12.3 % (ref 11–15)
FASTING DURATION TIME PATIENT: 12 HOURS (ref 0–999)
FASTING DURATION TIME PATIENT: 12 HOURS (ref 0–999)
GFR SERPLBLD BASED ON 1.73 SQ M-ARVRAT: >90 ML/MIN
GLOBULIN SER-MCNC: 2.9 G/DL (ref 2–4)
GLUCOSE SERPL-MCNC: 91 MG/DL (ref 70–99)
HCT VFR BLD CALC: 38.6 % (ref 36–46.5)
HDLC SERPL-MCNC: 43 MG/DL
HGB BLD-MCNC: 13.3 G/DL (ref 12–15.5)
IMM GRANULOCYTES # BLD AUTO: 0 K/MCL (ref 0–0.2)
IMM GRANULOCYTES # BLD: 0 %
LDLC SERPL CALC-MCNC: 167 MG/DL
LYMPHOCYTES # BLD: 1.5 K/MCL (ref 1–4)
LYMPHOCYTES NFR BLD: 34 %
MCH RBC QN AUTO: 31.1 PG (ref 26–34)
MCHC RBC AUTO-ENTMCNC: 34.5 G/DL (ref 32–36.5)
MCV RBC AUTO: 90.4 FL (ref 78–100)
MONOCYTES # BLD: 0.3 K/MCL (ref 0.3–0.9)
MONOCYTES NFR BLD: 8 %
NEUTROPHILS # BLD: 2.5 K/MCL (ref 1.8–7.7)
NEUTROPHILS NFR BLD: 56 %
NONHDLC SERPL-MCNC: 206 MG/DL
PLATELET # BLD AUTO: 282 K/MCL (ref 140–450)
POTASSIUM SERPL-SCNC: 4.2 MMOL/L (ref 3.4–5.1)
PROT SERPL-MCNC: 6.8 G/DL (ref 6.4–8.2)
RBC # BLD: 4.27 MIL/MCL (ref 4–5.2)
SODIUM SERPL-SCNC: 141 MMOL/L (ref 135–145)
TRIGL SERPL-MCNC: 197 MG/DL
WBC # BLD: 4.5 K/MCL (ref 4.2–11)

## 2022-08-28 PROCEDURE — 80053 COMPREHEN METABOLIC PANEL: CPT | Performed by: INTERNAL MEDICINE

## 2022-08-28 PROCEDURE — 80061 LIPID PANEL: CPT | Performed by: INTERNAL MEDICINE

## 2022-08-28 PROCEDURE — 36415 COLL VENOUS BLD VENIPUNCTURE: CPT | Performed by: INTERNAL MEDICINE

## 2022-08-28 PROCEDURE — 85025 COMPLETE CBC W/AUTO DIFF WBC: CPT | Performed by: INTERNAL MEDICINE

## 2022-08-29 NOTE — PHYSICAL EXAM
[de-identified] : Right Knee:\par Range of Motion in Degrees	\par 	  Claimant:	Normal:	\par Flexion Active	 120 	 135-degrees	\par Flexion Passive	 120	 135-degrees	\par Extension Active	 10	 0-5-degrees	\par Extension Passive	 10	 0-5-degrees	\par \par No weakness to flexion/extension. No evidence of instability in the AP plane or varus or valgus stress. Negative Lachman. Negative pivot shift. Negative anterior drawer test. Negative posterior drawer test. Negative Reg. Negative Apley grind. No medial or lateral joint line tenderness. Positive tenderness over the medial and lateral facet of the patella. Positive patellofemoral crepitations. No lateral tilting patella. No patella apprehension. Positive crepitation in the medial and lateral femoral condyle. No proximal or distal swelling, edema or tenderness. No gross motor or sensory deficits. Mild intra-articular swelling. 2+ DP and PT pulses. Moderate varus deformity. No valgus malalignment. Skin is intact. No rashes, scars or lesions. \par \par Left Knee:\par Range of Motion in Degrees	\par 	  Claimant:	Normal:	\par Flexion Active	 120 	 135-degrees	\par Flexion Passive	 120	 135-degrees	\par Extension Active	 10	 0-5-degrees	\par Extension Passive	 10	 0-5-degrees	\par \par No weakness to flexion/extension. No evidence of instability in the AP plane or varus or valgus stress. Negative Lachman. Negative pivot shift. Negative anterior drawer test. Negative posterior drawer test. Negative Reg. Negative Apley grind. No medial or lateral joint line tenderness. Positive tenderness over the medial and lateral facet of the patella. Positive patellofemoral crepitations. No lateral tilting patella. No patella apprehension. Positive crepitation in the medial and lateral femoral condyle. No proximal or distal swelling, edema or tenderness. No gross motor or sensory deficits. Mild intra-articular swelling. 2+ DP and PT pulses. Moderate varus deformity. No valgus malalignment. Skin is intact. No rashes, scars or lesions. \par  [de-identified] : Ambulating with a slightly antalgic to antalgic gait.  Station:  Normal.  [de-identified] : Appearance:  Well-developed, well-nourished female in no acute distress.\par

## 2022-08-29 NOTE — ADDENDUM
[FreeTextEntry1] : This note was written by Charleen Bah on 08/29/2022 acting as a scribe for ALTHEA JOHNSON III, MD

## 2022-08-29 NOTE — DISCUSSION/SUMMARY
[de-identified] : At this time, due to osteoarthritis of the bilateral knees, we had a long discussion and she will speak with her primary care to see if she can proceed with a cortisone injection for both knees because of her diabetes.  She will be reassessed in two weeks.

## 2022-08-29 NOTE — HISTORY OF PRESENT ILLNESS
[de-identified] : The patient comes in today for her bilateral knees.  She states she is definitely a little better from therapy, but she is still having some pain.

## 2022-08-30 ENCOUNTER — OFFICE VISIT (OUTPATIENT)
Dept: FAMILY MEDICINE | Age: 52
End: 2022-08-30

## 2022-08-30 VITALS
DIASTOLIC BLOOD PRESSURE: 60 MMHG | HEART RATE: 71 BPM | WEIGHT: 144 LBS | HEIGHT: 60 IN | SYSTOLIC BLOOD PRESSURE: 110 MMHG | BODY MASS INDEX: 28.27 KG/M2

## 2022-08-30 DIAGNOSIS — K21.9 GASTROESOPHAGEAL REFLUX DISEASE WITHOUT ESOPHAGITIS: ICD-10-CM

## 2022-08-30 DIAGNOSIS — K76.0 HEPATIC STEATOSIS: ICD-10-CM

## 2022-08-30 DIAGNOSIS — E78.5 DYSLIPIDEMIA: ICD-10-CM

## 2022-08-30 DIAGNOSIS — Z00.00 ANNUAL PHYSICAL EXAM: Primary | ICD-10-CM

## 2022-08-30 DIAGNOSIS — R73.03 PREDIABETES: ICD-10-CM

## 2022-08-30 DIAGNOSIS — Z23 NEED FOR VACCINATION: ICD-10-CM

## 2022-08-30 PROCEDURE — 90632 HEPA VACCINE ADULT IM: CPT | Performed by: FAMILY MEDICINE

## 2022-08-30 PROCEDURE — 90471 IMMUNIZATION ADMIN: CPT | Performed by: FAMILY MEDICINE

## 2022-08-30 PROCEDURE — 99396 PREV VISIT EST AGE 40-64: CPT | Performed by: FAMILY MEDICINE

## 2022-08-30 PROCEDURE — 99214 OFFICE O/P EST MOD 30 MIN: CPT | Performed by: FAMILY MEDICINE

## 2022-08-30 RX ORDER — PANTOPRAZOLE SODIUM 40 MG/1
40 TABLET, DELAYED RELEASE ORAL DAILY
Qty: 30 TABLET | Refills: 0 | Status: SHIPPED | OUTPATIENT
Start: 2022-08-30 | End: 2023-06-05 | Stop reason: DRUGHIGH

## 2022-08-30 ASSESSMENT — PATIENT HEALTH QUESTIONNAIRE - PHQ9
CLINICAL INTERPRETATION OF PHQ2 SCORE: NO FURTHER SCREENING NEEDED
2. FEELING DOWN, DEPRESSED OR HOPELESS: NOT AT ALL
SUM OF ALL RESPONSES TO PHQ9 QUESTIONS 1 AND 2: 0
SUM OF ALL RESPONSES TO PHQ9 QUESTIONS 1 AND 2: 0
1. LITTLE INTEREST OR PLEASURE IN DOING THINGS: NOT AT ALL

## 2022-09-12 ENCOUNTER — NON-APPOINTMENT (OUTPATIENT)
Age: 52
End: 2022-09-12

## 2022-09-12 ENCOUNTER — APPOINTMENT (OUTPATIENT)
Dept: CARDIOLOGY | Facility: CLINIC | Age: 52
End: 2022-09-12

## 2022-09-12 VITALS
WEIGHT: 237 LBS | BODY MASS INDEX: 40.46 KG/M2 | HEIGHT: 64 IN | OXYGEN SATURATION: 96 % | HEART RATE: 93 BPM | SYSTOLIC BLOOD PRESSURE: 114 MMHG | DIASTOLIC BLOOD PRESSURE: 76 MMHG

## 2022-09-12 DIAGNOSIS — I10 ESSENTIAL (PRIMARY) HYPERTENSION: ICD-10-CM

## 2022-09-12 DIAGNOSIS — R94.31 ABNORMAL ELECTROCARDIOGRAM [ECG] [EKG]: ICD-10-CM

## 2022-09-12 DIAGNOSIS — E11.9 TYPE 2 DIABETES MELLITUS W/OUT COMPLICATIONS: ICD-10-CM

## 2022-09-12 DIAGNOSIS — R01.1 CARDIAC MURMUR, UNSPECIFIED: ICD-10-CM

## 2022-09-12 PROCEDURE — 93000 ELECTROCARDIOGRAM COMPLETE: CPT

## 2022-09-12 PROCEDURE — 99204 OFFICE O/P NEW MOD 45 MIN: CPT | Mod: 25

## 2022-09-12 RX ORDER — GLIPIZIDE 5 MG/1
5 TABLET ORAL DAILY
Refills: 0 | Status: ACTIVE | COMMUNITY

## 2022-09-12 RX ORDER — FUROSEMIDE 20 MG/1
20 TABLET ORAL TWICE DAILY
Refills: 0 | Status: ACTIVE | COMMUNITY

## 2022-09-12 RX ORDER — LOSARTAN POTASSIUM 100 MG/1
100 TABLET, FILM COATED ORAL
Qty: 90 | Refills: 3 | Status: ACTIVE | COMMUNITY

## 2022-09-12 RX ORDER — CLONIDINE HYDROCHLORIDE 0.1 MG/1
0.1 TABLET ORAL
Qty: 180 | Refills: 1 | Status: ACTIVE | COMMUNITY

## 2022-09-12 NOTE — HISTORY OF PRESENT ILLNESS
[FreeTextEntry1] : 51 year old female with hx prior breast cancer s/p lumpectomy , radiation , HTN  DM , obesity came for cardiac evaluation as requested by primary , patient accompanied by niece  says  no active symptoms however she does not do much physical activity , patient denies prior cardiac history \par \par \par \par \par \par  normal cholesterol as per patient  her blood pressure well controlled on current medication ,   her blood sugar is better on medication

## 2022-09-12 NOTE — PHYSICAL EXAM
[Obese] : obese [Normal Conjunctiva] : normal conjunctiva [Normal Venous Pressure] : normal venous pressure [No Carotid Bruit] : no carotid bruit [Normal Rate] : normal [Normal S1] : normal S1 [Normal S2] : normal S2 [II] : a grade 2 [No Pitting Edema] : no pitting edema present [2+] : left 2+ [No Abnormalities] : the abdominal aorta was not enlarged and no bruit was heard [Clear Lung Fields] : clear lung fields [Good Air Entry] : good air entry [No Respiratory Distress] : no respiratory distress  [Soft] : abdomen soft [Non Tender] : non-tender [No Masses/organomegaly] : no masses/organomegaly [Normal Bowel Sounds] : normal bowel sounds [Normal Gait] : normal gait [No Edema] : no edema [No Cyanosis] : no cyanosis [No Clubbing] : no clubbing [No Varicosities] : no varicosities [No Rash] : no rash [No Skin Lesions] : no skin lesions [Moves all extremities] : moves all extremities [No Focal Deficits] : no focal deficits [Normal Speech] : normal speech [Alert and Oriented] : alert and oriented [Normal memory] : normal memory [S3] : no S3 [S4] : no S4 [Right Carotid Bruit] : no bruit heard over the right carotid [Left Carotid Bruit] : no bruit heard over the left carotid [Right Femoral Bruit] : no bruit heard over the right femoral artery [Left Femoral Bruit] : no bruit heard over the left femoral artery [Bruit] : no bruit heard

## 2022-09-12 NOTE — ASSESSMENT
[FreeTextEntry1] : Patient with above hx\par \par \par Abnormal EKG non specific T changes  possibly due to underlying hypertensive heart disease : with multiple risk factors for CAD : would recommend echocardiogram to assess ventricular function , exercise stress test to rule out ischemia \par \par Cardiac Murmur : no prior hx of cardiac murmur : will obtain echocardiogram to rule out aortic stenosis , \par \par HTN   controlled , continue diet restriction , medication norvasc ,  clonidine \par \par DM :  controlled as per patient , continue current medication , will obtain her recent blood work from primary \par \par Obesity : Truncal obesity , encourage patient to loose weight , follow life style modification , \par \par follow up after above tests

## 2022-09-14 ENCOUNTER — APPOINTMENT (OUTPATIENT)
Dept: HEMATOLOGY ONCOLOGY | Facility: CLINIC | Age: 52
End: 2022-09-14

## 2022-09-14 VITALS
TEMPERATURE: 97.9 F | RESPIRATION RATE: 17 BRPM | OXYGEN SATURATION: 96 % | SYSTOLIC BLOOD PRESSURE: 119 MMHG | HEART RATE: 96 BPM | WEIGHT: 237.38 LBS | BODY MASS INDEX: 40.75 KG/M2 | DIASTOLIC BLOOD PRESSURE: 76 MMHG

## 2022-09-14 DIAGNOSIS — D72.829 ELEVATED WHITE BLOOD CELL COUNT, UNSPECIFIED: ICD-10-CM

## 2022-09-14 PROCEDURE — 99213 OFFICE O/P EST LOW 20 MIN: CPT

## 2022-09-14 NOTE — ASSESSMENT
[FreeTextEntry1] : 50 y/o female with chronic- documented for at least 4 years leukocytosis/ predominantly neutrophilia. No chronic medical conditions associated with reactive leukocytosis.\par \par BCR-ABL was negative.\par \par Will need bone marrow bx to r/o MPD.\par Explained bone marrow biopsy procedure.\par \par She wants to hold off for now - but will consider having it done later this year ( after colonoscopy) She will call us when she is ready to schedule bone marrow bx.\par \par Patient Intake Form reviewed. no intervention necessary\par \par CC: Dr Katlyn Walker

## 2022-09-14 NOTE — HISTORY OF PRESENT ILLNESS
[de-identified] : [place] speaking patient. She wants her  niece Ce to translate.\par \par 52 y/o  female  with elevated WBC / neutrophilia noted since at least  2018  ( WBC 18 K in 2018). In July 2022 WBC 20.7  Hgb and plts normal .\par Feels OK No acute issues. No hx of chronic inflammatory diseases. Hx of stage IA  breast  cancer s/p R lumpectomy 2018, RT ,   on adjuvant Tamoxifen  ( Dr Li),   HTN and NIDDM, obesity.\par \par labs done here in August : WBC  16 K with neutrophilia  BCR ABL negative  ESR was not elevated \par  [de-identified] : Feels well and has no complaints. Scheduled for routine colonoscopy in oct/ nov

## 2022-09-14 NOTE — END OF VISIT
"  Subjective     Sharath Islas is a 66 y.o. old, male here for Follow-up    65 y/o with PMH of VANNESA/depression, benzo dependence, nephrolithiasis, HLD, GERD, UC    Patient is here for follow-up on chronic medical problems  VANNESA/Depression, benzo dependence: Stable, no major changes.  UC: would like to establish with new GI, rare exacerbations or symptoms.  HLD: on statin and zetia treatment, no SE's or issues.    Review of Systems   Constitutional: Negative.    Cardiovascular: Negative.    Gastrointestinal: Negative.      Medications     Outpatient Medications Marked as Taking for the 8/24/22 encounter (Office Visit) with Manuel Knapp MD   Medication Sig Dispense Refill    ALPRAZolam (XANAX) 0.5 MG tablet TAKE 1 TABLET BY MOUTH 4 TIMES A DAY AS NEEDED FOR ANXIETY. 120 tablet 2    ezetimibe (ZETIA) 10 mg tablet Take 1 tablet by mouth every evening.      hydroCHLOROthiazide (MICROZIDE) 12.5 mg capsule       metoprolol succinate (TOPROL-XL) 25 MG 24 hr tablet       montelukast (SINGULAIR) 10 mg tablet Take 10 mg by mouth every evening.      omeprazole (PRILOSEC) 20 MG capsule Take 20 mg by mouth once daily.      paroxetine (PAXIL) 40 MG tablet TAKE 1 TABLET (40 MG) BY MOUTH IN THE MORNING AND TAKE 1 (40 MG) IN THE EVENING. 180 tablet 3    prednisoLONE acetate (PRED FORTE) 1 % DrpS       PROMETHEGAN 25 mg suppository INSERT 1 SUPPOSITORY RECTALLY EVERY 6 HOURS AS NEEDED FOR NAUSEA 12 suppository 1    ranitidine (ZANTAC) 150 MG tablet Take 150 mg by mouth 2 (two) times daily.      simvastatin (ZOCOR) 40 MG tablet TAKE 1 TABLET (40MG TOTAL) BY MOUTH ONCE DAILY IN THE EVENING 30 tablet 6     Objective     /80   Pulse 86   Ht 5' 8" (1.727 m)   Wt 78.5 kg (173 lb 1 oz)   SpO2 95%   BMI 26.31 kg/m²   Physical Exam  Constitutional:       General: He is not in acute distress.     Appearance: He is well-developed.   Neurological:      Mental Status: He is alert.       Assessment and Plan "     Ulcerative pancolitis without complication  -     Ambulatory referral/consult to Gastroenterology; Future; Expected date: 08/31/2022    Anxiety and depression    Benzodiazepine dependence    Essential hypertension    Mixed hyperlipidemia        No follow-ups on file.  ___________________  Manuel Knapp MD  Internal Medicine and Pediatrics   [Time Spent: ___ minutes] : I have spent [unfilled] minutes of time on the encounter.

## 2022-09-14 NOTE — REVIEW OF SYSTEMS
[Patient Intake Form Reviewed] : Patient intake form was reviewed [Joint Pain] : joint pain [Joint Stiffness] : joint stiffness [Negative] : Allergic/Immunologic [FreeTextEntry9] : not new

## 2022-09-16 ENCOUNTER — APPOINTMENT (OUTPATIENT)
Dept: ORTHOPEDIC SURGERY | Facility: CLINIC | Age: 52
End: 2022-09-16

## 2022-09-16 PROCEDURE — 20610 DRAIN/INJ JOINT/BURSA W/O US: CPT | Mod: LT

## 2022-09-19 ENCOUNTER — RESULT REVIEW (OUTPATIENT)
Age: 52
End: 2022-09-19

## 2022-09-19 ENCOUNTER — OUTPATIENT (OUTPATIENT)
Dept: OUTPATIENT SERVICES | Facility: HOSPITAL | Age: 52
LOS: 1 days | End: 2022-09-19

## 2022-09-19 ENCOUNTER — APPOINTMENT (OUTPATIENT)
Dept: NUCLEAR MEDICINE | Facility: CLINIC | Age: 52
End: 2022-09-19

## 2022-09-19 DIAGNOSIS — C50.911 MALIGNANT NEOPLASM OF UNSPECIFIED SITE OF RIGHT FEMALE BREAST: ICD-10-CM

## 2022-09-19 DIAGNOSIS — Z98.890 OTHER SPECIFIED POSTPROCEDURAL STATES: Chronic | ICD-10-CM

## 2022-09-19 DIAGNOSIS — Z98.51 TUBAL LIGATION STATUS: Chronic | ICD-10-CM

## 2022-09-19 PROCEDURE — 78830 RP LOCLZJ TUM SPECT W/CT 1: CPT | Mod: 26

## 2022-09-19 PROCEDURE — 78306 BONE IMAGING WHOLE BODY: CPT | Mod: 26

## 2022-09-20 ENCOUNTER — NON-APPOINTMENT (OUTPATIENT)
Age: 52
End: 2022-09-20

## 2022-09-20 NOTE — DISCUSSION/SUMMARY
[de-identified] : At this time, due to osteoarthritis of the bilateral knees, I advised ice and elevation. She will come back next week for a cortisone injection to her left knee.

## 2022-09-20 NOTE — ADDENDUM
[FreeTextEntry1] : This note was written by Charleen Bah on 09/20/2022 acting as scribe for Deisi Smith, OTR/L, PA

## 2022-09-20 NOTE — PROCEDURE
[de-identified] : Indication:  Osteoarthritis of Right Knee\par \par Consent: \par At this time, I have recommended an injection to the right knee.  The risks and benefits of the procedure were discussed with the patient in detail.  Upon verbal consent of the patient, we proceeded with the injection as noted below.  \par \par Additionally, due to diabetes, the patient has been informed that cortisone may cause a spike in blood sugar.  In light of this, the patient wishes to proceed with the cortisone injection.\par \par Procedure:  \par After a sterile prep, the patient underwent an injection of approximately 3mL of 1% Xylocaine 20 mg per 2 mL (10 mg per mL) without epinephrine and 2 mL of Kenalog (40mg/mL) into the right knee.  The patient tolerated the procedure well.  There were no complications.  \par \par : [facility]\par Drug name:     Xylocaine-MPF (Lidocaine HCI Injection, USP)\par NDC #:            11207-134-48\par Lot #:              [id number]\par Expiration:      05/26

## 2022-09-20 NOTE — HISTORY OF PRESENT ILLNESS
[de-identified] : The patient comes in today after she was given permission from her primary care doctor to get an injection of cortisone.

## 2022-09-20 NOTE — PHYSICAL EXAM
[de-identified] : Right Knee: \par Range of Motion in Degrees	\par 	                  Claimant:	Normal:	\par Flexion Active	  135 	                135-degrees	\par Flexion Passive	  135	                135-degrees	\par Extension Active	  0-5	                0-5-degrees	\par Extension Passive	  0-5	                0-5-degrees	\par \par No weakness to flexion/extension.  No evidence of instability in the AP plane or varus or valgus stress.  Negative  Lachman.  Negative pivot shift.  Negative anterior drawer test.  Negative posterior drawer test.  Negative Reg.  Negative Apley grind.  No medial or lateral joint line tenderness.  Positive tenderness over the medial and lateral facet of the patella.  Positive patellofemoral crepitations.  No lateral tilting patella.  No patella apprehension.  Positive crepitation in the medial and lateral femoral condyle.  No proximal or distal swelling, edema or tenderness.  No gross motor or sensory deficits.  Mild intra-articular swelling.  2+ DP and PT pulses.  No varus or valgus malalignment.  Skin is intact.  No rashes, scars or lesions. \par \par Left Knee: \par Range of Motion in Degrees	\par 	                  Claimant:	Normal:	\par Flexion Active	  135 	                135-degrees	\par Flexion Passive	  135	                135-degrees	\par Extension Active	  0-5	                0-5-degrees	\par Extension Passive	  0-5	                0-5-degrees	\par \par No weakness to flexion/extension.  No evidence of instability in the AP plane or varus or valgus stress.  Negative  Lachman.  Negative pivot shift.  Negative anterior drawer test.  Negative posterior drawer test.  Negative Reg.  Negative Apley grind.  No medial or lateral joint line tenderness.  Positive tenderness over the medial and lateral facet of the patella.  Positive patellofemoral crepitations.  No lateral tilting patella.  No patella apprehension.  Positive crepitation in the medial and lateral femoral condyle.  No proximal or distal swelling, edema or tenderness.  No gross motor or sensory deficits.  Mild intra-articular swelling.  2+ DP and PT pulses.  No varus or valgus malalignment.  Skin is intact.  No rashes, scars or lesions. \par  [de-identified] : Ambulating with a slightly antalgic to antalgic gait.  Station:  Normal.  [de-identified] : Appearance:  Well-developed, well-nourished female in no acute distress.\par

## 2022-09-23 ENCOUNTER — APPOINTMENT (OUTPATIENT)
Dept: ORTHOPEDIC SURGERY | Facility: CLINIC | Age: 52
End: 2022-09-23

## 2022-09-23 DIAGNOSIS — M17.0 BILATERAL PRIMARY OSTEOARTHRITIS OF KNEE: ICD-10-CM

## 2022-09-23 PROCEDURE — 20610 DRAIN/INJ JOINT/BURSA W/O US: CPT | Mod: LT

## 2022-09-26 NOTE — ED PROVIDER NOTE - IV ALTEPLASE EXCL ABS HIDDEN
In 24 hours, remove the bandage and clean the area with soap and water.  Apply bacitracin ointment and re-cover with a bandage.     In another 24 hours, remove the bandage and clean the area with soap and water.  Apply bacitracin ointment and re-cover with a bandage.     In another 24 hours, remove the bandage and clean area with soap and water.  This time you will not apply any bacitracin ointment or cover with a bandage.  Please leave open to air in order to dry and scab.     Keep area clean and dry as best as possible.  May get wet after wound is scabbed.     Return in 10-14 days for suture removal.    
show

## 2022-09-27 NOTE — ADDENDUM
[FreeTextEntry1] : This note was written by Charleen Bah on 09/27/2022 acting as scribe for Deisi Smith, OTR/L, PA

## 2022-09-27 NOTE — PHYSICAL EXAM
[de-identified] : Right Knee: \par Range of Motion in Degrees	\par 	                  Claimant:	Normal:	\par Flexion Active	  135 	                135-degrees	\par Flexion Passive	  135	                135-degrees	\par Extension Active	  0-5	                0-5-degrees	\par Extension Passive	  0-5	                0-5-degrees	\par \par No weakness to flexion/extension.  No evidence of instability in the AP plane or varus or valgus stress.  Negative  Lachman.  Negative pivot shift.  Negative anterior drawer test.  Negative posterior drawer test.  Negative Reg.  Negative Apley grind.  No medial or lateral joint line tenderness.  Positive tenderness over the medial and lateral facet of the patella.  Positive patellofemoral crepitations.  No lateral tilting patella.  No patella apprehension.  Positive crepitation in the medial and lateral femoral condyle.  No proximal or distal swelling, edema or tenderness.  No gross motor or sensory deficits.  Mild intra-articular swelling.  2+ DP and PT pulses.  No varus or valgus malalignment.  Skin is intact.  No rashes, scars or lesions. \par \par Left Knee: \par Range of Motion in Degrees	\par 	                  Claimant:	Normal:	\par Flexion Active	  135 	                135-degrees	\par Flexion Passive	  135	                135-degrees	\par Extension Active	  0-5	                0-5-degrees	\par Extension Passive	  0-5	                0-5-degrees	\par \par No weakness to flexion/extension.  No evidence of instability in the AP plane or varus or valgus stress.  Negative  Lachman.  Negative pivot shift.  Negative anterior drawer test.  Negative posterior drawer test.  Negative Reg.  Negative Apley grind.  No medial or lateral joint line tenderness.  Positive tenderness over the medial and lateral facet of the patella.  Positive patellofemoral crepitations.  No lateral tilting patella.  No patella apprehension.  Positive crepitation in the medial and lateral femoral condyle.  No proximal or distal swelling, edema or tenderness.  No gross motor or sensory deficits.  Mild intra-articular swelling.  2+ DP and PT pulses.  No varus or valgus malalignment.  Skin is intact.  No rashes, scars or lesions. \par  [de-identified] : Ambulating with a slightly antalgic to antalgic gait.  Station:  Normal.  [de-identified] : Appearance:  Well-developed, well-nourished female in no acute distress.\par

## 2022-09-27 NOTE — PROCEDURE
[de-identified] : Indication: Osteoarthritis of the Left Knee  \par \par Consent: \par At this time, I have recommended an injection to the left knee.  The risks and benefits of the procedure were discussed with the patient in detail.  Upon verbal consent of the patient, we proceeded with the injection as noted below.  \par \par Additionally, due to diabetes, the patient has been informed that cortisone may cause a spike in blood sugar.  In light of this, the patient wishes to proceed with the cortisone injection.\par \par Procedure:  \par After a sterile prep, the patient underwent an injection of approximately 3mL of 1% Xylocaine 20 mg per 2 mL (10 mg per mL) without epinephrine and 2 mL of Kenalog (40mg/mL) into the left knee.  The patient tolerated the procedure well.  There were no complications.  \par \par : [employer]\par Drug name:     Xylocaine-MPF (Lidocaine HCI Injection, USP)\par NDC #:            96017-962-62\par Lot #:              [id number]\par Expiration:      05/26 \par

## 2022-09-27 NOTE — DISCUSSION/SUMMARY
[de-identified] : At this time, due to osteoarthritis of the bilateral knees, we injected the left knee (as noted above).  The patient is a diabetic, but she was given permission to get both knees injected one at a time. She is advised to ice.

## 2022-09-27 NOTE — HISTORY OF PRESENT ILLNESS
[de-identified] : The patient comes in today to get her left knee injected. She had her right knee injected a few weeks ago.

## 2022-09-28 ENCOUNTER — APPOINTMENT (OUTPATIENT)
Dept: CARDIOLOGY | Facility: CLINIC | Age: 52
End: 2022-09-28

## 2022-09-28 LAB — NONINV COLON CA DNA+OCC BLD SCRN STL QL: NEGATIVE

## 2022-09-28 PROCEDURE — 93306 TTE W/DOPPLER COMPLETE: CPT

## 2022-09-28 PROCEDURE — 93015 CV STRESS TEST SUPVJ I&R: CPT

## 2022-09-29 ENCOUNTER — TELEPHONE (OUTPATIENT)
Dept: FAMILY MEDICINE | Age: 52
End: 2022-09-29

## 2022-10-24 ENCOUNTER — APPOINTMENT (OUTPATIENT)
Dept: GASTROENTEROLOGY | Facility: CLINIC | Age: 52
End: 2022-10-24

## 2022-11-01 ENCOUNTER — LAB SERVICES (OUTPATIENT)
Dept: LAB | Age: 52
End: 2022-11-01

## 2022-11-01 DIAGNOSIS — R73.03 PREDIABETES: ICD-10-CM

## 2022-11-01 DIAGNOSIS — E78.5 DYSLIPIDEMIA: ICD-10-CM

## 2022-11-01 LAB
ALBUMIN SERPL-MCNC: 3.8 G/DL (ref 3.6–5.1)
ALP SERPL-CCNC: 142 UNITS/L (ref 45–117)
ALT SERPL-CCNC: 43 UNITS/L
AST SERPL-CCNC: 20 UNITS/L
BILIRUB CONJ SERPL-MCNC: <0.1 MG/DL (ref 0–0.2)
BILIRUB SERPL-MCNC: 0.3 MG/DL (ref 0.2–1)
CHOLEST SERPL-MCNC: 146 MG/DL
CHOLEST/HDLC SERPL: 2.9 {RATIO}
FASTING DURATION TIME PATIENT: ABNORMAL H
HBA1C MFR BLD: 5.2 % (ref 4.5–5.6)
HDLC SERPL-MCNC: 51 MG/DL
LDLC SERPL CALC-MCNC: 57 MG/DL
NONHDLC SERPL-MCNC: 95 MG/DL
PROT SERPL-MCNC: 6.5 G/DL (ref 6.4–8.2)
TRIGL SERPL-MCNC: 191 MG/DL

## 2022-11-01 PROCEDURE — 36415 COLL VENOUS BLD VENIPUNCTURE: CPT | Performed by: INTERNAL MEDICINE

## 2022-11-01 PROCEDURE — 80076 HEPATIC FUNCTION PANEL: CPT | Performed by: INTERNAL MEDICINE

## 2022-11-01 PROCEDURE — 83036 HEMOGLOBIN GLYCOSYLATED A1C: CPT | Performed by: INTERNAL MEDICINE

## 2022-11-01 PROCEDURE — 80061 LIPID PANEL: CPT | Performed by: INTERNAL MEDICINE

## 2022-11-02 ENCOUNTER — OFFICE VISIT (OUTPATIENT)
Dept: FAMILY MEDICINE | Age: 52
End: 2022-11-02

## 2022-11-02 ENCOUNTER — LAB SERVICES (OUTPATIENT)
Dept: LAB | Age: 52
End: 2022-11-02

## 2022-11-02 VITALS
DIASTOLIC BLOOD PRESSURE: 82 MMHG | HEART RATE: 106 BPM | BODY MASS INDEX: 28.87 KG/M2 | WEIGHT: 147.8 LBS | OXYGEN SATURATION: 99 % | SYSTOLIC BLOOD PRESSURE: 128 MMHG

## 2022-11-02 DIAGNOSIS — E78.5 DYSLIPIDEMIA: ICD-10-CM

## 2022-11-02 DIAGNOSIS — R74.8 ELEVATED ALKALINE PHOSPHATASE LEVEL: ICD-10-CM

## 2022-11-02 DIAGNOSIS — R10.11 RIGHT UPPER QUADRANT ABDOMINAL PAIN: Primary | ICD-10-CM

## 2022-11-02 DIAGNOSIS — R73.03 PREDIABETES: ICD-10-CM

## 2022-11-02 PROCEDURE — 99214 OFFICE O/P EST MOD 30 MIN: CPT | Performed by: FAMILY MEDICINE

## 2022-11-02 PROCEDURE — 36415 COLL VENOUS BLD VENIPUNCTURE: CPT | Performed by: INTERNAL MEDICINE

## 2022-11-02 PROCEDURE — 84075 ASSAY ALKALINE PHOSPHATASE: CPT | Performed by: INTERNAL MEDICINE

## 2022-11-02 PROCEDURE — 84080 ASSAY ALKALINE PHOSPHATASES: CPT | Performed by: INTERNAL MEDICINE

## 2022-11-02 RX ORDER — ROSUVASTATIN CALCIUM 10 MG/1
10 TABLET, COATED ORAL AT BEDTIME
Qty: 90 TABLET | Refills: 1 | Status: SHIPPED | OUTPATIENT
Start: 2022-11-02 | End: 2023-02-07 | Stop reason: SDUPTHER

## 2022-11-02 ASSESSMENT — PATIENT HEALTH QUESTIONNAIRE - PHQ9
1. LITTLE INTEREST OR PLEASURE IN DOING THINGS: NOT AT ALL
2. FEELING DOWN, DEPRESSED OR HOPELESS: NOT AT ALL
CLINICAL INTERPRETATION OF PHQ2 SCORE: NO FURTHER SCREENING NEEDED
SUM OF ALL RESPONSES TO PHQ9 QUESTIONS 1 AND 2: 0
SUM OF ALL RESPONSES TO PHQ9 QUESTIONS 1 AND 2: 0

## 2022-11-04 ENCOUNTER — APPOINTMENT (OUTPATIENT)
Dept: FAMILY MEDICINE | Age: 52
End: 2022-11-04

## 2022-11-07 ENCOUNTER — E-ADVICE (OUTPATIENT)
Dept: FAMILY MEDICINE | Age: 52
End: 2022-11-07

## 2022-11-07 DIAGNOSIS — R10.11 RIGHT UPPER QUADRANT ABDOMINAL PAIN: ICD-10-CM

## 2022-11-07 DIAGNOSIS — R74.8 ELEVATED ALKALINE PHOSPHATASE LEVEL: Primary | ICD-10-CM

## 2022-11-07 LAB
ALP BONE CFR SERPL: 47 % (ref 10.7–68.3)
ALP BONE CFR SERPL: 67.2 U/L (ref 12.9–52.6)
ALP INTEST CFR SERPL: 18.8 % (ref 0–24.2)
ALP INTEST CFR SERPL: 26.9 U/L (ref 0–16.3)
ALP LIVER CFR SERPL: 34.3 % (ref 26–86.2)
ALP LIVER CFR SERPL: 49 U/L (ref 16–69.3)
ALP SERPL-CCNC: 143 U/L (ref 34–123)

## 2022-11-16 ENCOUNTER — HOSPITAL ENCOUNTER (OUTPATIENT)
Dept: NUCLEAR MEDICINE | Age: 52
Discharge: HOME | End: 2022-11-16
Attending: FAMILY MEDICINE

## 2022-11-16 DIAGNOSIS — R10.11 RIGHT UPPER QUADRANT ABDOMINAL PAIN: ICD-10-CM

## 2022-11-16 DIAGNOSIS — R74.8 ELEVATED ALKALINE PHOSPHATASE LEVEL: ICD-10-CM

## 2022-11-16 PROCEDURE — G1004 CDSM NDSC: HCPCS | Performed by: RADIOLOGY

## 2022-11-16 PROCEDURE — 78306 BONE IMAGING WHOLE BODY: CPT

## 2022-11-16 PROCEDURE — G1004 CDSM NDSC: HCPCS

## 2022-11-16 PROCEDURE — 78306 BONE IMAGING WHOLE BODY: CPT | Performed by: RADIOLOGY

## 2022-11-17 ENCOUNTER — TELEPHONE (OUTPATIENT)
Dept: FAMILY MEDICINE | Age: 52
End: 2022-11-17

## 2022-11-23 ENCOUNTER — APPOINTMENT (OUTPATIENT)
Dept: NUCLEAR MEDICINE | Age: 52
End: 2022-11-23
Attending: FAMILY MEDICINE

## 2022-11-30 ENCOUNTER — TELEPHONE (OUTPATIENT)
Dept: FAMILY MEDICINE | Age: 52
End: 2022-11-30

## 2022-11-30 ENCOUNTER — IMAGING SERVICES (OUTPATIENT)
Dept: ULTRASOUND IMAGING | Age: 52
End: 2022-11-30
Attending: FAMILY MEDICINE

## 2022-11-30 DIAGNOSIS — R10.11 RIGHT UPPER QUADRANT ABDOMINAL PAIN: ICD-10-CM

## 2022-11-30 DIAGNOSIS — R74.8 ELEVATED ALKALINE PHOSPHATASE LEVEL: ICD-10-CM

## 2022-11-30 DIAGNOSIS — K76.0 HEPATIC STEATOSIS: Primary | ICD-10-CM

## 2022-11-30 DIAGNOSIS — R10.11 ABDOMINAL PAIN, RIGHT UPPER QUADRANT: ICD-10-CM

## 2022-11-30 DIAGNOSIS — K76.0 HEPATIC STEATOSIS: ICD-10-CM

## 2022-11-30 DIAGNOSIS — R74.8 ACID PHOSPHATASE ELEVATED: ICD-10-CM

## 2022-11-30 PROCEDURE — 76705 ECHO EXAM OF ABDOMEN: CPT | Performed by: RADIOLOGY

## 2022-12-02 ENCOUNTER — APPOINTMENT (OUTPATIENT)
Dept: FAMILY MEDICINE | Age: 52
End: 2022-12-02

## 2023-01-27 ENCOUNTER — NON-APPOINTMENT (OUTPATIENT)
Age: 53
End: 2023-01-27

## 2023-01-27 ENCOUNTER — APPOINTMENT (OUTPATIENT)
Dept: SURGICAL ONCOLOGY | Facility: CLINIC | Age: 53
End: 2023-01-27
Payer: COMMERCIAL

## 2023-01-27 VITALS
DIASTOLIC BLOOD PRESSURE: 102 MMHG | HEIGHT: 64 IN | SYSTOLIC BLOOD PRESSURE: 185 MMHG | RESPIRATION RATE: 16 BRPM | BODY MASS INDEX: 42.68 KG/M2 | WEIGHT: 250 LBS | OXYGEN SATURATION: 98 % | HEART RATE: 111 BPM

## 2023-01-27 PROCEDURE — 99215 OFFICE O/P EST HI 40 MIN: CPT

## 2023-01-27 RX ORDER — MUPIROCIN 20 MG/G
2 OINTMENT TOPICAL 3 TIMES DAILY
Qty: 1 | Refills: 0 | Status: ACTIVE | COMMUNITY
Start: 2023-01-27 | End: 1900-01-01

## 2023-01-27 NOTE — ASSESSMENT
[FreeTextEntry1] : Stage I right breast cancer \par JOSE\par BI-RADS 2 mammogram July 2022\par Continue Tamoxifen as per Dr. Li of medical oncology\par Continue yearly breast imaging June 2023\par Recommend Hydrocortisone cream for right breast skin lesions and dermatology consult - nurse navigator seeing patient and will help make dermatology appointment\par RTO 6 months

## 2023-01-27 NOTE — HISTORY OF PRESENT ILLNESS
[de-identified] : 51 y/o female presents a follow up for stage 1 right breast cancer. \par She is s/p right breast lumpectomy post Lali  reflector placement, right axillary/sentinel node biopsy on 7/17/18. 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 (ER/FL+, Her-2neu-)  Oncotype Dx: 16 Genetic testing panel negative.\par Completed adjuvant radiation therapy 12/11/18.\par Currently on Tamoxifen as per Dr. Gina Li of medical oncology, denies hot flashes or any joint pain. \par \par Today she presents with multiple patches of red skin lesions in the right breast and abdomen that appeared about 2 weeks ago. Denies any itchiness or pain. She denies taking any new medications. \par \par Bilateral mammogram performed on 7/8/22 showed no mammographic or sonographic evidence of malignancy. (BI-RADS 2)\par \par MMG/US 6/2/21: No mammographic evidence of malignancy. Left breast cysts. Recommendation of mammography in 1 year. (BI-RADS 2)\par \par Mammogram (11/3/20) revealed bilateral stable probable benign calcifications. Follow-up diagnostic mammogram in 6 months is recommended at the time of annual exam. BI-RADS 3. \par \par Bilateral MMG/US 3/26/19: multiple bilateral cysts. No evidence of malignancy. BI-RADS 2.  \par US Pelvic & Transvaginal (7/13/19): Normal pelvic sonogram.\par \par Denies any previous breast biopsies. \par No family history of breast or ovarian cancer.\par She is Covid vaccinated w/ booster dose.

## 2023-01-27 NOTE — ADDENDUM
[FreeTextEntry1] : I, Mercedes Ndiaye, acted solely as a scribe for Dr. Luis Mazariegos on this date 01/27/2023.

## 2023-01-27 NOTE — PHYSICAL EXAM
[Normal] : supple, no neck mass and thyroid not enlarged [Normal Neck Lymph Nodes] : normal neck lymph nodes  [Normal Supraclavicular Lymph Nodes] : normal supraclavicular lymph nodes [Normal Groin Lymph Nodes] : normal groin lymph nodes [Normal Axillary Lymph Nodes] : normal axillary lymph nodes [Normal] : oriented to person, place and time, with appropriate affect [de-identified] : right breast lumpectomy scars well healed. no masses or adenopathy bilaterally.  multiple ulcerated skin lesions right upper abdomen below IMF. no infection.

## 2023-01-30 ENCOUNTER — APPOINTMENT (OUTPATIENT)
Dept: GASTROENTEROLOGY | Facility: CLINIC | Age: 53
End: 2023-01-30
Payer: COMMERCIAL

## 2023-01-30 ENCOUNTER — EXTERNAL RECORD (OUTPATIENT)
Dept: OTHER | Age: 53
End: 2023-01-30

## 2023-01-30 VITALS — HEIGHT: 64 IN | BODY MASS INDEX: 42.68 KG/M2 | WEIGHT: 250 LBS

## 2023-01-30 DIAGNOSIS — Z12.11 ENCOUNTER FOR SCREENING FOR MALIGNANT NEOPLASM OF COLON: ICD-10-CM

## 2023-01-30 LAB — HM MAMMOGRAPHY BILATERAL: NORMAL

## 2023-01-30 PROCEDURE — 99203 OFFICE O/P NEW LOW 30 MIN: CPT

## 2023-01-31 NOTE — HISTORY OF PRESENT ILLNESS
[FreeTextEntry1] : 52 F hx Breast Ca in 2018, DM on Jardiance/Glipizide, HTN on amlodipine/losartan, obesity presenting for initial screening colonoscopy eval. She reports no personal or family hx of GI disease or cancer. Currently has no complaints of nausea, vomiting, abdominal pain, constipation, diarrhea,melena, hematochezia.

## 2023-01-31 NOTE — ASSESSMENT
[FreeTextEntry1] : 52 F hx Breast Ca in 2018, DM on Jardiance/Glipizide, HTN on amlodipine/losartan, obesity presenting for initial screening colonoscopy eval. Due to BMI will need to have procedure at . Will plan for colonoscopy. Discussed with patient prep, procedure, and risks such as missed lesions/polyps, bleeding, and perforation of the bowel. Miralax prep. All questions answered. Discussed with Dr. Dietrich.

## 2023-02-07 RX ORDER — ROSUVASTATIN CALCIUM 10 MG/1
10 TABLET, COATED ORAL AT BEDTIME
Qty: 90 TABLET | Refills: 0 | Status: SHIPPED | OUTPATIENT
Start: 2023-02-07 | End: 2023-06-05 | Stop reason: SDUPTHER

## 2023-02-09 RX ORDER — ROSUVASTATIN CALCIUM 10 MG/1
10 TABLET, COATED ORAL DAILY
Qty: 14 TABLET | Refills: 0 | Status: SHIPPED | OUTPATIENT
Start: 2023-02-09 | End: 2023-06-05 | Stop reason: SDUPTHER

## 2023-02-09 RX ORDER — ROSUVASTATIN CALCIUM 10 MG/1
10 TABLET, COATED ORAL AT BEDTIME
Qty: 90 TABLET | Refills: 0 | Status: CANCELLED | OUTPATIENT
Start: 2023-02-09

## 2023-02-23 ENCOUNTER — OUTPATIENT (OUTPATIENT)
Dept: OUTPATIENT SERVICES | Facility: HOSPITAL | Age: 53
LOS: 1 days | Discharge: HOME | End: 2023-02-23

## 2023-02-23 DIAGNOSIS — Z98.890 OTHER SPECIFIED POSTPROCEDURAL STATES: Chronic | ICD-10-CM

## 2023-02-23 DIAGNOSIS — Z98.51 TUBAL LIGATION STATUS: Chronic | ICD-10-CM

## 2023-02-23 DIAGNOSIS — C50.919 MALIGNANT NEOPLASM OF UNSPECIFIED SITE OF UNSPECIFIED FEMALE BREAST: ICD-10-CM

## 2023-03-01 ENCOUNTER — TELEPHONE (OUTPATIENT)
Dept: OTHER | Age: 53
End: 2023-03-01

## 2023-03-01 ENCOUNTER — TELEPHONE (OUTPATIENT)
Dept: FAMILY MEDICINE | Age: 53
End: 2023-03-01

## 2023-03-01 ENCOUNTER — E-ADVICE (OUTPATIENT)
Dept: FAMILY MEDICINE | Age: 53
End: 2023-03-01

## 2023-03-03 ENCOUNTER — APPOINTMENT (OUTPATIENT)
Dept: HEMATOLOGY ONCOLOGY | Facility: CLINIC | Age: 53
End: 2023-03-03
Payer: COMMERCIAL

## 2023-03-03 VITALS
OXYGEN SATURATION: 96 % | WEIGHT: 249.12 LBS | SYSTOLIC BLOOD PRESSURE: 135 MMHG | DIASTOLIC BLOOD PRESSURE: 84 MMHG | TEMPERATURE: 97.5 F | BODY MASS INDEX: 42.76 KG/M2 | HEART RATE: 108 BPM | RESPIRATION RATE: 17 BRPM

## 2023-03-03 PROCEDURE — 99214 OFFICE O/P EST MOD 30 MIN: CPT

## 2023-03-03 NOTE — ASSESSMENT
[FreeTextEntry1] : Ms. GIUSEPPE WYNN is a 51 yo female with stage 1A ([id number]) invasive ductal carcinoma breast ER/WV +, HER2 neg s/p right lumpectomy and adjuvant radiation.  oncotype 16. Started on tamoxifen January 2019.  \par \par - Breast ca: She is tolerating tamoxifen very well without significant side effects. Reports good compliance. Plan to continue tamoxifen for 10 years. Will switch to AI after menopause. FSH low last visit, recheck today. GYN and opthal f/u annually due to risk of endometrial ca and cataracts respectively.  Up to date with Breast imaging.\par - BONE PAIN: . Bone scan JOSE 9/2022. pain resolved now\par -  Mild hot flashes: 2/2 tamoxifen.improved  Advised to wear layers and use fan prn. Consider Effexor if get worse.\par - Wt gain:Life style modifications, healthy diet and exercise d/w her.  referral to wt loss clinic\par - Irregular periods: 2/2 perimenopause. Annual GYN f/u.  reminded to schedule this year.She will continue to use non-hormonal contraception. Recheck FSH/estradiol today if in menopause will switch to AI\par - Patient is aware of signs and symptoms of DVT/PE. Patient will report if she develops acute onset chest pain shortness of breath, leg swelling or calf pain. \par -Low serum Vit D - Vit D 26.3 instructed to take vit D suppls \par - She has HTN, well controlled. Continue ASA for CVA/VTE ppx. \par RTC 6 m. \par  [Curative] : Goals of care discussed with patient: Curative

## 2023-03-03 NOTE — REASON FOR VISIT
[Follow-Up Visit] : a follow-up [Family Member] : family member [[employer] ] : provided by [employer]   [FreeTextEntry2] : Stage 1A breast Cancer  [FreeTextEntry3] : niece translating for the pt

## 2023-03-03 NOTE — PHYSICAL EXAM
[Fully active, able to carry on all pre-disease performance without restriction] : Status 0 - Fully active, able to carry on all pre-disease performance without restriction [Obese] : obese [Normal] : affect appropriate [de-identified] : RIGHT BREAST lumpectomy and axillary scar, mild post RT changes

## 2023-03-03 NOTE — HISTORY OF PRESENT ILLNESS
[Disease: _____________________] : Disease: [unfilled] [T: ___] : T[unfilled] [N: ___] : N[unfilled] [M: ___] : M[unfilled] [AJCC Stage: ____] : AJCC Stage: [unfilled] [de-identified] : Pt is a 48 yo female w/ pmhx HTN and DMII who initially presented with abnormal right mammogram and breast mass in 3/2018. US guided right breast core biopsy in 4/2018 showed infiltrating ductal carcinoma of 1 cm mass. She was seen by surgical oncology. She underwent right lumpectomy and Right sentinal node biopsy in 7/2018 which revealed 1.2 cm  moderately differentiated ductal carcinoma with Chelsea score 6/9. ER 80-90% positive, OR 20-25% positive. Her-2 was equivocal with negative FISH. 1.2 cm mass. 3 sentinel nodes were negative. Stage 1A- [id number]. \par OncotypeDx is 16 \par RT delayed 2/2 insurance issues\par \par Completed Radiation therapy 12/11/18\par tamoxifen started 1/2019 [de-identified] : moderately differentiated ductal carcinoma  [de-identified] : ER 80-90%, VA 20-25%, HER2 negative  [de-identified] : - genetic testing - 17 gene testing all came back negative. No further testing needed. [FreeTextEntry1] : tamoxifen started 1/2019 [de-identified] : Ms. GIUSEPPE WYNN is here for a follow up visit today for stage I right breast ca dx in 2018, s/p RT and on Tamoxifen since 2019. Tx care from clinic 2019\par \par She is tolerating tamoxifen well with tolerable hot flashes. Good compliance. She denies mood changes, vaginal dryness, SOB, chest pain, leg swelling or clotting issues. Her energy and appetite is good. \par She is overweight, discussed exercise and diet to loose wt. She gained 20 lbs since last visit 6 m ago. Bone scan JOSE 2022. referral to wt loss clinic\par  She works full time, has sedentary job but tries to remain active by walking. \par \par LMP date 2018,  no intermittent spotting, no vaginal discharge. She hasn’t been using contraception, was reminded to use condoms. Chemically premenopausal 2019, 2022\par GYN - [facility] clinic, doesn’t remember name of GYN. Last seen 3/2021 reminded to schedule f/u gyn appointment, denies vaginal bleeding or discharge\par Breast imagin2021, birads 2, 2022 BIRADS 2 \par Opthal:2022\par She has HTN, controlled, she does take baby ASA

## 2023-03-29 ENCOUNTER — TELEPHONE (OUTPATIENT)
Dept: FAMILY MEDICINE | Age: 53
End: 2023-03-29

## 2023-03-30 ENCOUNTER — OFFICE VISIT (OUTPATIENT)
Dept: FAMILY MEDICINE | Age: 53
End: 2023-03-30

## 2023-03-30 VITALS
HEART RATE: 86 BPM | WEIGHT: 146 LBS | TEMPERATURE: 97.4 F | BODY MASS INDEX: 28.51 KG/M2 | DIASTOLIC BLOOD PRESSURE: 60 MMHG | SYSTOLIC BLOOD PRESSURE: 92 MMHG | OXYGEN SATURATION: 98 %

## 2023-03-30 DIAGNOSIS — N39.0 URINARY TRACT INFECTION WITHOUT HEMATURIA, SITE UNSPECIFIED: Primary | ICD-10-CM

## 2023-03-30 LAB
APPEARANCE, POC: CLEAR
BILIRUBIN, POC: NEGATIVE
COLOR, POC: YELLOW
GLUCOSE UR-MCNC: NEGATIVE MG/DL
KETONES, POC: NEGATIVE MG/DL
NITRITE, POC: NEGATIVE
OCCULT BLOOD, POC: NEGATIVE
PH UR: 6.5 [PH] (ref 5–7)
PROT UR-MCNC: NEGATIVE MG/DL
SP GR UR: 1.02 (ref 1–1.03)
UROBILINOGEN UR-MCNC: 0.2 MG/DL (ref 0–1)
WBC (LEUKOCYTE) ESTERASE, POC: ABNORMAL

## 2023-03-30 PROCEDURE — 81002 URINALYSIS NONAUTO W/O SCOPE: CPT | Performed by: NURSE PRACTITIONER

## 2023-03-30 PROCEDURE — 99213 OFFICE O/P EST LOW 20 MIN: CPT | Performed by: NURSE PRACTITIONER

## 2023-03-30 RX ORDER — SULFAMETHOXAZOLE AND TRIMETHOPRIM 800; 160 MG/1; MG/1
1 TABLET ORAL 2 TIMES DAILY
Qty: 10 TABLET | Refills: 0 | Status: SHIPPED | OUTPATIENT
Start: 2023-03-30 | End: 2023-04-04

## 2023-04-03 ENCOUNTER — NON-APPOINTMENT (OUTPATIENT)
Age: 53
End: 2023-04-03

## 2023-04-04 ENCOUNTER — NON-APPOINTMENT (OUTPATIENT)
Age: 53
End: 2023-04-04

## 2023-04-04 ENCOUNTER — APPOINTMENT (OUTPATIENT)
Dept: DERMATOLOGY | Facility: CLINIC | Age: 53
End: 2023-04-04
Payer: COMMERCIAL

## 2023-04-04 DIAGNOSIS — D48.5 NEOPLASM OF UNCERTAIN BEHAVIOR OF SKIN: ICD-10-CM

## 2023-04-04 DIAGNOSIS — L29.9 PRURITUS, UNSPECIFIED: ICD-10-CM

## 2023-04-04 PROCEDURE — 11104 PUNCH BX SKIN SINGLE LESION: CPT

## 2023-04-04 PROCEDURE — 99204 OFFICE O/P NEW MOD 45 MIN: CPT | Mod: 25

## 2023-04-04 NOTE — PHYSICAL EXAM
[FreeTextEntry3] : AAOx3, pleasant, NAD, no visual lymphadenopathy\par hair, scalp, face, nose, eyelids, ears, lips, oropharynx, neck, chest, abdomen, back, right arm, left arm, nails, and hands examined with all normal findings,\par pertinent findings include:\par \par annular plaque with hyperpigmentation and erythema under right breast

## 2023-04-04 NOTE — ASSESSMENT
[FreeTextEntry1] : rash; favor tinea vs. sarcoid vs. GA vs other\par PUNCH BIOPSY Location right breast\par Diagnosis:  r/o above\par \par 4 mm Punch biopsy performed today over above location, risks and benefits discussed including incomplete removal, not enough tissue for diagnosis scarring and infection, informed consent obtained, lesion cleaned with alcohol and anesthetized with 1% lido+epi, 1 cc total, hemostasis obtained 4-0 nylon suture, vaseline and bandaid placed, tolerated well, wound care reviewed, specimen sent to pathology, will return in 2 weeks for suture removal.\par \par f/u 2 weeks for s/r and discussion of results\par will send results to breast surgeon; Dr. Gonzalez

## 2023-04-04 NOTE — HISTORY OF PRESENT ILLNESS
[FreeTextEntry1] : rash [de-identified] : 52 year old female here for rash on right breast. hx of breast Ca Jan 2019.\par rash started 2-3 months prior. resolving. has used mupirocin.

## 2023-04-07 ENCOUNTER — OFFICE VISIT (OUTPATIENT)
Dept: FAMILY MEDICINE | Age: 53
End: 2023-04-07

## 2023-04-07 ENCOUNTER — LAB SERVICES (OUTPATIENT)
Dept: LAB | Age: 53
End: 2023-04-07

## 2023-04-07 VITALS
HEART RATE: 71 BPM | OXYGEN SATURATION: 100 % | DIASTOLIC BLOOD PRESSURE: 72 MMHG | WEIGHT: 146 LBS | BODY MASS INDEX: 28.51 KG/M2 | SYSTOLIC BLOOD PRESSURE: 118 MMHG

## 2023-04-07 DIAGNOSIS — Z01.810 PREOP CARDIOVASCULAR EXAM: Primary | ICD-10-CM

## 2023-04-07 DIAGNOSIS — Z01.810 PREOP CARDIOVASCULAR EXAM: ICD-10-CM

## 2023-04-07 LAB
BASOPHILS # BLD: 0 K/MCL (ref 0–0.3)
BASOPHILS NFR BLD: 1 %
DEPRECATED RDW RBC: 41.8 FL (ref 39–50)
EOSINOPHIL # BLD: 0.1 K/MCL (ref 0–0.5)
EOSINOPHIL NFR BLD: 2 %
ERYTHROCYTE [DISTWIDTH] IN BLOOD: 12.7 % (ref 11–15)
HCT VFR BLD CALC: 35.7 % (ref 36–46.5)
HGB BLD-MCNC: 12.1 G/DL (ref 12–15.5)
IMM GRANULOCYTES # BLD AUTO: 0 K/MCL (ref 0–0.2)
IMM GRANULOCYTES # BLD: 0 %
LYMPHOCYTES # BLD: 1.8 K/MCL (ref 1–4)
LYMPHOCYTES NFR BLD: 35 %
MCH RBC QN AUTO: 31 PG (ref 26–34)
MCHC RBC AUTO-ENTMCNC: 33.9 G/DL (ref 32–36.5)
MCV RBC AUTO: 91.5 FL (ref 78–100)
MONOCYTES # BLD: 0.4 K/MCL (ref 0.3–0.9)
MONOCYTES NFR BLD: 7 %
NEUTROPHILS # BLD: 2.8 K/MCL (ref 1.8–7.7)
NEUTROPHILS NFR BLD: 55 %
NRBC BLD MANUAL-RTO: 0 /100 WBC
PLATELET # BLD AUTO: 262 K/MCL (ref 140–450)
RBC # BLD: 3.9 MIL/MCL (ref 4–5.2)
WBC # BLD: 5.1 K/MCL (ref 4.2–11)

## 2023-04-07 PROCEDURE — 36415 COLL VENOUS BLD VENIPUNCTURE: CPT | Performed by: INTERNAL MEDICINE

## 2023-04-07 PROCEDURE — 93000 ELECTROCARDIOGRAM COMPLETE: CPT | Performed by: NURSE PRACTITIONER

## 2023-04-07 PROCEDURE — 80048 BASIC METABOLIC PNL TOTAL CA: CPT | Performed by: INTERNAL MEDICINE

## 2023-04-07 PROCEDURE — 85025 COMPLETE CBC W/AUTO DIFF WBC: CPT | Performed by: INTERNAL MEDICINE

## 2023-04-07 PROCEDURE — 99213 OFFICE O/P EST LOW 20 MIN: CPT | Performed by: NURSE PRACTITIONER

## 2023-04-08 LAB
ANION GAP SERPL CALC-SCNC: 9 MMOL/L (ref 7–19)
BUN SERPL-MCNC: 16 MG/DL (ref 6–20)
BUN/CREAT SERPL: 22 (ref 7–25)
CALCIUM SERPL-MCNC: 9.7 MG/DL (ref 8.4–10.2)
CHLORIDE SERPL-SCNC: 106 MMOL/L (ref 97–110)
CO2 SERPL-SCNC: 28 MMOL/L (ref 21–32)
CREAT SERPL-MCNC: 0.74 MG/DL (ref 0.51–0.95)
EKG IMPRESSION: NORMAL
FASTING DURATION TIME PATIENT: 7 HOURS (ref 0–999)
GFR SERPLBLD BASED ON 1.73 SQ M-ARVRAT: >90 ML/MIN
GLUCOSE SERPL-MCNC: 86 MG/DL (ref 70–99)
POTASSIUM SERPL-SCNC: 3.9 MMOL/L (ref 3.4–5.1)
SODIUM SERPL-SCNC: 139 MMOL/L (ref 135–145)

## 2023-04-17 ENCOUNTER — NON-APPOINTMENT (OUTPATIENT)
Age: 53
End: 2023-04-17

## 2023-04-17 LAB — CORE LAB BIOPSY: NORMAL

## 2023-04-18 ENCOUNTER — OUTPATIENT (OUTPATIENT)
Dept: OUTPATIENT SERVICES | Facility: HOSPITAL | Age: 53
LOS: 1 days | Discharge: HOME | End: 2023-04-18
Payer: COMMERCIAL

## 2023-04-18 ENCOUNTER — APPOINTMENT (OUTPATIENT)
Dept: GASTROENTEROLOGY | Facility: HOSPITAL | Age: 53
End: 2023-04-18

## 2023-04-18 VITALS
SYSTOLIC BLOOD PRESSURE: 158 MMHG | RESPIRATION RATE: 26 BRPM | WEIGHT: 24.03 LBS | HEIGHT: 65 IN | HEART RATE: 100 BPM | TEMPERATURE: 98 F | DIASTOLIC BLOOD PRESSURE: 93 MMHG

## 2023-04-18 DIAGNOSIS — Z12.11 ENCOUNTER FOR SCREENING FOR MALIGNANT NEOPLASM OF COLON: ICD-10-CM

## 2023-04-18 DIAGNOSIS — Z98.890 OTHER SPECIFIED POSTPROCEDURAL STATES: Chronic | ICD-10-CM

## 2023-04-18 DIAGNOSIS — Z98.51 TUBAL LIGATION STATUS: Chronic | ICD-10-CM

## 2023-04-18 PROCEDURE — 45378 DIAGNOSTIC COLONOSCOPY: CPT

## 2023-04-18 NOTE — ASU PATIENT PROFILE, ADULT - FALL HARM RISK - UNIVERSAL INTERVENTIONS
Bed in lowest position, wheels locked, appropriate side rails in place/Call bell, personal items and telephone in reach/Instruct patient to call for assistance before getting out of bed or chair/Non-slip footwear when patient is out of bed/[place] to call system/Physically safe environment - no spills, clutter or unnecessary equipment/Purposeful Proactive Rounding/Room/bathroom lighting operational, light cord in reach

## 2023-04-20 DIAGNOSIS — Z12.11 ENCOUNTER FOR SCREENING FOR MALIGNANT NEOPLASM OF COLON: ICD-10-CM

## 2023-04-20 DIAGNOSIS — K64.8 OTHER HEMORRHOIDS: ICD-10-CM

## 2023-04-20 DIAGNOSIS — E11.9 TYPE 2 DIABETES MELLITUS WITHOUT COMPLICATIONS: ICD-10-CM

## 2023-04-20 DIAGNOSIS — Z85.3 PERSONAL HISTORY OF MALIGNANT NEOPLASM OF BREAST: ICD-10-CM

## 2023-04-20 DIAGNOSIS — I10 ESSENTIAL (PRIMARY) HYPERTENSION: ICD-10-CM

## 2023-04-20 DIAGNOSIS — K57.30 DIVERTICULOSIS OF LARGE INTESTINE WITHOUT PERFORATION OR ABSCESS WITHOUT BLEEDING: ICD-10-CM

## 2023-04-25 ENCOUNTER — APPOINTMENT (OUTPATIENT)
Dept: DERMATOLOGY | Facility: CLINIC | Age: 53
End: 2023-04-25
Payer: COMMERCIAL

## 2023-04-25 DIAGNOSIS — R21 RASH AND OTHER NONSPECIFIC SKIN ERUPTION: ICD-10-CM

## 2023-04-25 DIAGNOSIS — L90.5 SCAR CONDITIONS AND FIBROSIS OF SKIN: ICD-10-CM

## 2023-04-25 PROCEDURE — 99214 OFFICE O/P EST MOD 30 MIN: CPT

## 2023-04-25 RX ORDER — TRIAMCINOLONE ACETONIDE 1 MG/G
0.1 OINTMENT TOPICAL TWICE DAILY
Qty: 1 | Refills: 1 | Status: ACTIVE | COMMUNITY
Start: 2023-04-25 | End: 1900-01-01

## 2023-04-25 NOTE — HISTORY OF PRESENT ILLNESS
[FreeTextEntry1] : f/u bx [de-identified] : 52 year old female here for f/u bx. consistent with scar.

## 2023-04-25 NOTE — ASSESSMENT
[FreeTextEntry1] : sutures removed\par results discussed\par healing well\par \par \par results non specific; no malignancy\par plan for TAC BID x2 weeks; SED\par scar will not resolve with TAC

## 2023-05-17 ENCOUNTER — E-ADVICE (OUTPATIENT)
Dept: FAMILY MEDICINE | Age: 53
End: 2023-05-17

## 2023-05-18 ENCOUNTER — LAB SERVICES (OUTPATIENT)
Dept: LAB | Age: 53
End: 2023-05-18

## 2023-05-18 DIAGNOSIS — K76.0 HEPATIC STEATOSIS: ICD-10-CM

## 2023-05-18 DIAGNOSIS — R73.03 PREDIABETES: ICD-10-CM

## 2023-05-18 DIAGNOSIS — E78.5 DYSLIPIDEMIA: ICD-10-CM

## 2023-05-18 LAB
ALBUMIN SERPL-MCNC: 3.6 G/DL (ref 3.6–5.1)
ALP SERPL-CCNC: 117 UNITS/L (ref 45–117)
ALT SERPL-CCNC: 61 UNITS/L
AST SERPL-CCNC: 32 UNITS/L
BILIRUB CONJ SERPL-MCNC: <0.1 MG/DL (ref 0–0.2)
BILIRUB SERPL-MCNC: 0.4 MG/DL (ref 0.2–1)
CHOLEST SERPL-MCNC: 204 MG/DL
CHOLEST/HDLC SERPL: 3.7 {RATIO}
HBA1C MFR BLD: 5 % (ref 4.5–5.6)
HDLC SERPL-MCNC: 55 MG/DL
LDLC SERPL CALC-MCNC: 100 MG/DL
NONHDLC SERPL-MCNC: 149 MG/DL
PROT SERPL-MCNC: 6.6 G/DL (ref 6.4–8.2)
TRIGL SERPL-MCNC: 243 MG/DL

## 2023-05-18 PROCEDURE — 36415 COLL VENOUS BLD VENIPUNCTURE: CPT | Performed by: INTERNAL MEDICINE

## 2023-05-18 PROCEDURE — 80061 LIPID PANEL: CPT | Performed by: INTERNAL MEDICINE

## 2023-05-18 PROCEDURE — 80076 HEPATIC FUNCTION PANEL: CPT | Performed by: INTERNAL MEDICINE

## 2023-05-18 PROCEDURE — 83036 HEMOGLOBIN GLYCOSYLATED A1C: CPT | Performed by: INTERNAL MEDICINE

## 2023-05-22 ENCOUNTER — TELEPHONE (OUTPATIENT)
Dept: FAMILY MEDICINE | Age: 53
End: 2023-05-22

## 2023-05-31 ENCOUNTER — TELEPHONE (OUTPATIENT)
Dept: FAMILY MEDICINE | Age: 53
End: 2023-05-31

## 2023-06-02 ENCOUNTER — NURSE ONLY (OUTPATIENT)
Dept: FAMILY MEDICINE | Age: 53
End: 2023-06-02

## 2023-06-02 DIAGNOSIS — Z79.899 MEDICATION MANAGEMENT: Primary | ICD-10-CM

## 2023-06-02 LAB — EKG IMPRESSION: NORMAL

## 2023-06-02 PROCEDURE — 93000 ELECTROCARDIOGRAM COMPLETE: CPT | Performed by: NURSE PRACTITIONER

## 2023-06-02 ASSESSMENT — PATIENT HEALTH QUESTIONNAIRE - PHQ9
CLINICAL INTERPRETATION OF PHQ2 SCORE: NO FURTHER SCREENING NEEDED
SUM OF ALL RESPONSES TO PHQ9 QUESTIONS 1 AND 2: 0
SUM OF ALL RESPONSES TO PHQ9 QUESTIONS 1 AND 2: 0
1. LITTLE INTEREST OR PLEASURE IN DOING THINGS: NOT AT ALL
2. FEELING DOWN, DEPRESSED OR HOPELESS: NOT AT ALL

## 2023-06-05 ENCOUNTER — TELEPHONE (OUTPATIENT)
Dept: FAMILY MEDICINE | Age: 53
End: 2023-06-05

## 2023-06-05 ENCOUNTER — OFFICE VISIT (OUTPATIENT)
Dept: FAMILY MEDICINE | Age: 53
End: 2023-06-05

## 2023-06-05 VITALS
HEART RATE: 77 BPM | SYSTOLIC BLOOD PRESSURE: 122 MMHG | WEIGHT: 150.4 LBS | DIASTOLIC BLOOD PRESSURE: 82 MMHG | OXYGEN SATURATION: 98 % | BODY MASS INDEX: 29.37 KG/M2

## 2023-06-05 DIAGNOSIS — R73.03 PREDIABETES: ICD-10-CM

## 2023-06-05 DIAGNOSIS — K21.9 GASTROESOPHAGEAL REFLUX DISEASE WITHOUT ESOPHAGITIS: ICD-10-CM

## 2023-06-05 DIAGNOSIS — Z00.00 ANNUAL PHYSICAL EXAM: Primary | ICD-10-CM

## 2023-06-05 DIAGNOSIS — E78.5 DYSLIPIDEMIA: Primary | ICD-10-CM

## 2023-06-05 PROCEDURE — 99214 OFFICE O/P EST MOD 30 MIN: CPT | Performed by: FAMILY MEDICINE

## 2023-06-05 RX ORDER — PANTOPRAZOLE SODIUM 20 MG/1
20 TABLET, DELAYED RELEASE ORAL DAILY
Qty: 90 TABLET | Refills: 1 | Status: SHIPPED | OUTPATIENT
Start: 2023-06-05

## 2023-06-05 RX ORDER — ROSUVASTATIN CALCIUM 10 MG/1
10 TABLET, COATED ORAL DAILY
Qty: 90 TABLET | Refills: 1 | Status: SHIPPED | OUTPATIENT
Start: 2023-06-05

## 2023-06-12 ENCOUNTER — E-ADVICE (OUTPATIENT)
Dept: FAMILY MEDICINE | Age: 53
End: 2023-06-12

## 2023-06-14 RX ORDER — PEN NEEDLE, DIABETIC 30 GX3/16"
1 NEEDLE, DISPOSABLE MISCELLANEOUS DAILY
Qty: 90 EACH | Refills: 3 | Status: SHIPPED | OUTPATIENT
Start: 2023-06-14 | End: 2023-07-14 | Stop reason: ALTCHOICE

## 2023-06-16 ENCOUNTER — NURSE ONLY (OUTPATIENT)
Dept: FAMILY MEDICINE | Age: 53
End: 2023-06-16

## 2023-06-16 VITALS — WEIGHT: 153.2 LBS | BODY MASS INDEX: 29.92 KG/M2

## 2023-06-16 DIAGNOSIS — Z79.899 MEDICATION MANAGEMENT: Primary | ICD-10-CM

## 2023-06-16 RX ORDER — DIAZEPAM 5 MG/1
TABLET ORAL
COMMUNITY
Start: 2023-04-17 | End: 2023-07-14 | Stop reason: ALTCHOICE

## 2023-06-16 RX ORDER — SULFAMETHOXAZOLE AND TRIMETHOPRIM 800; 160 MG/1; MG/1
1 TABLET ORAL 2 TIMES DAILY
COMMUNITY
Start: 2023-05-08 | End: 2023-07-14 | Stop reason: ALTCHOICE

## 2023-06-16 RX ORDER — CEPHALEXIN 500 MG/1
CAPSULE ORAL
COMMUNITY
Start: 2023-05-01 | End: 2023-07-14 | Stop reason: ALTCHOICE

## 2023-06-16 RX ORDER — ONDANSETRON 4 MG/1
TABLET, ORALLY DISINTEGRATING ORAL
COMMUNITY
Start: 2023-04-18 | End: 2023-07-14 | Stop reason: ALTCHOICE

## 2023-06-16 RX ORDER — OXYCODONE HYDROCHLORIDE 5 MG/1
TABLET ORAL
COMMUNITY
Start: 2023-04-21 | End: 2023-07-14 | Stop reason: ALTCHOICE

## 2023-06-20 ENCOUNTER — TELEPHONE (OUTPATIENT)
Dept: OTHER | Age: 53
End: 2023-06-20

## 2023-07-14 ENCOUNTER — OFFICE VISIT (OUTPATIENT)
Dept: FAMILY MEDICINE | Age: 53
End: 2023-07-14

## 2023-07-14 ENCOUNTER — TELEPHONE (OUTPATIENT)
Dept: FAMILY MEDICINE | Age: 53
End: 2023-07-14

## 2023-07-14 VITALS
HEART RATE: 81 BPM | OXYGEN SATURATION: 98 % | SYSTOLIC BLOOD PRESSURE: 112 MMHG | WEIGHT: 146.2 LBS | BODY MASS INDEX: 28.55 KG/M2 | DIASTOLIC BLOOD PRESSURE: 78 MMHG

## 2023-07-14 DIAGNOSIS — R73.03 PREDIABETES: ICD-10-CM

## 2023-07-14 DIAGNOSIS — K76.0 HEPATIC STEATOSIS: ICD-10-CM

## 2023-07-14 DIAGNOSIS — Z79.899 MEDICATION MANAGEMENT: Primary | ICD-10-CM

## 2023-07-14 DIAGNOSIS — E78.5 DYSLIPIDEMIA: ICD-10-CM

## 2023-07-14 DIAGNOSIS — Z86.39 HISTORY OF OBESITY: ICD-10-CM

## 2023-07-14 PROCEDURE — 99213 OFFICE O/P EST LOW 20 MIN: CPT | Performed by: NURSE PRACTITIONER

## 2023-07-14 ASSESSMENT — PATIENT HEALTH QUESTIONNAIRE - PHQ9
SUM OF ALL RESPONSES TO PHQ9 QUESTIONS 1 AND 2: 0
1. LITTLE INTEREST OR PLEASURE IN DOING THINGS: NOT AT ALL
SUM OF ALL RESPONSES TO PHQ9 QUESTIONS 1 AND 2: 0
2. FEELING DOWN, DEPRESSED OR HOPELESS: NOT AT ALL
CLINICAL INTERPRETATION OF PHQ2 SCORE: NO FURTHER SCREENING NEEDED

## 2023-07-15 ENCOUNTER — APPOINTMENT (OUTPATIENT)
Dept: MAMMOGRAPHY | Facility: CLINIC | Age: 53
End: 2023-07-15
Payer: COMMERCIAL

## 2023-07-15 ENCOUNTER — OUTPATIENT (OUTPATIENT)
Dept: OUTPATIENT SERVICES | Facility: HOSPITAL | Age: 53
LOS: 1 days | End: 2023-07-15
Payer: COMMERCIAL

## 2023-07-15 DIAGNOSIS — Z98.890 OTHER SPECIFIED POSTPROCEDURAL STATES: Chronic | ICD-10-CM

## 2023-07-15 DIAGNOSIS — Z00.8 ENCOUNTER FOR OTHER GENERAL EXAMINATION: ICD-10-CM

## 2023-07-15 DIAGNOSIS — Z98.51 TUBAL LIGATION STATUS: Chronic | ICD-10-CM

## 2023-07-15 PROCEDURE — 77063 BREAST TOMOSYNTHESIS BI: CPT

## 2023-07-15 PROCEDURE — 77067 SCR MAMMO BI INCL CAD: CPT | Mod: 26

## 2023-07-15 PROCEDURE — 77067 SCR MAMMO BI INCL CAD: CPT

## 2023-07-15 PROCEDURE — 77063 BREAST TOMOSYNTHESIS BI: CPT | Mod: 26

## 2023-07-28 ENCOUNTER — APPOINTMENT (OUTPATIENT)
Dept: SURGICAL ONCOLOGY | Facility: CLINIC | Age: 53
End: 2023-07-28
Payer: COMMERCIAL

## 2023-07-28 VITALS
WEIGHT: 240 LBS | SYSTOLIC BLOOD PRESSURE: 145 MMHG | BODY MASS INDEX: 40.97 KG/M2 | RESPIRATION RATE: 16 BRPM | DIASTOLIC BLOOD PRESSURE: 87 MMHG | HEIGHT: 64 IN

## 2023-07-28 VITALS — OXYGEN SATURATION: 92 % | HEART RATE: 102 BPM

## 2023-07-28 VITALS — OXYGEN SATURATION: 95 %

## 2023-07-28 PROCEDURE — 99215 OFFICE O/P EST HI 40 MIN: CPT

## 2023-07-28 NOTE — ASSESSMENT
[FreeTextEntry1] : Stage I right breast cancer \par JOSE\par BI-RADS 2 mammogram July 2023\par Continue Tamoxifen as per Dr. Li of medical oncology\par Continue yearly breast imaging July 2024\par RTO 1 year

## 2023-07-28 NOTE — ADDENDUM
[FreeTextEntry1] : I, Mercedes Ndiaye, acted solely as a scribe for Dr. Luis Mazariegos on this date 07/28/2023.

## 2023-07-28 NOTE — PHYSICAL EXAM
[Normal] : supple, no neck mass and thyroid not enlarged [Normal Neck Lymph Nodes] : normal neck lymph nodes  [Normal Supraclavicular Lymph Nodes] : normal supraclavicular lymph nodes [Normal Groin Lymph Nodes] : normal groin lymph nodes [Normal Axillary Lymph Nodes] : normal axillary lymph nodes [Normal] : oriented to person, place and time, with appropriate affect [de-identified] : right breast lumpectomy scars well healed. mild post radiation changes. no masses or adenopathy bilaterally.

## 2023-07-28 NOTE — HISTORY OF PRESENT ILLNESS
[de-identified] : Patient is a 53 y/o female presents a follow up for stage 1 right breast cancer. Pt used  on her visit. \par She is s/p right breast lumpectomy post Lali  reflector placement, right axillary/sentinel node biopsy on 7/17/18. 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 (ER/HI+, Her-2neu-)  Oncotype Dx: 16 Genetic testing panel negative.\par Completed adjuvant radiation therapy 12/11/18.\par Currently on Tamoxifen as per Dr. Gina Li of medical oncology, denies hot flashes or any joint pain. \par \par Bilateral mammogram on 7/15/23 showed no mammographic evidence of malignancy (BI-RADS 2)\par \par Bilateral mammogram performed on 7/8/22 showed no mammographic or sonographic evidence of malignancy. (BI-RADS 2)\par \par MMG/US 6/2/21: No mammographic evidence of malignancy. Left breast cysts. Recommendation of mammography in 1 year. (BI-RADS 2)\par \par Mammogram (11/3/20) revealed bilateral stable probable benign calcifications. Follow-up diagnostic mammogram in 6 months is recommended at the time of annual exam. BI-RADS 3. \par \par Bilateral MMG/US 3/26/19: multiple bilateral cysts. No evidence of malignancy. BI-RADS 2.  \par US Pelvic & Transvaginal (7/13/19): Normal pelvic sonogram.\par \par Denies any previous breast biopsies. \par No family history of breast or ovarian cancer.\par She is Covid vaccinated w/ booster dose.

## 2023-08-14 ENCOUNTER — TELEPHONE (OUTPATIENT)
Dept: FAMILY MEDICINE | Age: 53
End: 2023-08-14

## 2023-09-06 ENCOUNTER — OUTPATIENT (OUTPATIENT)
Dept: OUTPATIENT SERVICES | Facility: HOSPITAL | Age: 53
LOS: 1 days | Discharge: HOME | End: 2023-09-06

## 2023-09-06 DIAGNOSIS — Z98.890 OTHER SPECIFIED POSTPROCEDURAL STATES: Chronic | ICD-10-CM

## 2023-09-06 DIAGNOSIS — Z98.51 TUBAL LIGATION STATUS: Chronic | ICD-10-CM

## 2023-09-06 DIAGNOSIS — C50.919 MALIGNANT NEOPLASM OF UNSPECIFIED SITE OF UNSPECIFIED FEMALE BREAST: ICD-10-CM

## 2023-09-15 ENCOUNTER — APPOINTMENT (OUTPATIENT)
Dept: HEMATOLOGY ONCOLOGY | Facility: CLINIC | Age: 53
End: 2023-09-15
Payer: COMMERCIAL

## 2023-09-15 VITALS
DIASTOLIC BLOOD PRESSURE: 80 MMHG | WEIGHT: 238.1 LBS | OXYGEN SATURATION: 98 % | RESPIRATION RATE: 16 BRPM | SYSTOLIC BLOOD PRESSURE: 134 MMHG | BODY MASS INDEX: 40.87 KG/M2 | TEMPERATURE: 97.2 F | HEART RATE: 100 BPM

## 2023-09-15 DIAGNOSIS — R79.89 OTHER SPECIFIED ABNORMAL FINDINGS OF BLOOD CHEMISTRY: ICD-10-CM

## 2023-09-15 PROCEDURE — 99213 OFFICE O/P EST LOW 20 MIN: CPT

## 2023-09-15 RX ORDER — VALACYCLOVIR 1 G/1
1 TABLET, FILM COATED ORAL 3 TIMES DAILY
Qty: 30 | Refills: 0 | Status: DISCONTINUED | COMMUNITY
Start: 2023-01-27 | End: 2023-09-15

## 2023-10-18 ENCOUNTER — APPOINTMENT (OUTPATIENT)
Dept: FAMILY MEDICINE | Age: 53
End: 2023-10-18

## 2023-11-08 ENCOUNTER — LAB SERVICES (OUTPATIENT)
Dept: LAB | Age: 53
End: 2023-11-08

## 2023-11-08 ENCOUNTER — OFFICE VISIT (OUTPATIENT)
Dept: FAMILY MEDICINE | Age: 53
End: 2023-11-08

## 2023-11-08 VITALS
BODY MASS INDEX: 28.26 KG/M2 | DIASTOLIC BLOOD PRESSURE: 70 MMHG | HEART RATE: 78 BPM | HEIGHT: 59 IN | SYSTOLIC BLOOD PRESSURE: 104 MMHG | OXYGEN SATURATION: 97 % | TEMPERATURE: 98.5 F | WEIGHT: 140.2 LBS

## 2023-11-08 DIAGNOSIS — Z01.818 PRE-OP EVALUATION: ICD-10-CM

## 2023-11-08 DIAGNOSIS — Z01.818 PRE-OP EVALUATION: Primary | ICD-10-CM

## 2023-11-08 DIAGNOSIS — E78.5 DYSLIPIDEMIA: ICD-10-CM

## 2023-11-08 PROCEDURE — 80053 COMPREHEN METABOLIC PANEL: CPT | Performed by: CLINICAL MEDICAL LABORATORY

## 2023-11-08 PROCEDURE — 36415 COLL VENOUS BLD VENIPUNCTURE: CPT | Performed by: INTERNAL MEDICINE

## 2023-11-08 PROCEDURE — 85025 COMPLETE CBC W/AUTO DIFF WBC: CPT | Performed by: CLINICAL MEDICAL LABORATORY

## 2023-11-08 PROCEDURE — 83036 HEMOGLOBIN GLYCOSYLATED A1C: CPT | Performed by: CLINICAL MEDICAL LABORATORY

## 2023-11-08 PROCEDURE — 99213 OFFICE O/P EST LOW 20 MIN: CPT | Performed by: NURSE PRACTITIONER

## 2023-11-08 ASSESSMENT — PATIENT HEALTH QUESTIONNAIRE - PHQ9
2. FEELING DOWN, DEPRESSED OR HOPELESS: NOT AT ALL
SUM OF ALL RESPONSES TO PHQ9 QUESTIONS 1 AND 2: 0
SUM OF ALL RESPONSES TO PHQ9 QUESTIONS 1 AND 2: 0
CLINICAL INTERPRETATION OF PHQ2 SCORE: NO FURTHER SCREENING NEEDED
1. LITTLE INTEREST OR PLEASURE IN DOING THINGS: NOT AT ALL

## 2023-11-09 LAB
ALBUMIN SERPL-MCNC: 4.1 G/DL (ref 3.6–5.1)
ALBUMIN/GLOB SERPL: 1.4 {RATIO} (ref 1–2.4)
ALP SERPL-CCNC: 150 UNITS/L (ref 45–117)
ALT SERPL-CCNC: 37 UNITS/L
ANION GAP SERPL CALC-SCNC: 9 MMOL/L (ref 7–19)
AST SERPL-CCNC: 24 UNITS/L
BASOPHILS # BLD: 0 K/MCL (ref 0–0.3)
BASOPHILS NFR BLD: 0 %
BILIRUB SERPL-MCNC: 0.4 MG/DL (ref 0.2–1)
BUN SERPL-MCNC: 14 MG/DL (ref 6–20)
BUN/CREAT SERPL: 18 (ref 7–25)
CALCIUM SERPL-MCNC: 9.3 MG/DL (ref 8.4–10.2)
CHLORIDE SERPL-SCNC: 103 MMOL/L (ref 97–110)
CO2 SERPL-SCNC: 32 MMOL/L (ref 21–32)
CREAT SERPL-MCNC: 0.79 MG/DL (ref 0.51–0.95)
DEPRECATED RDW RBC: 40 FL (ref 39–50)
EGFRCR SERPLBLD CKD-EPI 2021: 89 ML/MIN/{1.73_M2}
EOSINOPHIL # BLD: 0.2 K/MCL (ref 0–0.5)
EOSINOPHIL NFR BLD: 2 %
ERYTHROCYTE [DISTWIDTH] IN BLOOD: 12.4 % (ref 11–15)
FASTING DURATION TIME PATIENT: ABNORMAL H
GLOBULIN SER-MCNC: 3 G/DL (ref 2–4)
GLUCOSE SERPL-MCNC: 93 MG/DL (ref 70–99)
HBA1C MFR BLD: 4.5 % (ref 4.5–5.6)
HCT VFR BLD CALC: 37.4 % (ref 36–46.5)
HGB BLD-MCNC: 13.3 G/DL (ref 12–15.5)
IMM GRANULOCYTES # BLD AUTO: 0 K/MCL (ref 0–0.2)
IMM GRANULOCYTES # BLD: 0 %
LYMPHOCYTES # BLD: 2.5 K/MCL (ref 1–4)
LYMPHOCYTES NFR BLD: 37 %
MCH RBC QN AUTO: 31.5 PG (ref 26–34)
MCHC RBC AUTO-ENTMCNC: 35.6 G/DL (ref 32–36.5)
MCV RBC AUTO: 88.6 FL (ref 78–100)
MONOCYTES # BLD: 0.5 K/MCL (ref 0.3–0.9)
MONOCYTES NFR BLD: 8 %
NEUTROPHILS # BLD: 3.6 K/MCL (ref 1.8–7.7)
NEUTROPHILS NFR BLD: 53 %
NRBC BLD MANUAL-RTO: 0 /100 WBC
PLATELET # BLD AUTO: 297 K/MCL (ref 140–450)
POTASSIUM SERPL-SCNC: 4 MMOL/L (ref 3.4–5.1)
PROT SERPL-MCNC: 7.1 G/DL (ref 6.4–8.2)
RBC # BLD: 4.22 MIL/MCL (ref 4–5.2)
SODIUM SERPL-SCNC: 140 MMOL/L (ref 135–145)
WBC # BLD: 6.9 K/MCL (ref 4.2–11)

## 2023-11-15 ENCOUNTER — TELEPHONE (OUTPATIENT)
Dept: FAMILY MEDICINE | Age: 53
End: 2023-11-15

## 2023-11-25 ENCOUNTER — APPOINTMENT (OUTPATIENT)
Dept: ULTRASOUND IMAGING | Facility: CLINIC | Age: 53
End: 2023-11-25
Payer: COMMERCIAL

## 2023-11-25 ENCOUNTER — OUTPATIENT (OUTPATIENT)
Dept: OUTPATIENT SERVICES | Facility: HOSPITAL | Age: 53
LOS: 1 days | End: 2023-11-25
Payer: COMMERCIAL

## 2023-11-25 DIAGNOSIS — Z98.51 TUBAL LIGATION STATUS: Chronic | ICD-10-CM

## 2023-11-25 DIAGNOSIS — Z98.890 OTHER SPECIFIED POSTPROCEDURAL STATES: Chronic | ICD-10-CM

## 2023-11-25 DIAGNOSIS — Z01.419 ENCOUNTER FOR GYNECOLOGICAL EXAMINATION (GENERAL) (ROUTINE) WITHOUT ABNORMAL FINDINGS: ICD-10-CM

## 2023-11-25 PROCEDURE — 76830 TRANSVAGINAL US NON-OB: CPT | Mod: 26

## 2023-11-25 PROCEDURE — 76856 US EXAM PELVIC COMPLETE: CPT

## 2023-11-25 PROCEDURE — 76856 US EXAM PELVIC COMPLETE: CPT | Mod: 26

## 2023-11-25 PROCEDURE — 76830 TRANSVAGINAL US NON-OB: CPT

## 2023-12-05 ENCOUNTER — APPOINTMENT (OUTPATIENT)
Dept: FAMILY MEDICINE | Age: 53
End: 2023-12-05

## 2023-12-18 NOTE — ASU PATIENT PROFILE, ADULT - NSTOBACCO TYPE_GEN_A_CORE_RD
PODIATRIC SURGERY POST-OP INSTRUCTIONS    POST-OP INSTRUCTIONS  You must have a responsible adult drive you home and stay with you for the first 24 hours.    Do not drive a car or drink any alcohol for 24 hours after surgery.  You may have mild nausea, a sore throat or raspy voice for a few days.    Keep dressing clean, dry and intact until your first post-operative appointment.  Do not shower unless using a cast protector to protect dressing.  If dressing gets wet, please call office immediately as this can lead to increased risk of infection or wound dehiscence.   Elevate surgical extremity as much as possible to help with pain and swelling.  Place ice pack behind knee of surgical extremity.  Bear weight as tolerated in surgical shoe.  Norco 5/325 mg every 6 hours by mouth as needed for pain  Please call to schedule post-operative appointment with Dr. France for 1 week.  Should any problems, questions, or concerns arise, please call the clinic office.    WHEN TO CALL YOUR DOCTOR:  Fever (>100.4°F or 38.0°C) or chills.  Incision problems such as redness, warmth, swelling, or foul smelling drainage.  Severe nausea or persistent vomiting.  Pain and swelling in your legs, especially if it is only on one side and not the other.  Pain with urination, cloudy urine, or foul smelling urine.  Or if you have any other problems or questions.  CALL 911 or go to the ED if you have any shortness of breath, difficulty breathing, or chest pain.    Cyrus France DPM  [facility]  [address]. [address]  [place] [id number] [phone]     Cigarettes

## 2024-01-03 ENCOUNTER — EMERGENCY (EMERGENCY)
Facility: HOSPITAL | Age: 54
LOS: 1 days | Discharge: HOME | End: 2024-01-03
Attending: EMERGENCY MEDICINE | Admitting: EMERGENCY MEDICINE
Payer: COMMERCIAL

## 2024-01-03 VITALS
HEIGHT: 65 IN | OXYGEN SATURATION: 97 % | SYSTOLIC BLOOD PRESSURE: 159 MMHG | RESPIRATION RATE: 18 BRPM | DIASTOLIC BLOOD PRESSURE: 84 MMHG | WEIGHT: 293 LBS | TEMPERATURE: 99 F | HEART RATE: 115 BPM

## 2024-01-03 VITALS
OXYGEN SATURATION: 98 % | RESPIRATION RATE: 14 BRPM | DIASTOLIC BLOOD PRESSURE: 81 MMHG | TEMPERATURE: 98 F | SYSTOLIC BLOOD PRESSURE: 145 MMHG | HEART RATE: 112 BPM

## 2024-01-03 DIAGNOSIS — Z98.51 TUBAL LIGATION STATUS: Chronic | ICD-10-CM

## 2024-01-03 DIAGNOSIS — Z98.890 OTHER SPECIFIED POSTPROCEDURAL STATES: Chronic | ICD-10-CM

## 2024-01-03 LAB
FLUAV AG NPH QL: SIGNIFICANT CHANGE UP
FLUAV AG NPH QL: SIGNIFICANT CHANGE UP
FLUBV AG NPH QL: SIGNIFICANT CHANGE UP
FLUBV AG NPH QL: SIGNIFICANT CHANGE UP
RSV RNA NPH QL NAA+NON-PROBE: SIGNIFICANT CHANGE UP
RSV RNA NPH QL NAA+NON-PROBE: SIGNIFICANT CHANGE UP
SARS-COV-2 RNA SPEC QL NAA+PROBE: SIGNIFICANT CHANGE UP
SARS-COV-2 RNA SPEC QL NAA+PROBE: SIGNIFICANT CHANGE UP

## 2024-01-03 PROCEDURE — 99284 EMERGENCY DEPT VISIT MOD MDM: CPT

## 2024-01-03 PROCEDURE — 99283 EMERGENCY DEPT VISIT LOW MDM: CPT

## 2024-01-03 PROCEDURE — 87637 SARSCOV2&INF A&B&RSV AMP PRB: CPT

## 2024-01-03 RX ORDER — CYCLOBENZAPRINE HYDROCHLORIDE 10 MG/1
1 TABLET, FILM COATED ORAL
Qty: 0 | Refills: 0 | DISCHARGE

## 2024-01-03 RX ORDER — ATENOLOL 25 MG/1
1 TABLET ORAL
Qty: 0 | Refills: 0 | DISCHARGE

## 2024-01-03 RX ORDER — TRIAMTERENE/HYDROCHLOROTHIAZID 75 MG-50MG
1 TABLET ORAL
Qty: 0 | Refills: 0 | DISCHARGE

## 2024-01-03 RX ORDER — ASPIRIN/CALCIUM CARB/MAGNESIUM 324 MG
1 TABLET ORAL
Qty: 0 | Refills: 0 | DISCHARGE

## 2024-01-03 RX ORDER — IBUPROFEN 200 MG
600 TABLET ORAL ONCE
Refills: 0 | Status: COMPLETED | OUTPATIENT
Start: 2024-01-03 | End: 2024-01-03

## 2024-01-03 RX ORDER — AMLODIPINE BESYLATE 2.5 MG/1
1 TABLET ORAL
Qty: 0 | Refills: 2 | DISCHARGE

## 2024-01-03 RX ORDER — TAMOXIFEN CITRATE 20 MG/1
1 TABLET, FILM COATED ORAL
Qty: 0 | Refills: 0 | DISCHARGE

## 2024-01-03 RX ADMIN — Medication 600 MILLIGRAM(S): at 11:24

## 2024-01-03 RX ADMIN — Medication 600 MILLIGRAM(S): at 11:00

## 2024-01-03 NOTE — ED PROVIDER NOTE - NSFOLLOWUPINSTRUCTIONS_ED_ALL_ED_FT
Follow up with your primary care physician within 2-3 days. Take the copy of your test results you were given in the emergency room for your doctor to review.     Take over the counter tylenol or motrin as needed for bodyaches and fevers    Stay hydrated    Return to the ER if your symptoms worsen or for any other medical emergencies  *****************    Enfermedades virales en los adultos  Viral Illness, Adult  Los virus son microbios diminutos que entran en el organismo de maritza persona y causan enfermedades. Hay muchos tipos de virus diferentes y causan muchas clases de enfermedades. Las enfermedades virales pueden ser leves o graves. Pueden afectar diferentes partes del cuerpo.    Entre las afecciones a corto plazo causadas por virus, se incluyen los resfríos y la gripe. Entre las afecciones a julio plazo causadas por un virus, incluyen el herpes, la culebrilla y la infección por VIH (virus de inmunodeficiencia humana). Se franklin identificado unos pocos virus asociados con determinados tipos de cáncer.    ¿Cuáles son las causas?  Muchos tipos de virus pueden causar enfermedades. Los virus invaden las células del organismo, se multiplican y provocan que las células infectadas funcionen de manera anormal o mueran. Cuando estas células mueren, liberan más virus. Cuando esto ocurre, aparecen síntomas de la enfermedad, y el virus sigue diseminándose a otras células. Si el virus asume la función de la célula, puede hacer que esta se divida y prolifere de manera descontrolada. [place] ocurre cuando un virus causa cáncer.    Los diferentes virus ingresan al organismo de distintas formas. Puede contraer un virus de la siguiente manera:  Al ingerir alimentos o beber agua que franklin entrado en contacto con el virus (están contaminados).  Al inhalar gotitas que maritza persona infectada liberó en el aire al toser o estornudar.  Al tocar maritza superficie contaminada con el virus y luego llevarse la mano a la boca, la nariz o los ojos.  Al ser agustin por un insecto o mordido por un animal que son portadores del virus.  Al tener contacto sexual con maritza persona infectada por el virus.  Al tener contacto con libby o líquidos que contienen el virus, ya sea a través de un marylou abierto o robert maritza transfusión.  Si el virus ingresa al organismo, el sistema de defensa del cuerpo (sistema inmunitario) intentará combatirlo. Puede correr un riesgo más alto de tener maritza enfermedad viral si tiene el sistema inmunitario debilitado.    ¿Cuáles son los signos o síntomas?  Puede tener los siguientes síntomas, dependiendo del tipo de virus y de la ubicación de las células que invade:  Virus del resfrío y de la gripe:  Fiebre.  Dolor de albino.  Dolor de garganta.  Gwendolyn musculares.  Nariz tapada (congestión nasal).  Tos.  Virus del aparato digestivo (gastrointestinales):  Fiebre.  Dolor en el abdomen.  Náuseas.  Diarrea.  Virus hepáticos (hepatitis):  Pérdida del apetito.  Cansancio.  La piel o las partes amador de los ojos se ponen josephine (ictericia).  Virus del cerebro y la médula coto:  Fiebre.  Dolor de albino.  Rigidez en el evert.  Náuseas y vómitos.  Confusión o somnolencia.  Virus de la piel:  Verrugas.  Picazón.  Erupción cutánea.  Virus de transmisión sexual:  Secreción.  Hinchazón.  Enrojecimiento.  Erupción cutánea.  ¿Cómo se diagnostica?  Esta afección se puede diagnosticar en función de lo siguiente:  Síntomas.  Antecedentes médicos.  Examen físico.  Análisis de libby, maritza muestra de mucosidad de los pulmones (muestra de esputo), muestra de heces o un hisopado de líquidos corporales o maritza llaga de la piel (lesión).  ¿Cómo se trata?  Los virus pueden ser difíciles de tratar porque se hospedan en las células. Los antibióticos no tratan los virus porque estos medicamentos no llegan al interior de las células. El tratamiento de maritza enfermedad viral puede incluir lo siguiente:  Descansar y beber abundantes líquidos.  Medicamentos para aliviar los síntomas. Estos pueden incluir medicamentos de venta richard para el dolor y la fiebre, medicamentos para la tos o la congestión y medicamentos para aliviar la diarrea.  Medicamentos antivirales. Estos medicamentos están disponibles únicamente para ciertos tipos de virus.  Algunas enfermedades virales pueden evitarse con vacunas. Un ejemplo frecuente es la vacuna antigripal.    Siga estas instrucciones en altman casa:  Medicamentos    Use los medicamentos de venta richard y los recetados solamente boogie se lo haya indicado el médico.  Si le recetaron un medicamento antiviral, tómelo boogie se lo haya indicado el médico. No deje de lilly el antiviral aunque comience a sentirse mejor.  Infórmese sobre cuándo los antibióticos son necesarios y cuándo no. Los antibióticos no combaten a los virus. Si tiene maritza infección viral y el médico josselyn que también tiene maritza infección bacteriana, o que está en riesgo de contraerla, venkat vez le recete un antibiótico.  No solicite maritza receta para antibióticos si le franklin diagnosticado maritza enfermedad viral. Los antibióticos no harán que se cure más rápidamente.  Lilly antibióticos con frecuencia cuando no son necesarios puede derivar en resistencia a los antibióticos. Cuando esto ocurre, el medicamento pierde altman eficacia contra las bacterias que normalmente combate.  Indicaciones generales      Shea suficiente líquido para mantener la orina de color amarillo pálido.  Descanse todo lo que pueda.  Retome yamini actividades normales según lo indicado por el médico. Pregúntele al médico qué actividades son seguras para usted.  Concurra a todas las visitas de seguimiento boogie se lo haya indicado el médico. [place] es importante.  ¿Cómo se previene?    Para reducir el riesgo de tener maritza enfermedad viral:  Lávese las alfa frecuentemente con agua y jabón robert al menos 20 segundos. Use desinfectante para alfa si no dispone de agua y jabón.  Evite tocarse la nariz, los ojos y la boca, sobre todo si no se lavó las alfa recientemente.  Si un miembro de la todd tiene maritza infección viral, limpie todas las superficies de la casa que puedan kayy estado en contacto con el virus. Use [place] y jabón. También puede usar lejía con agua agregada (diluido).  Manténgase lejos de las personas enfermas con síntomas de maritza infección viral.  No comparta objetos tales boogie cepillos de dientes y botellas de agua con otras personas.  Mantenga las vacunas al día. [place] incluye recibir la vacuna antigripal todos los años.  Siga maritza dieta saludable y descanse lo suficiente.  Comuníquese con un médico si:  Tiene síntomas de maritza enfermedad viral que no desaparecen.  Los síntomas regresan después de kayy desaparecido.  Yamini síntomas empeoran.  Solicite ayuda de inmediato si tiene:  Dificultad para respirar.  Dolor de albino intenso o rigidez en el evert.  Vómitos abundantes o dolor en el abdomen.  Estos síntomas pueden representar un problema grave que constituye maritza emergencia. No espere a maricarmen si los síntomas desaparecen. Solicite atención médica de inmediato. Comuníquese con el servicio de emergencias de altman localidad (911 en los [place]). No conduzca por yamini propios medios hasta el hospital.    Resumen  Los virus son tipos de microbios que entran en el organismo de maritza persona y causan enfermedades. Las enfermedades virales pueden ser leves o graves. Pueden afectar diferentes partes del cuerpo.  Los virus pueden ser difíciles de tratar. Hay medicamentos para aliviar los síntomas y hay algunos medicamentos antivirales.  Si le recetaron un medicamento antiviral, tómelo boogie se lo haya indicado el médico. No deje de lilly el antiviral aunque comience a sentirse mejor.  Comuníquese con un médico si tiene síntomas de maritza enfermedad viral que no desaparecen.  Esta información no tiene boogie fin reemplazar el consejo del médico. Asegúrese de hacerle al médico cualquier pregunta que tenga. Follow up with your primary care physician within 2-3 days. Take the copy of your test results you were given in the emergency room for your doctor to review.     Take over the counter tylenol or motrin as needed for bodyaches and fevers    Stay hydrated    Return to the ER if your symptoms worsen or for any other medical emergencies  *****************    Enfermedades virales en los adultos  Viral Illness, Adult  Los virus son microbios diminutos que entran en el organismo de maritza persona y causan enfermedades. Hay muchos tipos de virus diferentes y causan muchas clases de enfermedades. Las enfermedades virales pueden ser leves o graves. Pueden afectar diferentes partes del cuerpo.    Entre las afecciones a corto plazo causadas por virus, se incluyen los resfríos y la gripe. Entre las afecciones a julio plazo causadas por un virus, incluyen el herpes, la culebrilla y la infección por VIH (virus de inmunodeficiencia humana). Se franklin identificado unos pocos virus asociados con determinados tipos de cáncer.    ¿Cuáles son las causas?  Muchos tipos de virus pueden causar enfermedades. Los virus invaden las células del organismo, se multiplican y provocan que las células infectadas funcionen de manera anormal o mueran. Cuando estas células mueren, liberan más virus. Cuando esto ocurre, aparecen síntomas de la enfermedad, y el virus sigue diseminándose a otras células. Si el virus asume la función de la célula, puede hacer que esta se divida y prolifere de manera descontrolada. [place] ocurre cuando un virus causa cáncer.    Los diferentes virus ingresan al organismo de distintas formas. Puede contraer un virus de la siguiente manera:  Al ingerir alimentos o beber agua que franklin entrado en contacto con el virus (están contaminados).  Al inhalar gotitas que maritza persona infectada liberó en el aire al toser o estornudar.  Al tocar maritza superficie contaminada con el virus y luego llevarse la mano a la boca, la nariz o los ojos.  Al ser agustin por un insecto o mordido por un animal que son portadores del virus.  Al tener contacto sexual con maritza persona infectada por el virus.  Al tener contacto con libby o líquidos que contienen el virus, ya sea a través de un marylou abierto o robert maritza transfusión.  Si el virus ingresa al organismo, el sistema de defensa del cuerpo (sistema inmunitario) intentará combatirlo. Puede correr un riesgo más alto de tener maritza enfermedad viral si tiene el sistema inmunitario debilitado.    ¿Cuáles son los signos o síntomas?  Puede tener los siguientes síntomas, dependiendo del tipo de virus y de la ubicación de las células que invade:  Virus del resfrío y de la gripe:  Fiebre.  Dolor de albino.  Dolor de garganta.  Gwendolyn musculares.  Nariz tapada (congestión nasal).  Tos.  Virus del aparato digestivo (gastrointestinales):  Fiebre.  Dolor en el abdomen.  Náuseas.  Diarrea.  Virus hepáticos (hepatitis):  Pérdida del apetito.  Cansancio.  La piel o las partes amador de los ojos se ponen josephine (ictericia).  Virus del cerebro y la médula coto:  Fiebre.  Dolor de albino.  Rigidez en el evert.  Náuseas y vómitos.  Confusión o somnolencia.  Virus de la piel:  Verrugas.  Picazón.  Erupción cutánea.  Virus de transmisión sexual:  Secreción.  Hinchazón.  Enrojecimiento.  Erupción cutánea.  ¿Cómo se diagnostica?  Esta afección se puede diagnosticar en función de lo siguiente:  Síntomas.  Antecedentes médicos.  Examen físico.  Análisis de libby, maritza muestra de mucosidad de los pulmones (muestra de esputo), muestra de heces o un hisopado de líquidos corporales o maritza llaga de la piel (lesión).  ¿Cómo se trata?  Los virus pueden ser difíciles de tratar porque se hospedan en las células. Los antibióticos no tratan los virus porque estos medicamentos no llegan al interior de las células. El tratamiento de maritza enfermedad viral puede incluir lo siguiente:  Descansar y beber abundantes líquidos.  Medicamentos para aliviar los síntomas. Estos pueden incluir medicamentos de venta richard para el dolor y la fiebre, medicamentos para la tos o la congestión y medicamentos para aliviar la diarrea.  Medicamentos antivirales. Estos medicamentos están disponibles únicamente para ciertos tipos de virus.  Algunas enfermedades virales pueden evitarse con vacunas. Un ejemplo frecuente es la vacuna antigripal.    Siga estas instrucciones en altman casa:  Medicamentos    Use los medicamentos de venta [place] y los recetados solamente boogie se lo haya indicado el médico.  Si le recetaron un medicamento antiviral, tómelo boogie se lo haya indicado el médico. No deje de lilly el antiviral aunque comience a sentirse mejor.  Infórmese sobre cuándo los antibióticos son necesarios y cuándo no. Los antibióticos no combaten a los virus. Si tiene maritza infección viral y el médico josselyn que también tiene maritza infección bacteriana, o que está en riesgo de contraerla, venkat vez le recete un antibiótico.  No solicite maritza receta para antibióticos si le franklin diagnosticado maritza enfermedad viral. Los antibióticos no harán que se cure más rápidamente.  Lilly antibióticos con frecuencia cuando no son necesarios puede derivar en resistencia a los antibióticos. Cuando esto ocurre, el medicamento pierde altman eficacia contra las bacterias que normalmente combate.  Indicaciones generales      Shea suficiente líquido para mantener la orina de color amarillo pálido.  Descanse todo lo que pueda.  Retome yamini actividades normales según lo indicado por el médico. Pregúntele al médico qué actividades son seguras para usted.  Concurra a todas las visitas de seguimiento boogie se lo haya indicado el médico. [place] es importante.  ¿Cómo se previene?    Para reducir el riesgo de tener maritza enfermedad viral:  Lávese las alfa frecuentemente con agua y jabón robert al menos 20 segundos. Use desinfectante para alfa si no dispone de agua y jabón.  Evite tocarse la nariz, los ojos y la boca, sobre todo si no se lavó las alfa recientemente.  Si un miembro de la todd tiene maritza infección viral, limpie todas las superficies de la casa que puedan kayy estado en contacto con el virus. Use [place] y jabón. También puede usar lejía con agua agregada (diluido).  Manténgase lejos de las personas enfermas con síntomas de maritza infección viral.  No comparta objetos tales boogie cepillos de dientes y botellas de agua con otras personas.  Mantenga las vacunas al día. [place] incluye recibir la vacuna antigripal todos los años.  Siga maritza dieta saludable y descanse lo suficiente.  Comuníquese con un médico si:  Tiene síntomas de maritza enfermedad viral que no desaparecen.  Los síntomas regresan después de kayy desaparecido.  Yamini síntomas empeoran.  Solicite ayuda de inmediato si tiene:  Dificultad para respirar.  Dolor de albino intenso o rigidez en el evert.  Vómitos abundantes o dolor en el abdomen.  Estos síntomas pueden representar un problema grave que constituye maritza emergencia. No espere a maricarmen si los síntomas desaparecen. Solicite atención médica de inmediato. Comuníquese con el servicio de emergencias de altman localidad (911 en [place]). No conduzca por yamini propios medios hasta el hospital.    Resumen  Los virus son tipos de microbios que entran en el organismo de maritza persona y causan enfermedades. Las enfermedades virales pueden ser leves o graves. Pueden afectar diferentes partes del cuerpo.  Los virus pueden ser difíciles de tratar. Hay medicamentos para aliviar los síntomas y hay algunos medicamentos antivirales.  Si le recetaron un medicamento antiviral, tómelo boogie se lo haya indicado el médico. No deje de lilly el antiviral aunque comience a sentirse mejor.  Comuníquese con un médico si tiene síntomas de maritza enfermedad viral que no desaparecen.  Esta información no tiene boogie fin reemplazar el consejo del médico. Asegúrese de hacerle al médico cualquier pregunta que tenga.

## 2024-01-03 NOTE — ED PROVIDER NOTE - PHYSICAL EXAMINATION
Gen: Well appearing in NAD.   ENT: oral mucosa moist   Head: atraumatic  Heart: s1/s2, RRR  Lung: CTA b/l, no wheezing/rhonchi or rales. No tachypnea or hypoxia   Abd: soft, NT/ND, no rebound or guarding, NCVAT  Msk: no pedal edema  Neuro: AAO x3,  Skin: Normal for race. No rashes  Psych: Alert and oriented

## 2024-01-03 NOTE — ED PROVIDER NOTE - OBJECTIVE STATEMENT
#[id number]  54 y/o F presents to the ED with complaints of diarrhea, cough and body aches x 2 days.  Patient denies any bloody diarrhea, abdominal pain, sick contacts, recent travel, n/v, chest pain or shortness of breath.  #[id number]  54 y/o F presents to the ED with complaints of diarrhea, cough and body aches x 2 days.  Patient denies any bloody diarrhea, abdominal pain, sick contacts, recent travel, n/v, chest pain or shortness of breath.

## 2024-01-03 NOTE — ED PROVIDER NOTE - PATIENT PORTAL LINK FT
You can access the FollowMyHealth Patient Portal offered by [facility] by registering at the following website: [email or web address]. By joining [facility]’s FollowMyHealth portal, you will also be able to view your health information using other applications (apps) compatible with our system. You can access the FollowMyHealth Patient Portal offered by [facility] by registering at the following website: [email or web address]. By joining [facility]’s FollowMyHealth portal, you will also be able to view your health information using other applications (apps) compatible with our system.

## 2024-01-03 NOTE — ED ADULT NURSE NOTE - NSFALLUNIVINTERV_ED_ALL_ED
Bed/Stretcher in lowest position, wheels locked, appropriate side rails in place/Call bell, personal items and telephone in reach/Instruct patient to call for assistance before getting out of bed/chair/stretcher/Non-slip footwear applied when patient is off stretcher/[place] to call system/Physically safe environment - no spills, clutter or unnecessary equipment/Purposeful proactive rounding/Room/bathroom lighting operational, light cord in reach Bed/Stretcher in lowest position, wheels locked, appropriate side rails in place/Call bell, personal items and telephone in reach/Instruct patient to call for assistance before getting out of bed/chair/stretcher/Non-slip footwear applied when patient is off stretcher/[place] to call system/Physically safe environment - no spills, clutter or unnecessary equipment/Purposeful proactive rounding/Room/bathroom lighting operational, light cord in reach

## 2024-02-20 ENCOUNTER — APPOINTMENT (OUTPATIENT)
Dept: CARDIOLOGY | Facility: CLINIC | Age: 54
End: 2024-02-20
Payer: COMMERCIAL

## 2024-02-20 VITALS
SYSTOLIC BLOOD PRESSURE: 114 MMHG | HEIGHT: 64 IN | BODY MASS INDEX: 41.15 KG/M2 | OXYGEN SATURATION: 96 % | WEIGHT: 241 LBS | DIASTOLIC BLOOD PRESSURE: 74 MMHG | HEART RATE: 99 BPM

## 2024-02-20 PROCEDURE — 99204 OFFICE O/P NEW MOD 45 MIN: CPT | Mod: 25

## 2024-02-20 PROCEDURE — 93000 ELECTROCARDIOGRAM COMPLETE: CPT

## 2024-02-22 ENCOUNTER — LAB SERVICES (OUTPATIENT)
Dept: LAB | Age: 54
End: 2024-02-22

## 2024-02-22 DIAGNOSIS — Z00.00 ANNUAL PHYSICAL EXAM: ICD-10-CM

## 2024-02-22 DIAGNOSIS — R73.03 PREDIABETES: ICD-10-CM

## 2024-02-22 LAB
ALBUMIN SERPL-MCNC: 4 G/DL (ref 3.6–5.1)
ALBUMIN/GLOB SERPL: 1.5 {RATIO} (ref 1–2.4)
ALP SERPL-CCNC: 137 UNITS/L (ref 45–117)
ALT SERPL-CCNC: 36 UNITS/L
ANION GAP SERPL CALC-SCNC: 8 MMOL/L (ref 7–19)
AST SERPL-CCNC: 23 UNITS/L
BASOPHILS # BLD: 0 K/MCL (ref 0–0.3)
BASOPHILS NFR BLD: 0 %
BILIRUB SERPL-MCNC: 0.3 MG/DL (ref 0.2–1)
BUN SERPL-MCNC: 16 MG/DL (ref 6–20)
BUN/CREAT SERPL: 19 (ref 7–25)
CALCIUM SERPL-MCNC: 9.6 MG/DL (ref 8.4–10.2)
CHLORIDE SERPL-SCNC: 109 MMOL/L (ref 97–110)
CHOLEST SERPL-MCNC: 233 MG/DL
CHOLEST/HDLC SERPL: 5.4 {RATIO}
CO2 SERPL-SCNC: 29 MMOL/L (ref 21–32)
CREAT SERPL-MCNC: 0.84 MG/DL (ref 0.51–0.95)
DEPRECATED RDW RBC: 40.2 FL (ref 39–50)
EGFRCR SERPLBLD CKD-EPI 2021: 83 ML/MIN/{1.73_M2}
EOSINOPHIL # BLD: 0.1 K/MCL (ref 0–0.5)
EOSINOPHIL NFR BLD: 3 %
ERYTHROCYTE [DISTWIDTH] IN BLOOD: 12.6 % (ref 11–15)
FASTING DURATION TIME PATIENT: 12 HOURS (ref 0–999)
GLOBULIN SER-MCNC: 2.7 G/DL (ref 2–4)
GLUCOSE SERPL-MCNC: 88 MG/DL (ref 70–99)
HBA1C MFR BLD: 5.1 % (ref 4.5–5.6)
HCT VFR BLD CALC: 38.5 % (ref 36–46.5)
HDLC SERPL-MCNC: 43 MG/DL
HGB BLD-MCNC: 13.4 G/DL (ref 12–15.5)
IMM GRANULOCYTES # BLD AUTO: 0 K/MCL (ref 0–0.2)
IMM GRANULOCYTES # BLD: 0 %
LDLC SERPL CALC-MCNC: 156 MG/DL
LYMPHOCYTES # BLD: 1.7 K/MCL (ref 1–4)
LYMPHOCYTES NFR BLD: 37 %
MCH RBC QN AUTO: 30.6 PG (ref 26–34)
MCHC RBC AUTO-ENTMCNC: 34.8 G/DL (ref 32–36.5)
MCV RBC AUTO: 87.9 FL (ref 78–100)
MONOCYTES # BLD: 0.4 K/MCL (ref 0.3–0.9)
MONOCYTES NFR BLD: 9 %
NEUTROPHILS # BLD: 2.3 K/MCL (ref 1.8–7.7)
NEUTROPHILS NFR BLD: 51 %
NONHDLC SERPL-MCNC: 190 MG/DL
NRBC BLD MANUAL-RTO: 0 /100 WBC
PLATELET # BLD AUTO: 242 K/MCL (ref 140–450)
POTASSIUM SERPL-SCNC: 4.5 MMOL/L (ref 3.4–5.1)
PROT SERPL-MCNC: 6.7 G/DL (ref 6.4–8.2)
RBC # BLD: 4.38 MIL/MCL (ref 4–5.2)
SODIUM SERPL-SCNC: 141 MMOL/L (ref 135–145)
TRIGL SERPL-MCNC: 169 MG/DL
WBC # BLD: 4.6 K/MCL (ref 4.2–11)

## 2024-02-22 PROCEDURE — 83036 HEMOGLOBIN GLYCOSYLATED A1C: CPT | Performed by: CLINICAL MEDICAL LABORATORY

## 2024-02-22 PROCEDURE — 80061 LIPID PANEL: CPT | Performed by: CLINICAL MEDICAL LABORATORY

## 2024-02-22 PROCEDURE — 80053 COMPREHEN METABOLIC PANEL: CPT | Performed by: CLINICAL MEDICAL LABORATORY

## 2024-02-22 PROCEDURE — 36415 COLL VENOUS BLD VENIPUNCTURE: CPT | Performed by: INTERNAL MEDICINE

## 2024-02-22 PROCEDURE — 85025 COMPLETE CBC W/AUTO DIFF WBC: CPT | Performed by: CLINICAL MEDICAL LABORATORY

## 2024-02-23 RX ORDER — ROSUVASTATIN CALCIUM 10 MG/1
10 TABLET, COATED ORAL DAILY
Qty: 90 TABLET | Refills: 1 | Status: SHIPPED | OUTPATIENT
Start: 2024-02-23

## 2024-02-24 ENCOUNTER — LABORATORY RESULT (OUTPATIENT)
Age: 54
End: 2024-02-24

## 2024-02-27 NOTE — ASSESSMENT
[FreeTextEntry1] : Hypertension  She has had reportedly elevated BP despite amlodipine 10mg, clonidine 0.1mg BID, losartan 100mg  lasix 20mg daily lab work with PCP pending  will order echo Instructed her to keep BP log   Will f/u in 1 month

## 2024-02-27 NOTE — HISTORY OF PRESENT ILLNESS
[FreeTextEntry1] : Ms. Pappas is a 54 yo female with a hx hypertension, DM II presenting to [facility] care.  Referred by PCP Dr. Katlyn Walker at [facility] [place].   Pt reports feeling well without chest pain, SOB, LE edema, palpitations, syncope.  She has had difficult to control blood pressure.     Prior tobacco use - 5-6 years ago  Denies alcohol or drug use.   Family hx - no cardiac disease.   No formal exercise. Limited by knee pain.

## 2024-02-27 NOTE — REASON FOR VISIT
[FreeTextEntry3] : Dr. Katlyn Walker  Patient requests all Lab and Radiology Results on their Discharge Instructions

## 2024-02-29 ENCOUNTER — APPOINTMENT (OUTPATIENT)
Dept: CARDIOLOGY | Facility: CLINIC | Age: 54
End: 2024-02-29

## 2024-02-29 ENCOUNTER — E-ADVICE (OUTPATIENT)
Dept: FAMILY MEDICINE | Age: 54
End: 2024-02-29

## 2024-02-29 ENCOUNTER — TELEPHONE (OUTPATIENT)
Dept: FAMILY MEDICINE | Age: 54
End: 2024-02-29

## 2024-03-07 ENCOUNTER — OUTPATIENT (OUTPATIENT)
Dept: OUTPATIENT SERVICES | Facility: HOSPITAL | Age: 54
LOS: 1 days | Discharge: HOME | End: 2024-03-07

## 2024-03-07 DIAGNOSIS — Z98.890 OTHER SPECIFIED POSTPROCEDURAL STATES: Chronic | ICD-10-CM

## 2024-03-07 DIAGNOSIS — Z98.51 TUBAL LIGATION STATUS: Chronic | ICD-10-CM

## 2024-03-07 DIAGNOSIS — C50.919 MALIGNANT NEOPLASM OF UNSPECIFIED SITE OF UNSPECIFIED FEMALE BREAST: ICD-10-CM

## 2024-03-12 LAB
ALBUMIN SERPL ELPH-MCNC: 4 G/DL
ALP BLD-CCNC: 85 U/L
ALT SERPL-CCNC: 15 U/L
ANION GAP SERPL CALC-SCNC: 14 MMOL/L
AST SERPL-CCNC: 15 U/L
BASOPHILS # BLD AUTO: 0 K/UL
BASOPHILS NFR BLD AUTO: 0 %
BILIRUB SERPL-MCNC: 0.4 MG/DL
BUN SERPL-MCNC: 15 MG/DL
CALCIUM SERPL-MCNC: 8.8 MG/DL
CHLORIDE SERPL-SCNC: 102 MMOL/L
CHOLEST SERPL-MCNC: 176 MG/DL
CO2 SERPL-SCNC: 25 MMOL/L
CREAT SERPL-MCNC: 0.56 MG/DL
EGFR: 109 ML/MIN/1.73M2
EOSINOPHIL # BLD AUTO: 0.88 K/UL
EOSINOPHIL NFR BLD AUTO: 5 %
ESTIMATED AVERAGE GLUCOSE: 209 MG/DL
GLUCOSE SERPL-MCNC: 131 MG/DL
HBA1C MFR BLD HPLC: 8.9 %
HCT VFR BLD CALC: 46.9 %
HDLC SERPL-MCNC: 49 MG/DL
HGB BLD-MCNC: 15 G/DL
LDLC SERPL CALC-MCNC: 98 MG/DL
LYMPHOCYTES # BLD AUTO: 4.42 K/UL
LYMPHOCYTES NFR BLD AUTO: 25.2 %
MAN DIFF?: NORMAL
MCHC RBC-ENTMCNC: 29.6 PG
MCHC RBC-ENTMCNC: 32 GM/DL
MCV RBC AUTO: 92.5 FL
MONOCYTES # BLD AUTO: 1.33 K/UL
MONOCYTES NFR BLD AUTO: 7.6 %
NEUTROPHILS # BLD AUTO: 10.92 K/UL
NEUTROPHILS NFR BLD AUTO: 61.4 %
NONHDLC SERPL-MCNC: 127 MG/DL
PLATELET # BLD AUTO: 242 K/UL
POTASSIUM SERPL-SCNC: 4 MMOL/L
PROT SERPL-MCNC: 6.5 G/DL
RBC # BLD: 5.07 M/UL
RBC # FLD: 15 %
SODIUM SERPL-SCNC: 141 MMOL/L
TRIGL SERPL-MCNC: 170 MG/DL
TSH SERPL-ACNC: 5.56 UIU/ML
WBC # FLD AUTO: 17.55 K/UL

## 2024-03-13 LAB — HM MAMMOGRAPHY BILATERAL: NORMAL

## 2024-03-15 ENCOUNTER — RESULT REVIEW (OUTPATIENT)
Age: 54
End: 2024-03-15

## 2024-03-15 ENCOUNTER — APPOINTMENT (OUTPATIENT)
Dept: HEMATOLOGY ONCOLOGY | Facility: CLINIC | Age: 54
End: 2024-03-15
Payer: COMMERCIAL

## 2024-03-15 VITALS
OXYGEN SATURATION: 94 % | DIASTOLIC BLOOD PRESSURE: 84 MMHG | TEMPERATURE: 98 F | BODY MASS INDEX: 41.25 KG/M2 | HEART RATE: 96 BPM | SYSTOLIC BLOOD PRESSURE: 148 MMHG | WEIGHT: 240.3 LBS | RESPIRATION RATE: 16 BRPM

## 2024-03-15 DIAGNOSIS — N92.6 IRREGULAR MENSTRUATION, UNSPECIFIED: ICD-10-CM

## 2024-03-15 DIAGNOSIS — T45.1X5A FLUSHING: ICD-10-CM

## 2024-03-15 DIAGNOSIS — E66.9 OBESITY, UNSPECIFIED: ICD-10-CM

## 2024-03-15 DIAGNOSIS — C50.911 MALIGNANT NEOPLASM OF UNSPECIFIED SITE OF RIGHT FEMALE BREAST: ICD-10-CM

## 2024-03-15 DIAGNOSIS — Z79.810 LONG TERM (CURRENT) USE OF SELECTIVE ESTROGEN RECEPTOR MODULATORS (SERMS): ICD-10-CM

## 2024-03-15 DIAGNOSIS — R23.2 FLUSHING: ICD-10-CM

## 2024-03-15 LAB
BASOPHILS # BLD AUTO: 0.08 K/UL — SIGNIFICANT CHANGE UP (ref 0–0.2)
BASOPHILS NFR BLD AUTO: 0.5 % — SIGNIFICANT CHANGE UP (ref 0–2)
EOSINOPHIL # BLD AUTO: 0.19 K/UL — SIGNIFICANT CHANGE UP (ref 0–0.5)
EOSINOPHIL NFR BLD AUTO: 1.2 % — SIGNIFICANT CHANGE UP (ref 0–6)
HCT VFR BLD CALC: 47.7 % — HIGH (ref 34.5–45)
HGB BLD-MCNC: 15.8 G/DL — HIGH (ref 11.5–15.5)
IMM GRANULOCYTES NFR BLD AUTO: 2.1 % — HIGH (ref 0–0.9)
LYMPHOCYTES # BLD AUTO: 20.6 % — SIGNIFICANT CHANGE UP (ref 13–44)
LYMPHOCYTES # BLD AUTO: 3.26 K/UL — SIGNIFICANT CHANGE UP (ref 1–3.3)
MCHC RBC-ENTMCNC: 30.9 PG — SIGNIFICANT CHANGE UP (ref 27–34)
MCHC RBC-ENTMCNC: 33.1 G/DL — SIGNIFICANT CHANGE UP (ref 32–36)
MCV RBC AUTO: 93.2 FL — SIGNIFICANT CHANGE UP (ref 80–100)
MONOCYTES # BLD AUTO: 1.02 K/UL — HIGH (ref 0–0.9)
MONOCYTES NFR BLD AUTO: 6.5 % — SIGNIFICANT CHANGE UP (ref 2–14)
NEUTROPHILS # BLD AUTO: 10.92 K/UL — HIGH (ref 1.8–7.4)
NEUTROPHILS NFR BLD AUTO: 69.1 % — SIGNIFICANT CHANGE UP (ref 43–77)
NRBC # BLD: 0 /100 WBCS — SIGNIFICANT CHANGE UP (ref 0–0)
PLATELET # BLD AUTO: 268 K/UL — SIGNIFICANT CHANGE UP (ref 150–400)
RBC # BLD: 5.12 M/UL — SIGNIFICANT CHANGE UP (ref 3.8–5.2)
RBC # FLD: 14.5 % — SIGNIFICANT CHANGE UP (ref 10.3–14.5)
WBC # BLD: 15.8 K/UL — HIGH (ref 3.8–10.5)
WBC # FLD AUTO: 15.8 K/UL — HIGH (ref 3.8–10.5)

## 2024-03-15 PROCEDURE — 99214 OFFICE O/P EST MOD 30 MIN: CPT

## 2024-03-15 PROCEDURE — G2211 COMPLEX E/M VISIT ADD ON: CPT

## 2024-03-18 ENCOUNTER — APPOINTMENT (OUTPATIENT)
Dept: CARDIOLOGY | Facility: CLINIC | Age: 54
End: 2024-03-18
Payer: COMMERCIAL

## 2024-03-18 VITALS
OXYGEN SATURATION: 95 % | WEIGHT: 243 LBS | SYSTOLIC BLOOD PRESSURE: 140 MMHG | HEART RATE: 105 BPM | BODY MASS INDEX: 41.48 KG/M2 | DIASTOLIC BLOOD PRESSURE: 86 MMHG | HEIGHT: 64 IN

## 2024-03-18 PROBLEM — E66.9 OBESITY: Status: ACTIVE | Noted: 2022-09-12

## 2024-03-18 PROBLEM — N92.6 IRREGULAR MENSES: Status: ACTIVE | Noted: 2019-11-04

## 2024-03-18 PROBLEM — R23.2 HOT FLASHES DUE TO TAMOXIFEN: Status: ACTIVE | Noted: 2019-11-04

## 2024-03-18 PROBLEM — Z79.810 CARE RELATED TO CURRENT TAMOXIFEN USE: Status: ACTIVE | Noted: 2019-11-04

## 2024-03-18 PROCEDURE — G2211 COMPLEX E/M VISIT ADD ON: CPT

## 2024-03-18 PROCEDURE — 99214 OFFICE O/P EST MOD 30 MIN: CPT

## 2024-03-18 RX ORDER — ASPIRIN ENTERIC COATED TABLETS 81 MG 81 MG/1
81 TABLET, DELAYED RELEASE ORAL
Qty: 90 | Refills: 1 | Status: ACTIVE | COMMUNITY
Start: 2023-03-03 | End: 1900-01-01

## 2024-03-18 RX ORDER — TAMOXIFEN CITRATE 20 MG/1
20 TABLET, FILM COATED ORAL
Qty: 90 | Refills: 1 | Status: ACTIVE | COMMUNITY
Start: 2019-01-01 | End: 1900-01-01

## 2024-03-18 NOTE — HISTORY OF PRESENT ILLNESS
[Disease: _____________________] : Disease: [unfilled] [T: ___] : T[unfilled] [N: ___] : N[unfilled] [M: ___] : M[unfilled] [AJCC Stage: ____] : AJCC Stage: [unfilled] [de-identified] : Pt is a 46 yo female w/ pmhx HTN and DMII who initially presented with abnormal right mammogram and breast mass in 3/2018. US guided right breast core biopsy in 4/2018 showed infiltrating ductal carcinoma of 1 cm mass. She was seen by surgical oncology. She underwent right lumpectomy and Right sentinal node biopsy in 7/2018 which revealed 1.2 cm  moderately differentiated ductal carcinoma with Bascom score 6/9. ER 80-90% positive, WY 20-25% positive. Her-2 was equivocal with negative FISH. 1.2 cm mass. 3 sentinel nodes were negative. Stage 1A- [id number].  OncotypeDx is 16  RT delayed 2/2 insurance issues  Completed Radiation therapy 12/11/18 tamoxifen started 1/2019 [de-identified] : moderately differentiated ductal carcinoma  [de-identified] : - genetic testing - 17 gene testing all came back negative. No further testing needed. [de-identified] : ER 80-90%, NV 20-25%, HER2 negative  [de-identified] : Ms. GIUSEPPE WYNN is here for a follow up visit today for stage I right breast ca dx in 2018, s/p RT and on Tamoxifen since 2019. Tx care from clinic 2019  She is tolerating tamoxifen well with tolerable hot flashes. Good compliance. She denies mood changes, vaginal dryness, SOB, chest pain, leg swelling or clotting issues. Her energy and appetite is good.  Reports occassional hoy flashes but tolerable. wt stable. Life style changes to loose d/w her at every visit  LMP date 2018,  FSH/E premenopausal range 3/2024  GYN - REMINDED TO SEE ANNUALLY Breast imagin2021, birads 2, 2022 BIRADS 2  7/15/2023 BIRADS 2.  Opthal: SEEING ANNUALLY  She has HTN, controlled, she does take baby ASA [FreeTextEntry1] : tamoxifen started 1/2019

## 2024-03-18 NOTE — ASSESSMENT
[Curative] : Goals of care discussed with patient: Curative [FreeTextEntry1] : Ms. GIUSEPPE WYNN is a 49 yo female with stage 1A ([id number]) invasive ductal carcinoma breast ER/NV +, HER2 neg s/p right lumpectomy and adjuvant radiation.  oncotype 16. Started on tamoxifen January 2019.    - Breast ca: She is tolerating tamoxifen very well without significant side effects. Reports good compliance. Plan to continue tamoxifen for 10 years. Will switch to AI after menopause. FSH low  3/2024,  GYN and opthal f/u annually due to risk of endometrial ca and cataracts respectively.  Up to date with Breast imaging. - BONE PAIN: . Bone scan JOSE 9/2022. pain resolved now -  Mild hot flashes: 2/2 tamoxifen.improved  Advised to wear layers and use fan prn. Consider Effexor if get worse. - Wt gain:Life style modifications, healthy diet and exercise d/w her.   - Irregular periods: 2/2 perimenopause. Annual GYN f/u.  reminded to schedule this year.She will continue to use non-hormonal contraception. Recheck FSH/estradiol prn if in menopause will switch to AI - Patient is aware of signs and symptoms of DVT/PE. Patient will report if she develops acute onset chest pain shortness of breath, leg swelling or calf pain.  -Low serum Vit D -  continue vit D suppls daily - She has HTN, well controlled. Continue ASA for CVA/VTE ppx.   RTC 6 m.

## 2024-03-18 NOTE — REASON FOR VISIT
[Follow-Up Visit] : a follow-up [Other: _____] : [unfilled] [[employer] ] : provided by [employer]   [FreeTextEntry2] : Stage 1A breast Cancer  [FreeTextEntry3] : [facility] , [id number]

## 2024-03-18 NOTE — PHYSICAL EXAM
[Fully active, able to carry on all pre-disease performance without restriction] : Status 0 - Fully active, able to carry on all pre-disease performance without restriction [Obese] : obese [Normal] : affect appropriate [de-identified] : RIGHT BREAST lumpectomy and axillary sca  and  post RT skin changes noted

## 2024-03-19 ENCOUNTER — OFF PREMISE (OUTPATIENT)
Dept: FAMILY MEDICINE | Age: 54
End: 2024-03-19

## 2024-03-19 LAB
ALBUMIN SERPL ELPH-MCNC: 4.1 G/DL
ALP BLD-CCNC: 85 U/L
ALT SERPL-CCNC: 11 U/L
ANION GAP SERPL CALC-SCNC: 14 MMOL/L
AST SERPL-CCNC: 14 U/L
BILIRUB SERPL-MCNC: 0.3 MG/DL
BUN SERPL-MCNC: 16 MG/DL
CALCIUM SERPL-MCNC: 9.6 MG/DL
CHLORIDE SERPL-SCNC: 102 MMOL/L
CO2 SERPL-SCNC: 27 MMOL/L
CREAT SERPL-MCNC: 0.61 MG/DL
EGFR: 107 ML/MIN/1.73M2
ESTRADIOL SERPL-MCNC: 7 PG/ML
FSH SERPL-MCNC: 9.6 IU/L
GLUCOSE SERPL-MCNC: 162 MG/DL
POTASSIUM SERPL-SCNC: 4.5 MMOL/L
PROT SERPL-MCNC: 6.7 G/DL
SODIUM SERPL-SCNC: 142 MMOL/L

## 2024-03-19 NOTE — ASSESSMENT
[FreeTextEntry1] : Hypertension  She has had reportedly elevated BP despite extensive regimen:  amlodipine 10mg,  clonidine 0.1mg BID,  losartan 100mg  lasix 20mg daily instructed her to check BP at home and keep log. If persistently elevated may need additional agent/secondary cause workup  echo ordered   recommend sleep study     will f/u in 2 months

## 2024-03-19 NOTE — HISTORY OF PRESENT ILLNESS
[FreeTextEntry1] : Ms. Pappas is a 54 yo female with a hx hypertension, DM II presenting for routine follow up.   Referred initially by PCP Dr. Katlyn Walker at [facility].   Pt reports feeling well without chest pain, SOB, LE edema, palpitations, syncope.  She has had difficult to control blood pressure.   She has not been checking BP at home. Did not have echocardiogram due to lapse in insurance.     Prior tobacco use - 5-6 years ago  Denies alcohol or drug use.   Family hx - no cardiac disease.   No formal exercise. Limited by knee pain.

## 2024-04-11 ENCOUNTER — APPOINTMENT (OUTPATIENT)
Dept: FAMILY MEDICINE | Age: 54
End: 2024-04-11

## 2024-04-26 ENCOUNTER — RESULT REVIEW (OUTPATIENT)
Age: 54
End: 2024-04-26

## 2024-05-20 ENCOUNTER — APPOINTMENT (OUTPATIENT)
Dept: CARDIOLOGY | Facility: CLINIC | Age: 54
End: 2024-05-20

## 2024-06-12 ENCOUNTER — EXTERNAL RECORD (OUTPATIENT)
Dept: OTHER | Age: 54
End: 2024-06-12

## 2024-06-12 ENCOUNTER — APPOINTMENT (OUTPATIENT)
Dept: FAMILY MEDICINE | Age: 54
End: 2024-06-12

## 2024-06-12 VITALS
WEIGHT: 130.8 LBS | OXYGEN SATURATION: 99 % | SYSTOLIC BLOOD PRESSURE: 106 MMHG | HEART RATE: 96 BPM | BODY MASS INDEX: 26.42 KG/M2 | TEMPERATURE: 98.5 F | DIASTOLIC BLOOD PRESSURE: 72 MMHG

## 2024-06-12 DIAGNOSIS — Z86.39 HISTORY OF OBESITY: ICD-10-CM

## 2024-06-12 DIAGNOSIS — K76.0 METABOLIC DYSFUNCTION-ASSOCIATED STEATOTIC LIVER DISEASE (MASLD): Primary | ICD-10-CM

## 2024-06-12 DIAGNOSIS — Z78.0 POSTMENOPAUSAL: ICD-10-CM

## 2024-06-12 PROCEDURE — 99214 OFFICE O/P EST MOD 30 MIN: CPT | Performed by: NURSE PRACTITIONER

## 2024-06-12 RX ORDER — METFORMIN HYDROCHLORIDE 500 MG/1
500 TABLET, EXTENDED RELEASE ORAL
Qty: 30 TABLET | Refills: 1 | Status: CANCELLED | OUTPATIENT
Start: 2024-06-12

## 2024-06-12 RX ORDER — ESTRADIOL 0.03 MG/D
1 FILM, EXTENDED RELEASE TRANSDERMAL
Qty: 8 PATCH | Refills: 2 | Status: SHIPPED | OUTPATIENT
Start: 2024-06-13

## 2024-06-17 ENCOUNTER — OFFICE (OUTPATIENT)
Dept: URBAN - METROPOLITAN AREA CLINIC 102 | Facility: CLINIC | Age: 54
Setting detail: OPHTHALMOLOGY
End: 2024-06-17
Payer: COMMERCIAL

## 2024-06-17 DIAGNOSIS — H25.13: ICD-10-CM

## 2024-06-17 DIAGNOSIS — H52.4: ICD-10-CM

## 2024-06-17 DIAGNOSIS — E11.9: ICD-10-CM

## 2024-06-17 DIAGNOSIS — H16.223: ICD-10-CM

## 2024-06-17 DIAGNOSIS — H43.393: ICD-10-CM

## 2024-06-17 PROCEDURE — 92015 DETERMINE REFRACTIVE STATE: CPT | Performed by: OPHTHALMOLOGY

## 2024-06-17 PROCEDURE — 92020 GONIOSCOPY: CPT | Performed by: OPHTHALMOLOGY

## 2024-06-17 PROCEDURE — 92014 COMPRE OPH EXAM EST PT 1/>: CPT | Performed by: OPHTHALMOLOGY

## 2024-06-17 ASSESSMENT — CONFRONTATIONAL VISUAL FIELD TEST (CVF)
OS_FINDINGS: FULL
OD_FINDINGS: FULL

## 2024-07-06 ENCOUNTER — EMERGENCY (EMERGENCY)
Facility: HOSPITAL | Age: 54
LOS: 0 days | Discharge: HOME | End: 2024-07-06
Attending: EMERGENCY MEDICINE
Payer: COMMERCIAL

## 2024-07-06 VITALS — HEIGHT: 65 IN | WEIGHT: 253.09 LBS

## 2024-07-06 VITALS
OXYGEN SATURATION: 93 % | SYSTOLIC BLOOD PRESSURE: 143 MMHG | HEART RATE: 88 BPM | TEMPERATURE: 98 F | DIASTOLIC BLOOD PRESSURE: 78 MMHG | RESPIRATION RATE: 17 BRPM

## 2024-07-06 DIAGNOSIS — I10 ESSENTIAL (PRIMARY) HYPERTENSION: ICD-10-CM

## 2024-07-06 DIAGNOSIS — Z98.51 TUBAL LIGATION STATUS: Chronic | ICD-10-CM

## 2024-07-06 DIAGNOSIS — R42 DIZZINESS AND GIDDINESS: ICD-10-CM

## 2024-07-06 DIAGNOSIS — E11.9 TYPE 2 DIABETES MELLITUS WITHOUT COMPLICATIONS: ICD-10-CM

## 2024-07-06 DIAGNOSIS — Z88.8 ALLERGY STATUS TO OTHER DRUGS, MEDICAMENTS AND BIOLOGICAL SUBSTANCES: ICD-10-CM

## 2024-07-06 DIAGNOSIS — R51.9 HEADACHE, UNSPECIFIED: ICD-10-CM

## 2024-07-06 DIAGNOSIS — Z98.890 OTHER SPECIFIED POSTPROCEDURAL STATES: Chronic | ICD-10-CM

## 2024-07-06 DIAGNOSIS — R00.0 TACHYCARDIA, UNSPECIFIED: ICD-10-CM

## 2024-07-06 DIAGNOSIS — H93.11 TINNITUS, RIGHT EAR: ICD-10-CM

## 2024-07-06 LAB
ALBUMIN SERPL ELPH-MCNC: 3 G/DL — LOW (ref 3.3–5)
ALP SERPL-CCNC: 68 U/L — SIGNIFICANT CHANGE UP (ref 40–120)
ALT FLD-CCNC: 23 U/L — SIGNIFICANT CHANGE UP (ref 12–78)
ANION GAP SERPL CALC-SCNC: 11 MMOL/L — SIGNIFICANT CHANGE UP (ref 5–17)
AST SERPL-CCNC: 15 U/L — SIGNIFICANT CHANGE UP (ref 15–37)
BASOPHILS # BLD AUTO: 0.1 K/UL — SIGNIFICANT CHANGE UP (ref 0–0.2)
BASOPHILS NFR BLD AUTO: 0.7 % — SIGNIFICANT CHANGE UP (ref 0–2)
BILIRUB SERPL-MCNC: 0.5 MG/DL — SIGNIFICANT CHANGE UP (ref 0.2–1.2)
BUN SERPL-MCNC: 14 MG/DL — SIGNIFICANT CHANGE UP (ref 7–23)
CALCIUM SERPL-MCNC: 8.4 MG/DL — LOW (ref 8.5–10.1)
CHLORIDE SERPL-SCNC: 107 MMOL/L — SIGNIFICANT CHANGE UP (ref 96–108)
CO2 SERPL-SCNC: 26 MMOL/L — SIGNIFICANT CHANGE UP (ref 22–31)
CREAT SERPL-MCNC: 0.6 MG/DL — SIGNIFICANT CHANGE UP (ref 0.5–1.3)
EGFR: 107 ML/MIN/1.73M2 — SIGNIFICANT CHANGE UP
EGFR: 107 ML/MIN/1.73M2 — SIGNIFICANT CHANGE UP
EOSINOPHIL # BLD AUTO: 0.27 K/UL — SIGNIFICANT CHANGE UP (ref 0–0.5)
EOSINOPHIL NFR BLD AUTO: 1.9 % — SIGNIFICANT CHANGE UP (ref 0–6)
GLUCOSE SERPL-MCNC: 110 MG/DL — HIGH (ref 70–99)
HCT VFR BLD CALC: 51.2 % — HIGH (ref 34.5–45)
HGB BLD-MCNC: 16.8 G/DL — HIGH (ref 11.5–15.5)
IMM GRANULOCYTES NFR BLD AUTO: 1.9 % — HIGH (ref 0–0.9)
LYMPHOCYTES # BLD AUTO: 2.92 K/UL — SIGNIFICANT CHANGE UP (ref 1–3.3)
LYMPHOCYTES # BLD AUTO: 20.2 % — SIGNIFICANT CHANGE UP (ref 13–44)
MCHC RBC-ENTMCNC: 30.9 PG — SIGNIFICANT CHANGE UP (ref 27–34)
MCHC RBC-ENTMCNC: 32.8 GM/DL — SIGNIFICANT CHANGE UP (ref 32–36)
MCV RBC AUTO: 94.3 FL — SIGNIFICANT CHANGE UP (ref 80–100)
MONOCYTES # BLD AUTO: 1.03 K/UL — HIGH (ref 0–0.9)
MONOCYTES NFR BLD AUTO: 7.1 % — SIGNIFICANT CHANGE UP (ref 2–14)
NEUTROPHILS # BLD AUTO: 9.85 K/UL — HIGH (ref 1.8–7.4)
NEUTROPHILS NFR BLD AUTO: 68.2 % — SIGNIFICANT CHANGE UP (ref 43–77)
PLATELET # BLD AUTO: 262 K/UL — SIGNIFICANT CHANGE UP (ref 150–400)
POTASSIUM SERPL-MCNC: 3.5 MMOL/L — SIGNIFICANT CHANGE UP (ref 3.5–5.3)
POTASSIUM SERPL-SCNC: 3.5 MMOL/L — SIGNIFICANT CHANGE UP (ref 3.5–5.3)
PROT SERPL-MCNC: 6.2 GM/DL — SIGNIFICANT CHANGE UP (ref 6–8.3)
RBC # BLD: 5.43 M/UL — HIGH (ref 3.8–5.2)
RBC # FLD: 15.6 % — HIGH (ref 10.3–14.5)
SODIUM SERPL-SCNC: 144 MMOL/L — SIGNIFICANT CHANGE UP (ref 135–145)
TROPONIN I, HIGH SENSITIVITY RESULT: 15.72 NG/L — SIGNIFICANT CHANGE UP
WBC # BLD: 14.45 K/UL — HIGH (ref 3.8–10.5)
WBC # FLD AUTO: 14.45 K/UL — HIGH (ref 3.8–10.5)

## 2024-07-06 PROCEDURE — 36415 COLL VENOUS BLD VENIPUNCTURE: CPT

## 2024-07-06 PROCEDURE — 71045 X-RAY EXAM CHEST 1 VIEW: CPT | Mod: 26

## 2024-07-06 PROCEDURE — 80053 COMPREHEN METABOLIC PANEL: CPT

## 2024-07-06 PROCEDURE — 93010 ELECTROCARDIOGRAM REPORT: CPT

## 2024-07-06 PROCEDURE — 70498 CT ANGIOGRAPHY NECK: CPT | Mod: 26,MC

## 2024-07-06 PROCEDURE — 71045 X-RAY EXAM CHEST 1 VIEW: CPT

## 2024-07-06 PROCEDURE — 99285 EMERGENCY DEPT VISIT HI MDM: CPT

## 2024-07-06 PROCEDURE — 70496 CT ANGIOGRAPHY HEAD: CPT | Mod: 26,MC

## 2024-07-06 PROCEDURE — 36000 PLACE NEEDLE IN VEIN: CPT | Mod: XU

## 2024-07-06 PROCEDURE — 84484 ASSAY OF TROPONIN QUANT: CPT

## 2024-07-06 PROCEDURE — 70496 CT ANGIOGRAPHY HEAD: CPT | Mod: MC

## 2024-07-06 PROCEDURE — 93005 ELECTROCARDIOGRAM TRACING: CPT

## 2024-07-06 PROCEDURE — 99285 EMERGENCY DEPT VISIT HI MDM: CPT | Mod: 25

## 2024-07-06 PROCEDURE — 85025 COMPLETE CBC W/AUTO DIFF WBC: CPT

## 2024-07-06 PROCEDURE — 70498 CT ANGIOGRAPHY NECK: CPT | Mod: MC

## 2024-07-06 RX ORDER — MECLIZINE HCL 12.5 MG
25 TABLET ORAL ONCE
Refills: 0 | Status: COMPLETED | OUTPATIENT
Start: 2024-07-06 | End: 2024-07-06

## 2024-07-06 RX ORDER — FLUTICASONE PROPIONATE 50 UG/1
1 SPRAY, METERED NASAL
Qty: 1 | Refills: 0 | DISCHARGE
Start: 2024-07-06

## 2024-07-06 RX ORDER — FLUTICASONE PROPIONATE 50 UG/1
1 SPRAY, METERED NASAL
Qty: 1 | Refills: 0
Start: 2024-07-06

## 2024-07-06 RX ORDER — MECLIZINE HCL 12.5 MG
1 TABLET ORAL
Qty: 15 | Refills: 0
Start: 2024-07-06

## 2024-07-06 RX ADMIN — Medication 25 MILLIGRAM(S): at 15:31

## 2024-07-06 RX ADMIN — Medication 1000 MILLILITER(S): at 15:31

## 2024-07-15 ENCOUNTER — INPATIENT (INPATIENT)
Facility: HOSPITAL | Age: 54
LOS: 4 days | Discharge: HOME HEALTH | DRG: 137 | End: 2024-07-20
Attending: FAMILY MEDICINE | Admitting: INTERNAL MEDICINE
Payer: COMMERCIAL

## 2024-07-15 VITALS — WEIGHT: 247.8 LBS

## 2024-07-15 DIAGNOSIS — Z98.890 OTHER SPECIFIED POSTPROCEDURAL STATES: Chronic | ICD-10-CM

## 2024-07-15 DIAGNOSIS — U07.1 COVID-19: ICD-10-CM

## 2024-07-15 DIAGNOSIS — Z98.51 TUBAL LIGATION STATUS: Chronic | ICD-10-CM

## 2024-07-15 LAB
ALBUMIN SERPL ELPH-MCNC: 3.2 G/DL — LOW (ref 3.3–5)
ALBUMIN SERPL ELPH-MCNC: 3.3 G/DL — SIGNIFICANT CHANGE UP (ref 3.3–5)
ALP SERPL-CCNC: 78 U/L — SIGNIFICANT CHANGE UP (ref 40–120)
ALP SERPL-CCNC: 82 U/L — SIGNIFICANT CHANGE UP (ref 40–120)
ALT FLD-CCNC: 23 U/L — SIGNIFICANT CHANGE UP (ref 12–78)
ALT FLD-CCNC: 24 U/L — SIGNIFICANT CHANGE UP (ref 12–78)
ANION GAP SERPL CALC-SCNC: 5 MMOL/L — SIGNIFICANT CHANGE UP (ref 5–17)
APPEARANCE UR: CLEAR — SIGNIFICANT CHANGE UP
APTT BLD: 26.9 SEC — SIGNIFICANT CHANGE UP (ref 24.5–35.6)
AST SERPL-CCNC: 20 U/L — SIGNIFICANT CHANGE UP (ref 15–37)
AST SERPL-CCNC: 22 U/L — SIGNIFICANT CHANGE UP (ref 15–37)
BASE EXCESS BLDV CALC-SCNC: 1.7 MMOL/L — SIGNIFICANT CHANGE UP (ref -2–3)
BASOPHILS # BLD AUTO: 0 K/UL — SIGNIFICANT CHANGE UP (ref 0–0.2)
BASOPHILS NFR BLD AUTO: 0 % — SIGNIFICANT CHANGE UP (ref 0–2)
BILIRUB DIRECT SERPL-MCNC: 0.1 MG/DL — SIGNIFICANT CHANGE UP (ref 0–0.3)
BILIRUB INDIRECT FLD-MCNC: 0.6 MG/DL — SIGNIFICANT CHANGE UP (ref 0.2–1)
BILIRUB SERPL-MCNC: 0.7 MG/DL — SIGNIFICANT CHANGE UP (ref 0.2–1.2)
BILIRUB SERPL-MCNC: 0.8 MG/DL — SIGNIFICANT CHANGE UP (ref 0.2–1.2)
BILIRUB UR-MCNC: NEGATIVE — SIGNIFICANT CHANGE UP
BUN SERPL-MCNC: 13 MG/DL — SIGNIFICANT CHANGE UP (ref 7–23)
CALCIUM SERPL-MCNC: 9.1 MG/DL — SIGNIFICANT CHANGE UP (ref 8.5–10.1)
CHLORIDE SERPL-SCNC: 103 MMOL/L — SIGNIFICANT CHANGE UP (ref 96–108)
CK SERPL-CCNC: 45 U/L — SIGNIFICANT CHANGE UP (ref 26–192)
CO2 SERPL-SCNC: 28 MMOL/L — SIGNIFICANT CHANGE UP (ref 22–31)
COLOR SPEC: YELLOW — SIGNIFICANT CHANGE UP
CREAT SERPL-MCNC: 0.72 MG/DL — SIGNIFICANT CHANGE UP (ref 0.5–1.3)
CREAT SERPL-MCNC: 0.8 MG/DL — SIGNIFICANT CHANGE UP (ref 0.5–1.3)
DIFF PNL FLD: NEGATIVE — SIGNIFICANT CHANGE UP
EGFR: 100 ML/MIN/1.73M2 — SIGNIFICANT CHANGE UP
EGFR: 88 ML/MIN/1.73M2 — SIGNIFICANT CHANGE UP
EOSINOPHIL # BLD AUTO: 0.13 K/UL — SIGNIFICANT CHANGE UP (ref 0–0.5)
EOSINOPHIL NFR BLD AUTO: 1 % — SIGNIFICANT CHANGE UP (ref 0–6)
FLUAV AG NPH QL: SIGNIFICANT CHANGE UP
FLUBV AG NPH QL: SIGNIFICANT CHANGE UP
GAS PNL BLDV: SIGNIFICANT CHANGE UP
GLUCOSE BLDC GLUCOMTR-MCNC: 223 MG/DL — HIGH (ref 70–99)
GLUCOSE BLDC GLUCOMTR-MCNC: 261 MG/DL — HIGH (ref 70–99)
GLUCOSE SERPL-MCNC: 167 MG/DL — HIGH (ref 70–99)
GLUCOSE UR QL: >=1000 MG/DL
HCO3 BLDV-SCNC: 26 MMOL/L — SIGNIFICANT CHANGE UP (ref 22–29)
HCT VFR BLD CALC: 51.5 % — HIGH (ref 34.5–45)
HGB BLD-MCNC: 16.8 G/DL — HIGH (ref 11.5–15.5)
INR BLD: 0.99 RATIO — SIGNIFICANT CHANGE UP (ref 0.85–1.18)
INR BLD: 1.02 RATIO — SIGNIFICANT CHANGE UP (ref 0.85–1.18)
KETONES UR-MCNC: ABNORMAL MG/DL
LACTATE SERPL-SCNC: 2 MMOL/L — SIGNIFICANT CHANGE UP (ref 0.7–2)
LEUKOCYTE ESTERASE UR-ACNC: NEGATIVE — SIGNIFICANT CHANGE UP
LYMPHOCYTES # BLD AUTO: 1.08 K/UL — SIGNIFICANT CHANGE UP (ref 1–3.3)
LYMPHOCYTES # BLD AUTO: 8 % — LOW (ref 13–44)
MANUAL SMEAR VERIFICATION: SIGNIFICANT CHANGE UP
MCHC RBC-ENTMCNC: 30.9 PG — SIGNIFICANT CHANGE UP (ref 27–34)
MCHC RBC-ENTMCNC: 32.6 GM/DL — SIGNIFICANT CHANGE UP (ref 32–36)
MCV RBC AUTO: 94.8 FL — SIGNIFICANT CHANGE UP (ref 80–100)
MONOCYTES # BLD AUTO: 1.48 K/UL — HIGH (ref 0–0.9)
MONOCYTES NFR BLD AUTO: 11 % — SIGNIFICANT CHANGE UP (ref 2–14)
NEUTROPHILS # BLD AUTO: 10.63 K/UL — HIGH (ref 1.8–7.4)
NEUTROPHILS NFR BLD AUTO: 78 % — HIGH (ref 43–77)
NEUTS BAND # BLD: 1 % — SIGNIFICANT CHANGE UP (ref 0–8)
NITRITE UR-MCNC: NEGATIVE — SIGNIFICANT CHANGE UP
NRBC # BLD: 0 /100 WBCS — SIGNIFICANT CHANGE UP (ref 0–0)
NRBC # BLD: SIGNIFICANT CHANGE UP /100 WBCS (ref 0–0)
PCO2 BLDV: 38 MMHG — LOW (ref 39–42)
PH BLDV: 7.44 — HIGH (ref 7.32–7.43)
PH UR: 6.5 — SIGNIFICANT CHANGE UP (ref 5–8)
PLAT MORPH BLD: NORMAL — SIGNIFICANT CHANGE UP
PLATELET # BLD AUTO: 190 K/UL — SIGNIFICANT CHANGE UP (ref 150–400)
PO2 BLDV: 74 MMHG — HIGH (ref 25–45)
POTASSIUM SERPL-MCNC: 3.5 MMOL/L — SIGNIFICANT CHANGE UP (ref 3.5–5.3)
POTASSIUM SERPL-SCNC: 3.5 MMOL/L — SIGNIFICANT CHANGE UP (ref 3.5–5.3)
PROT SERPL-MCNC: 7.1 GM/DL — SIGNIFICANT CHANGE UP (ref 6–8.3)
PROT SERPL-MCNC: 7.5 GM/DL — SIGNIFICANT CHANGE UP (ref 6–8.3)
PROT UR-MCNC: NEGATIVE MG/DL — SIGNIFICANT CHANGE UP
PROTHROM AB SERPL-ACNC: 11.2 SEC — SIGNIFICANT CHANGE UP (ref 9.5–13)
PROTHROM AB SERPL-ACNC: 11.5 SEC — SIGNIFICANT CHANGE UP (ref 9.5–13)
RBC # BLD: 5.43 M/UL — HIGH (ref 3.8–5.2)
RBC # FLD: 15.4 % — HIGH (ref 10.3–14.5)
RBC BLD AUTO: NORMAL — SIGNIFICANT CHANGE UP
RSV RNA NPH QL NAA+NON-PROBE: SIGNIFICANT CHANGE UP
SAO2 % BLDV: 95 % — HIGH (ref 67–88)
SARS-COV-2 RNA SPEC QL NAA+PROBE: DETECTED
SODIUM SERPL-SCNC: 136 MMOL/L — SIGNIFICANT CHANGE UP (ref 135–145)
SP GR SPEC: >1.03 — HIGH (ref 1–1.03)
UROBILINOGEN FLD QL: 0.2 MG/DL — SIGNIFICANT CHANGE UP (ref 0.2–1)
VARIANT LYMPHS # BLD: 1 % — SIGNIFICANT CHANGE UP (ref 0–6)
WBC # BLD: 13.46 K/UL — HIGH (ref 3.8–10.5)
WBC # FLD AUTO: 13.46 K/UL — HIGH (ref 3.8–10.5)

## 2024-07-15 PROCEDURE — 71045 X-RAY EXAM CHEST 1 VIEW: CPT | Mod: 26

## 2024-07-15 PROCEDURE — 82550 ASSAY OF CK (CPK): CPT

## 2024-07-15 PROCEDURE — 93010 ELECTROCARDIOGRAM REPORT: CPT

## 2024-07-15 PROCEDURE — 80076 HEPATIC FUNCTION PANEL: CPT

## 2024-07-15 PROCEDURE — 83615 LACTATE (LD) (LDH) ENZYME: CPT

## 2024-07-15 PROCEDURE — 80048 BASIC METABOLIC PNL TOTAL CA: CPT

## 2024-07-15 PROCEDURE — 36415 COLL VENOUS BLD VENIPUNCTURE: CPT

## 2024-07-15 PROCEDURE — 97163 PT EVAL HIGH COMPLEX 45 MIN: CPT | Mod: GP

## 2024-07-15 PROCEDURE — 72148 MRI LUMBAR SPINE W/O DYE: CPT | Mod: MC

## 2024-07-15 PROCEDURE — 85027 COMPLETE CBC AUTOMATED: CPT

## 2024-07-15 PROCEDURE — 74177 CT ABD & PELVIS W/CONTRAST: CPT | Mod: 26,MC

## 2024-07-15 PROCEDURE — 84484 ASSAY OF TROPONIN QUANT: CPT

## 2024-07-15 PROCEDURE — 85610 PROTHROMBIN TIME: CPT

## 2024-07-15 PROCEDURE — 99285 EMERGENCY DEPT VISIT HI MDM: CPT

## 2024-07-15 PROCEDURE — 85379 FIBRIN DEGRADATION QUANT: CPT

## 2024-07-15 PROCEDURE — 99223 1ST HOSP IP/OBS HIGH 75: CPT

## 2024-07-15 PROCEDURE — 97116 GAIT TRAINING THERAPY: CPT | Mod: GP

## 2024-07-15 PROCEDURE — 86140 C-REACTIVE PROTEIN: CPT

## 2024-07-15 PROCEDURE — 71275 CT ANGIOGRAPHY CHEST: CPT | Mod: 26,MC

## 2024-07-15 PROCEDURE — 82565 ASSAY OF CREATININE: CPT

## 2024-07-15 PROCEDURE — 97530 THERAPEUTIC ACTIVITIES: CPT | Mod: GP

## 2024-07-15 PROCEDURE — 84100 ASSAY OF PHOSPHORUS: CPT

## 2024-07-15 PROCEDURE — 83735 ASSAY OF MAGNESIUM: CPT

## 2024-07-15 PROCEDURE — 82728 ASSAY OF FERRITIN: CPT

## 2024-07-15 PROCEDURE — 83036 HEMOGLOBIN GLYCOSYLATED A1C: CPT

## 2024-07-15 PROCEDURE — 82962 GLUCOSE BLOOD TEST: CPT

## 2024-07-15 PROCEDURE — 84145 PROCALCITONIN (PCT): CPT

## 2024-07-15 RX ORDER — EMPAGLIFLOZIN 25 MG/1
1 TABLET, FILM COATED ORAL
Refills: 0 | DISCHARGE

## 2024-07-15 RX ORDER — LOSARTAN POTASSIUM 100 MG/1
1 TABLET, FILM COATED ORAL
Refills: 0 | DISCHARGE

## 2024-07-15 RX ORDER — MECLIZINE HCL 25 MG
12.5 TABLET ORAL THREE TIMES A DAY
Refills: 0 | Status: DISCONTINUED | OUTPATIENT
Start: 2024-07-15 | End: 2024-07-20

## 2024-07-15 RX ORDER — DEXTROSE 30 % IN WATER 30 %
25 VIAL (ML) INTRAVENOUS ONCE
Refills: 0 | Status: DISCONTINUED | OUTPATIENT
Start: 2024-07-15 | End: 2024-07-20

## 2024-07-15 RX ORDER — DEXTROSE MONOHYDRATE AND SODIUM CHLORIDE 5; .3 G/100ML; G/100ML
1000 INJECTION, SOLUTION INTRAVENOUS
Refills: 0 | Status: DISCONTINUED | OUTPATIENT
Start: 2024-07-15 | End: 2024-07-20

## 2024-07-15 RX ORDER — ASPIRIN 325 MG/1
81 TABLET, FILM COATED ORAL DAILY
Refills: 0 | Status: DISCONTINUED | OUTPATIENT
Start: 2024-07-15 | End: 2024-07-20

## 2024-07-15 RX ORDER — REMDESIVIR 5 MG/ML
100 INJECTION INTRAVENOUS EVERY 24 HOURS
Refills: 0 | Status: DISCONTINUED | OUTPATIENT
Start: 2024-07-16 | End: 2024-07-20

## 2024-07-15 RX ORDER — DEXAMETHASONE 1 MG/1
6 TABLET ORAL DAILY
Refills: 0 | Status: DISCONTINUED | OUTPATIENT
Start: 2024-07-15 | End: 2024-07-20

## 2024-07-15 RX ORDER — ACETAMINOPHEN 325 MG
650 TABLET ORAL EVERY 4 HOURS
Refills: 0 | Status: DISCONTINUED | OUTPATIENT
Start: 2024-07-15 | End: 2024-07-20

## 2024-07-15 RX ORDER — DEXTROSE 30 % IN WATER 30 %
12.5 VIAL (ML) INTRAVENOUS ONCE
Refills: 0 | Status: DISCONTINUED | OUTPATIENT
Start: 2024-07-15 | End: 2024-07-20

## 2024-07-15 RX ORDER — FUROSEMIDE 10 MG/ML
20 INJECTION, SOLUTION INTRAMUSCULAR; INTRAVENOUS DAILY
Refills: 0 | Status: DISCONTINUED | OUTPATIENT
Start: 2024-07-15 | End: 2024-07-20

## 2024-07-15 RX ORDER — MORPHINE SULFATE 100 MG/1
2 TABLET, EXTENDED RELEASE ORAL ONCE
Refills: 0 | Status: DISCONTINUED | OUTPATIENT
Start: 2024-07-15 | End: 2024-07-15

## 2024-07-15 RX ORDER — METHYLPREDNISOLONE ACETATE 20 MG/ML
40 VIAL (ML) INJECTION ONCE
Refills: 0 | Status: COMPLETED | OUTPATIENT
Start: 2024-07-15 | End: 2024-07-15

## 2024-07-15 RX ORDER — KETOROLAC TROMETHAMINE 30 MG/ML
30 INJECTION, SOLUTION INTRAMUSCULAR ONCE
Refills: 0 | Status: DISCONTINUED | OUTPATIENT
Start: 2024-07-15 | End: 2024-07-15

## 2024-07-15 RX ORDER — INSULIN LISPRO 100 [IU]/ML
INJECTION, SOLUTION SUBCUTANEOUS AT BEDTIME
Refills: 0 | Status: DISCONTINUED | OUTPATIENT
Start: 2024-07-15 | End: 2024-07-20

## 2024-07-15 RX ORDER — ATENOLOL 50 MG/1
100 TABLET ORAL DAILY
Refills: 0 | Status: DISCONTINUED | OUTPATIENT
Start: 2024-07-15 | End: 2024-07-15

## 2024-07-15 RX ORDER — GLIPIZIDE 5 MG
1 TABLET ORAL
Refills: 0 | DISCHARGE

## 2024-07-15 RX ORDER — FLUTICASONE PROPIONATE 50 UG/1
1 SPRAY, METERED NASAL
Refills: 0 | Status: DISCONTINUED | OUTPATIENT
Start: 2024-07-15 | End: 2024-07-15

## 2024-07-15 RX ORDER — AMLODIPINE BESYLATE 2.5 MG/1
10 TABLET ORAL DAILY
Refills: 0 | Status: DISCONTINUED | OUTPATIENT
Start: 2024-07-15 | End: 2024-07-20

## 2024-07-15 RX ORDER — KETOROLAC TROMETHAMINE 30 MG/ML
15 INJECTION, SOLUTION INTRAMUSCULAR EVERY 6 HOURS
Refills: 0 | Status: DISCONTINUED | OUTPATIENT
Start: 2024-07-15 | End: 2024-07-20

## 2024-07-15 RX ORDER — REMDESIVIR 5 MG/ML
INJECTION INTRAVENOUS
Refills: 0 | Status: DISCONTINUED | OUTPATIENT
Start: 2024-07-15 | End: 2024-07-20

## 2024-07-15 RX ORDER — TAMOXIFEN CITRATE 10 MG/1
20 TABLET, FILM COATED ORAL DAILY
Refills: 0 | Status: DISCONTINUED | OUTPATIENT
Start: 2024-07-16 | End: 2024-07-20

## 2024-07-15 RX ORDER — GLUCAGON HYDROCHLORIDE 1 MG/ML
1 INJECTION, POWDER, FOR SOLUTION INTRAMUSCULAR; INTRAVENOUS; SUBCUTANEOUS ONCE
Refills: 0 | Status: DISCONTINUED | OUTPATIENT
Start: 2024-07-15 | End: 2024-07-20

## 2024-07-15 RX ORDER — SEMAGLUTIDE 1.34 MG/ML
1 INJECTION, SOLUTION SUBCUTANEOUS
Refills: 0 | DISCHARGE

## 2024-07-15 RX ORDER — DEXAMETHASONE 1 MG/1
6 TABLET ORAL ONCE
Refills: 0 | Status: COMPLETED | OUTPATIENT
Start: 2024-07-15 | End: 2024-07-15

## 2024-07-15 RX ORDER — DEXTROSE 30 % IN WATER 30 %
15 VIAL (ML) INTRAVENOUS ONCE
Refills: 0 | Status: DISCONTINUED | OUTPATIENT
Start: 2024-07-15 | End: 2024-07-20

## 2024-07-15 RX ORDER — LOSARTAN POTASSIUM 100 MG/1
100 TABLET, FILM COATED ORAL DAILY
Refills: 0 | Status: DISCONTINUED | OUTPATIENT
Start: 2024-07-15 | End: 2024-07-20

## 2024-07-15 RX ORDER — ENOXAPARIN SODIUM 100 MG/ML
110 INJECTION SUBCUTANEOUS EVERY 12 HOURS
Refills: 0 | Status: DISCONTINUED | OUTPATIENT
Start: 2024-07-15 | End: 2024-07-20

## 2024-07-15 RX ORDER — INSULIN LISPRO 100 [IU]/ML
INJECTION, SOLUTION SUBCUTANEOUS
Refills: 0 | Status: DISCONTINUED | OUTPATIENT
Start: 2024-07-15 | End: 2024-07-20

## 2024-07-15 RX ORDER — REMDESIVIR 5 MG/ML
200 INJECTION INTRAVENOUS EVERY 24 HOURS
Refills: 0 | Status: COMPLETED | OUTPATIENT
Start: 2024-07-15 | End: 2024-07-15

## 2024-07-15 RX ORDER — ACETAMINOPHEN 325 MG
1000 TABLET ORAL ONCE
Refills: 0 | Status: COMPLETED | OUTPATIENT
Start: 2024-07-15 | End: 2024-07-15

## 2024-07-15 RX ADMIN — INSULIN LISPRO 4: 100 INJECTION, SOLUTION SUBCUTANEOUS at 19:17

## 2024-07-15 RX ADMIN — Medication 40 MILLIGRAM(S): at 13:55

## 2024-07-15 RX ADMIN — KETOROLAC TROMETHAMINE 30 MILLIGRAM(S): 30 INJECTION, SOLUTION INTRAMUSCULAR at 13:55

## 2024-07-15 RX ADMIN — KETOROLAC TROMETHAMINE 15 MILLIGRAM(S): 30 INJECTION, SOLUTION INTRAMUSCULAR at 20:48

## 2024-07-15 RX ADMIN — DEXAMETHASONE 6 MILLIGRAM(S): 1 TABLET ORAL at 18:20

## 2024-07-15 RX ADMIN — ENOXAPARIN SODIUM 110 MILLIGRAM(S): 100 INJECTION SUBCUTANEOUS at 23:37

## 2024-07-15 RX ADMIN — INSULIN LISPRO 2: 100 INJECTION, SOLUTION SUBCUTANEOUS at 23:38

## 2024-07-15 RX ADMIN — REMDESIVIR 200 MILLIGRAM(S): 5 INJECTION INTRAVENOUS at 23:37

## 2024-07-15 RX ADMIN — Medication 400 MILLIGRAM(S): at 12:51

## 2024-07-15 RX ADMIN — MORPHINE SULFATE 2 MILLIGRAM(S): 100 TABLET, EXTENDED RELEASE ORAL at 12:43

## 2024-07-15 NOTE — H&P ADULT - ENT GEN HX ROS MEA POS PC
"The patient location is: home  The chief complaint leading to consultation is: PD  Visit type: audiovisual  Total time spent with patient: 20mins  Each patient to whom he or she provides medical services by telemedicine is:  (1) informed of the relationship between the physician and patient and the respective role of any other health care provider with respect to management of the patient; and (2) notified that he or she may decline to receive medical services by telemedicine and may withdraw from such care at any time.    Notes: below      MOVEMENT DISORDERS CLINIC    PCP/Referring Provider: No referring provider defined for this encounter.  Date of Service: 5/4/2020    Chief Complaint: Tremor    Interval Hx  Stopped rytary as he felt that is did not help    Was on Rytary 95mg 3 tab TID, but he discontinued.   Continues to have FOG intermittently - no change after stopping pills  He has not yet picked up a laser cane  Started and continued donepezil 5mg QHS - no issues    For sialorrhea - tried atropine drops which did not help  Still on coumadin and not a botox a canddate    Does not use an assist device - no falls    Contnues to have significant bradyphrenia    Med hx  4 tabs rytary 95mgQID made him lethargic and nauseated  No hallucinations noted    DBS panel  Since last visit, underwent DBS panel discussion  Due to atypical features (FOG, abnl processing speed, and bilaterality, medication resistance),  MRI brain was performed  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 male with a medical issues significant for Afib on Coumadin, CVA, MCI, throat cancer s/p radiation, with parkinsonism, coming for DBS eval. Daughter noted resting tremor of R hand 5 years ago which has slowly increased over time. At this point he cannot eat or write due to severity of tremor. R hand remains worse than L however he is R handed. His gait slowed soon after. He does not fall " "often. Does not use a walker or a cane. Can walk 1 block. FOG started 2 years ago. Feet stick to floor and causes imbalance.  Neuro psych eval has noticed MCI with extremely slow processing speed.    For tremor has tried  Sinemet (up to 3 tabs at a time) , primidone, rasagiline, neupro patch, amantadine, pramipexole, entacapone  Nothing has significantly helped - he stopped all therapies since it did not help tremor    Tried PT and [employer]    Neuroleptic exposure:  None"    PD Review of Symptoms:  Anosmia: yes  Dysarthria/Hypophonia: moderate  Dysphagia/Sialorrhea: Yes,   Depression: zoloft  Cognitive slowing: As above  Hallucinations: none  Impulsivity: none  Urinary changes: retension  Constipation: yes  Orthostasis: at times  Dyskinesia: none  Falls: yes  Freezing: yes  Micrographia: yes  Sleep issues:  -RBD: None    Review of Systems:   Review of Systems   Constitutional: Negative for fever.   HENT: Negative for congestion.    Eyes: Negative for double vision.   Respiratory: Negative for cough and shortness of breath.    Cardiovascular: Negative for chest pain and leg swelling.   Gastrointestinal: Negative for nausea.   Genitourinary: Negative for dysuria.   Musculoskeletal: Positive for falls.   Skin: Negative for rash.   Neurological: Positive for tremors and speech change. Negative for headaches.   Psychiatric/Behavioral: Positive for memory loss. Negative for depression.         Current Medications:  Outpatient Encounter Medications as of 5/4/2020   Medication Sig Dispense Refill    atropine 1% (ISOPTO ATROPINE) 1 % Drop Put 2 drops on tongue for sialorrhea 1 Bottle 5    carbidopa-levodopa (RYTARY) 23.75-95 mg CpSR Take 4 capsules by mouth 3 (three) times daily. (Patient not taking: Reported on 5/4/2020) 270 capsule 9    docusate sodium (COLACE) 100 MG capsule Take 100 mg by mouth 2 (two) times daily.      donepezil (ARICEPT) 5 MG tablet Take 5 mg by mouth every evening.      donepeziL (ARICEPT) 5 " MG tablet Take 2 tablets (10 mg total) by mouth every evening. 60 tablet 11    gabapentin (NEURONTIN) 300 MG capsule       glimepiride (AMARYL) 2 MG tablet Take 2 mg by mouth 2 (two) times daily.      metFORMIN (GLUCOPHAGE-XR) 500 MG 24 hr tablet Take 500 mg by mouth once daily.      mirtazapine (REMERON) 15 MG tablet Take 1 tablet (15 mg total) by mouth every evening. (Patient not taking: Reported on 5/4/2020) 90 tablet 2    mirtazapine (REMERON) 15 MG tablet Take 15 mg by mouth every evening.      propranolol (INDERAL LA) 60 MG 24 hr capsule Take 60 mg by mouth once daily.      pyridoxine, vitamin B6, (VITAMIN B-6) 100 MG Tab Take 50 mg by mouth once daily.      sertraline (ZOLOFT) 50 MG tablet Take 50 mg by mouth once daily.      simvastatin (ZOCOR) 40 MG tablet Take 40 mg by mouth every evening.      warfarin (COUMADIN) 5 MG tablet Take 5 mg by mouth every Mon, Wed, Fri. Sun Tues Thurs Sat....half tablet      [DISCONTINUED] donepezil (ARICEPT) 5 MG tablet Take 1 tablet (5 mg total) by mouth every evening. 30 tablet 11     No facility-administered encounter medications on file as of 5/4/2020.        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    Current Living Situation: home with daughter    Social:  Social History     Socioeconomic History    Marital status:      Spouse name: Not on file    Number of children: Not on file    Years of education: Not on file    Highest education level: Not on file   Occupational History    Not on file   Social Needs    Financial resource strain:  Not on file    Food insecurity:     Worry: Not on file     Inability: Not on file    Transportation needs:     Medical: Not on file     Non-medical: Not on file   Tobacco Use    Smoking status: Never Smoker    Smokeless tobacco: Never Used   Substance and Sexual Activity    Alcohol use: Yes     Alcohol/week: 1.0 standard drinks     Types: 1 Cans of beer per week    Drug use: Not on file    Sexual activity: Not on file   Lifestyle    Physical activity:     Days per week: Not on file     Minutes per session: Not on file    Stress: Not on file   Relationships    Social connections:     Talks on phone: Not on file     Gets together: Not on file     Attends Yarsani service: Not on file     Active member of club or organization: Not on file     Attends meetings of clubs or organizations: Not on file     Relationship status: Not on file   Other Topics Concern    Not on file   Social History Narrative    Not on file       Family History:  No family history on file.    PHYSICAL:  There were no vitals taken for this visit.    Physical Exam  Constitutional: Well-developed, well-nourished, appears stated age  Eyes: No scleral icterus  ENT: Moist oral mucosa  Cardiovascular: No lower extremity edema   Respiratory: No labored breathing   Skin: No rash   Hematologic: No bruising    Other: GI/ deferred   · Mental status:  Awake, alert. Anterocollis, slow processing speed, sialorrhea profound  · Speech: normal (not dysarthric), no aphasia  · Cranial nerves:            · CN II: Pupils mid-position and equal, not tested light or accommodation  · CN III, IV, VI: Extraocular movements full, no nystagmus visualized  · CN V: Not tested   · CN VII: Face strong and symmetric bilaterally   · CN VIII: Hearing intact to voice and conversation   · CN IX, X: Palate raises midline and symmetric   · CN XI: Strong shoulder shrug B/L  · CN XII: Tongue appears midline   · Motor: Normal bulk by appearance, no drift   · Sensory: Not  tested    · Gait: Not tested  · Deep tendon reflexes: Not tested  · Movement/Coordination                    Mod hypophonic speech.                     Seevre facial masking with sialorrhea  Moderate tremor R hand    Bradykinesia  ? Finger taps Finger flicks KRISTIE Heel taps   Left 1+ 1+ - -   Right 3+ 3+ - -         Laboratory Data:  NA    Imaging:  NA    Neuropsych eval Recs  Current Assessment & Plan        -Neuropsych testing showed extremely slow processing speed, but normal orientation to time and recall/memory. Additionally, his attention/working memory and verbal reasoning (ability to solve problems talking through them without speed requirements) was normal. Language skills were also normal.   -Diagnostically, he is on the borderzone between Mild Cognitive Impairment and Dementia, but his functional impairment is likely more due to motor sxs 2/2 PD than cognitive impairment at this time. So, will keep MCI dx.   -No concerns about capacity and no cognitive/behavioral contraindications prior to DBS.   -Importantly, recommend very slow discussion of DBS and asking him to restate/summarize to ensure adequate comprehension given his severely slow processing speed.   -See other recs below in report          Assessment//Plan:   Problem List Items Addressed This Visit        Neuro    Parkinsonism    Overview     2013 right hand tremor, freezing of gait. Predominantly upper extremities  Failed: sinemet, entacapone, primidone, rasagiline, neupro patch, amantadine, pramipexole, topiramate         Current Assessment & Plan     Medication-resistant PDism, with atypical features of FOG, slow processing speed. MRI brain revealed what appears as vascular PDism, explainig his atypical symptoms. He stopped rytary and reports no difference but he is visibly more bradykinetic R-side. Suggested restart rytary 95mg 3 tabs PO TID. Gait and FOG specifically are his major issues. Will try a laser can and add donepezil 10mg QHS for  balance improvement. Suggested aggressive PT.  Continue Rytary as before.         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               Follow-up: 2mos  Discussed the importance of ongoing exercise in efforts to improve mobility and balance.    Kinjal Oh MD, MS  [facility]  Department of Neurology  Movement Disorders         nasal congestion

## 2024-07-15 NOTE — ED ADULT NURSE NOTE - CHIEF COMPLAINT QUOTE
pt reporting severe lower back pain radiating down right leg, so painful she's unable to walk well. denies injury. Malawian speaking, presents w/son

## 2024-07-15 NOTE — H&P ADULT - NSHPPHYSICALEXAM_GEN_ALL_CORE
Vital Signs Last 24 Hrs  T(C): 37.6 (15 Jul 2024 11:06), Max: 37.6 (15 Jul 2024 11:06)  T(F): 99.6 (15 Jul 2024 11:06), Max: 99.6 (15 Jul 2024 11:06)  HR: 114 (15 Jul 2024 12:55) (114 - 115)  BP: 140/86 (15 Jul 2024 12:55) (126/72 - 140/86)  BP(mean): 98 (15 Jul 2024 12:55) (88 - 98)  RR: 18 (15 Jul 2024 12:55) (18 - 20)  SpO2: 95% (15 Jul 2024 12:55) (89% - 95%)    Parameters below as of 15 Jul 2024 12:55  Patient On (Oxygen Delivery Method): nasal cannula  O2 Flow (L/min): 2

## 2024-07-15 NOTE — PHARMACOTHERAPY INTERVENTION NOTE - OUTCOME
- Laboratory Results


Result Diagrams: 


                                 11/01/18 02:04





                                 11/01/18 02:04





- ECG


O2 Sat by Pulse Oximetry: 99


Pulse Ox Interpretation: Normal





- Progress


ED Course And Treament: 





700:  Took over care from Dr. Vick.  Fu on sobriety.  No injury identified.  

Got haldol and ativan.  





914:  Stable.  Alert.  Ambulated with no issues.  No pain.  Tolerated PO.  FU 

with pcp.  AAOx3.  Clinical sobriety reached. 





Disposition





- Clinical Impression


Clinical Impression: 


 Alcohol intoxication








- POA


Present On Arrival: None





- Disposition


Referrals: 


[facility] [Outside] - 11/02/18


Disposition: Routine/Home


Disposition Time: 09:16


Condition: STABLE


Additional Instructions: 


Return if not better in 3 days.


Instructions:  Alcohol Abuse and Alcoholism (DC) accepted

## 2024-07-15 NOTE — H&P ADULT - HISTORY OF PRESENT ILLNESS
53 year old female w hx breeast CA, DM, HTN came to ED in wheelchair c/o LBP    pain is on the left lower back radiates down into the left posterior thigh.    Has been unable to walk due to the symptoms.  Also endorses cough x 2 days.  Seen in super track, found to be hypoxic to 87% on room air and brought to the main.  She denies recent travel or sick contacts.  No history of DVT/PE.    In ED /72      RR 20   T 99.6   89% sat RA        PAST MEDICAL HX  Bladder prolapse, female, acquired   Essential hypertension  Infiltrating ductal carcinoma of right breast 1.2 cm mass ER+  Malignant neoplasm of right female breast, unspecified estrogen receptor status, unspecified site of breast on tamoxifen  RT radiation therapy completed   Leukocytosis  Neoplasm of uncertain behavior of skin   Primary osteoarthritis of both knees   Type 2 diabetes mellitus without complication, without long-term current use of insulin   Uterine leiomyoma, unspecified location 2011.     PAST SURGICAL HX  R breast lumpectomy w R sentinel node bx   H/O prior ablation treatment 2011  H/O tubal ligation 1997, bilateral  Status post fine needle biopsy 7/16/18.     FAMILY HX  Father  Still living? No  Family history of stroke, Age at diagnosis: Age Unknown    Mother  Still living? No  Family history of diabetes mellitus, Age at diagnosis: Age Unknown  Family history of hypertension, Age at diagnosis: Age Unknown  Family history of kidney disease, Age at diagnosis: Age Unknown.      SOCIAL HX  former smoker quit 2021; smoked 2-3 cigarettes/day x 7 years 53 year old female w hx breast CA s/p lumpectomy, RT on tamoxifen,, DM II, HTN came to ED in wheelchair c/o LBP    pain is  left lower back radiates down into left posterior thigh, started Saturday   Has been unable to walk due to pain  + cough x 2 days.  Seen in super track, found to be hypoxic to 87% on room air and brought to the main  denies recent travel or sick contacts    In ED /72      RR 20   T 99.6   89% sat RA  2 L nc  CXR no acute disease; CTA no TN, no PNA  solumedrol 40 mg then decadron 6 mg    She is being admitted for acute hypoxic resp failure due to COVID-19, intractable LBP L    PAST MEDICAL HX  Bladder prolapse, female, acquired   Essential hypertension  Infiltrating ductal carcinoma of right breast 1.2 cm mass ER+  Malignant neoplasm of right female breast, unspecified estrogen receptor status, unspecified site of breast on tamoxifen  RT radiation therapy completed   Leukocytosis  Neoplasm of uncertain behavior of skin   Primary osteoarthritis of both knees   Type 2 diabetes mellitus without complication, without long-term current use of insulin   Uterine leiomyoma, unspecified location 2011.     PAST SURGICAL HX  R breast lumpectomy w R sentinel node bx   H/O prior ablation treatment 2011  H/O tubal ligation 1997, bilateral  Status post fine needle biopsy 7/16/18.     FAMILY HX  Father  Still living? No  Family history of stroke, Age at diagnosis: Age Unknown    Mother  Still living? No  Family history of diabetes mellitus, Age at diagnosis: Age Unknown  Family history of hypertension, Age at diagnosis: Age Unknown  Family history of kidney disease, Age at diagnosis: Age Unknown.      SOCIAL HX  works as a   former smoker quit 2021; smoked 2-3 cigarettes/day x 7 years  no alcohol  no drugs

## 2024-07-15 NOTE — ED ADULT TRIAGE NOTE - CHIEF COMPLAINT QUOTE
pt reporting severe lower back pain radiating down right leg, so painful she's unable to walk well. denies injury. Vietnamese speaking, presents w/son

## 2024-07-15 NOTE — ED STATDOCS - PROGRESS NOTE DETAILS
Anders Mo for attending Dr. Nicole: 54 y/o female with a PMHx of bladder prolapse, DM2, essential HTN, malignant neoplasm of right breast, uterine leiomyoma presents to the ED c/o back pain and cough x2 days. Pt hypoxic to 87% on RA. No other complaints at this time. Will send pt to main ED for further evaluation.

## 2024-07-15 NOTE — ED PROVIDER NOTE - OBJECTIVE STATEMENT
53-year-old female PMH HTN, DM, presents to the emergency department for lower back pain.  Patient states pain is on the left lower back radiates down into the left posterior thigh.  Has been unable to walk due to the symptoms.  Also endorses cough x 2 days.  Seen in super track, found to be hypoxic to 87% on room air and brought to the main.  She denies recent travel or sick contacts.  No history of DVT/PE.

## 2024-07-15 NOTE — PHARMACOTHERAPY INTERVENTION NOTE - COMMENTS
Medication reconciliation completed.  Reviewed Medication list and confirmed med allergies with patient; confirmed with Dr. First Meddwayne.

## 2024-07-15 NOTE — H&P ADULT - NSHPLABSRESULTS_GEN_ALL_CORE
FINDINGS:    CT CHEST:    HEART/VASCULATURE: Normal sized heart and aorta. No pericardial effusion.   No pulmonary embolus.    LUNGS/AIRWAYS/PLEURA: No endobronchial lesion. Low lung volumes. Clear   lungs. No pleural abnormality. Elevated right hemidiaphragm.    LYMPH NODES/MEDIASTINUM: No lymphadenopathy.    BONES/SOFT TISSUES: Unremarkable.        CT ABDOMEN/PELVIS:    LIVER: Unremarkable.    BILIARY SYSTEM: Unremarkable.    SPLEEN:Unremarkable.    PANCREAS: Unremarkable.    ADRENALS: Unremarkable.    KIDNEYS/URINARY TRACT: Unremarkable.    GASTROINTESTINAL TRACT: Unremarkable.    REPRODUCTIVE ORGANS: Unremarkable uterus. Bilateral adnexal   calcifications.    LYMPH NODES/PERITONEUM: Unremarkable.    VASCULATURE: Normal aortic size. Patent portal veins.    BONES/SOFT TISSUES: No bone lesion. Mild nonspecific stranding of the   subcutaneous fat in the right abdominal wall.      IMPRESSION:    No pulmonary embolus.    No pneumonia.    Nonspecific mild stranding of the subcutaneous fat in the right anterior   abdominal wall. Correlate for cellulitis.

## 2024-07-15 NOTE — ED STATDOCS - CHIEF COMPLAINT
"-- DO NOT REPLY / DO NOT REPLY ALL --  -- Message is from the [employer]--    General Patient Message      Reason for Call: Please follow up with patient as soon as possible. He needs a placard form filled out. Caller Information       Type Contact Phone    11/01/2021 10:37 AM CDT Phone (Incoming) Karmen Hernandez (Self) [phone] (H)          Alternative phone number: None     Turnaround time given to caller: ""This message will be sent to [facility] Provider's name]. The clinical team will fulfill your request as soon as they review your message. \""    " The patient is a 53y year old Female complaining of back pain general.

## 2024-07-15 NOTE — ED PROVIDER NOTE - PHYSICAL EXAMINATION
GENERAL: A&Ox4, non-toxic appearing, no acute distress  HEENT: NCAT, EOMI, oral mucosa moist, normal conjunctiva  RESP: CTAB, no respiratory distress, no wheezes/rhonchi/rales, speaking in full sentences  CV: RRR, no murmurs/rubs/gallops  ABDOMEN: soft, non-tender, non-distended, no guarding, no rebound tenderness  MSK: no visible deformities, no midline spinal tenderness, no step-offs, no paraspinal tenderness  NEURO: no focal sensory or motor deficits, CN 2-12 grossly intact  SKIN: warm, normal color, well perfused,  blanching macular rash posterior bilateral thighs  PSYCH: normal affect

## 2024-07-15 NOTE — ED PROVIDER NOTE - CLINICAL SUMMARY MEDICAL DECISION MAKING FREE TEXT BOX
53-year-old female presenting with lower back pain radiating to the posterior left thigh.  History and exam most consistent with sciatica pain.  Patient also found to be febrile, has symptoms of cough.  Was hypoxic in triage.  Concern for pneumonia, viral illness, CHF, DVT/PE, less likely ACS.  Will evaluate with blood work, CTA chest/abdomen/pelvis, symptomatic treatment, reassess.

## 2024-07-15 NOTE — ED ADULT NURSE NOTE - OBJECTIVE STATEMENT
54 y/o female presents to the ED c/o lower back pain, R hip pain, and b/l knee pain. Pt primary language is Nigerian, son, Elvis, at bedside and translating. Pt is ambulatory at baseline, but hasn't been able to walk that much due to the pain. Pt PMHx breast cancer (denies being on chemotherapy or radiation at this time). Cardiac monitor in place, blood drawn and sent to lab, Awaiting MD aldrich. Safety and comfort maintained. 52 y/o female presents to the ED c/o left lower back pain, L hip pain radiating to L thigh, and b/l knee pain. Pt primary language is Mosotho, son, Elvis, at bedside and translating. Pt is ambulatory at baseline, but hasn't been able to walk that much due to the pain. Pt also endorses cough, O2 sat 87% on room air in triage, upon initial assessment O2 sat 96% on 2L NC. Pt PMHx breast cancer (denies being on chemotherapy or radiation at this time). Cardiac monitor in place, blood drawn and sent to lab, Awaiting MD aldrich. Safety and comfort maintained.

## 2024-07-15 NOTE — H&P ADULT - ASSESSMENT
53 year old female w hx breast CA s/p lumpectomy, RT on tamoxifen,, DM II, HTN came to ED in wheelchair c/o LBP  Hypoxic in ED        #Acute hypoxic resp failure  #COVID-19 detected  CTA no PE no PNA  1. admit to med  2. resp and contact for COVID-19  3. supplemental O2  4. given solumedrol 40 mg and decadron 6 mg IV in ED  5. continue decadron 6 mg IV  6. start remdesivir  7. inflammatory markers in AM      #Intractable LBP w sciatica on L  had partial relief w meds in ED  1. ketorolac 15 mg Oq 6 hrs prn mod pain  2. flexeril 10 mg TID prn spasm  3. warm compresses  4. PT evaluation        #Breast CA hx  1. continue tamoxifen      #DM II  1. hold oral agents  2. diet  3. BGM  4. ISS  5. informed that steroids will worsen glucose control in hospital        #HTN  1. continue home meds      #Obesity  is on mounjaro  1. hold       #VTE  lovenox q 12 for COVID/obesity        #80 minutes

## 2024-07-16 LAB
A1C WITH ESTIMATED AVERAGE GLUCOSE RESULT: 7.5 % — HIGH (ref 4–5.6)
ALBUMIN SERPL ELPH-MCNC: 2.9 G/DL — LOW (ref 3.3–5)
ALP SERPL-CCNC: 75 U/L — SIGNIFICANT CHANGE UP (ref 40–120)
ALT FLD-CCNC: 21 U/L — SIGNIFICANT CHANGE UP (ref 12–78)
AST SERPL-CCNC: 15 U/L — SIGNIFICANT CHANGE UP (ref 15–37)
BILIRUB DIRECT SERPL-MCNC: 0.1 MG/DL — SIGNIFICANT CHANGE UP (ref 0–0.3)
BILIRUB INDIRECT FLD-MCNC: 0.4 MG/DL — SIGNIFICANT CHANGE UP (ref 0.2–1)
BILIRUB SERPL-MCNC: 0.5 MG/DL — SIGNIFICANT CHANGE UP (ref 0.2–1.2)
CREAT SERPL-MCNC: 0.69 MG/DL — SIGNIFICANT CHANGE UP (ref 0.5–1.3)
EGFR: 104 ML/MIN/1.73M2 — SIGNIFICANT CHANGE UP
ESTIMATED AVERAGE GLUCOSE: 169 MG/DL — HIGH (ref 68–114)
GLUCOSE BLDC GLUCOMTR-MCNC: 174 MG/DL — HIGH (ref 70–99)
GLUCOSE BLDC GLUCOMTR-MCNC: 266 MG/DL — HIGH (ref 70–99)
GLUCOSE BLDC GLUCOMTR-MCNC: 273 MG/DL — HIGH (ref 70–99)
GLUCOSE BLDC GLUCOMTR-MCNC: 297 MG/DL — HIGH (ref 70–99)
INR BLD: 0.99 RATIO — SIGNIFICANT CHANGE UP (ref 0.85–1.18)
LDH SERPL L TO P-CCNC: 311 U/L — HIGH (ref 84–241)
MAGNESIUM SERPL-MCNC: 2 MG/DL — SIGNIFICANT CHANGE UP (ref 1.6–2.6)
PHOSPHATE SERPL-MCNC: 3.6 MG/DL — SIGNIFICANT CHANGE UP (ref 2.5–4.5)
PROT SERPL-MCNC: 7.2 GM/DL — SIGNIFICANT CHANGE UP (ref 6–8.3)
PROTHROM AB SERPL-ACNC: 11.2 SEC — SIGNIFICANT CHANGE UP (ref 9.5–13)
TROPONIN I, HIGH SENSITIVITY RESULT: 11.06 NG/L — SIGNIFICANT CHANGE UP

## 2024-07-16 PROCEDURE — 99233 SBSQ HOSP IP/OBS HIGH 50: CPT

## 2024-07-16 RX ORDER — OXYCODONE HYDROCHLORIDE 100 MG/5ML
5 SOLUTION ORAL EVERY 6 HOURS
Refills: 0 | Status: DISCONTINUED | OUTPATIENT
Start: 2024-07-16 | End: 2024-07-20

## 2024-07-16 RX ADMIN — TAMOXIFEN CITRATE 20 MILLIGRAM(S): 10 TABLET, FILM COATED ORAL at 09:31

## 2024-07-16 RX ADMIN — KETOROLAC TROMETHAMINE 15 MILLIGRAM(S): 30 INJECTION, SOLUTION INTRAMUSCULAR at 08:28

## 2024-07-16 RX ADMIN — LOSARTAN POTASSIUM 100 MILLIGRAM(S): 100 TABLET, FILM COATED ORAL at 09:30

## 2024-07-16 RX ADMIN — ENOXAPARIN SODIUM 110 MILLIGRAM(S): 100 INJECTION SUBCUTANEOUS at 22:09

## 2024-07-16 RX ADMIN — DEXAMETHASONE 6 MILLIGRAM(S): 1 TABLET ORAL at 09:30

## 2024-07-16 RX ADMIN — INSULIN LISPRO 6: 100 INJECTION, SOLUTION SUBCUTANEOUS at 17:26

## 2024-07-16 RX ADMIN — Medication 10 MILLIGRAM(S): at 22:11

## 2024-07-16 RX ADMIN — Medication 10 MILLIGRAM(S): at 06:41

## 2024-07-16 RX ADMIN — REMDESIVIR 200 MILLIGRAM(S): 5 INJECTION INTRAVENOUS at 22:13

## 2024-07-16 RX ADMIN — FUROSEMIDE 20 MILLIGRAM(S): 10 INJECTION, SOLUTION INTRAMUSCULAR; INTRAVENOUS at 09:30

## 2024-07-16 RX ADMIN — Medication 650 MILLIGRAM(S): at 08:28

## 2024-07-16 RX ADMIN — Medication 1 APPLICATION(S): at 12:09

## 2024-07-16 RX ADMIN — ENOXAPARIN SODIUM 110 MILLIGRAM(S): 100 INJECTION SUBCUTANEOUS at 09:30

## 2024-07-16 RX ADMIN — AMLODIPINE BESYLATE 10 MILLIGRAM(S): 2.5 TABLET ORAL at 09:30

## 2024-07-16 RX ADMIN — INSULIN LISPRO 2: 100 INJECTION, SOLUTION SUBCUTANEOUS at 22:10

## 2024-07-16 RX ADMIN — INSULIN LISPRO 6: 100 INJECTION, SOLUTION SUBCUTANEOUS at 13:17

## 2024-07-16 RX ADMIN — ASPIRIN 81 MILLIGRAM(S): 325 TABLET, FILM COATED ORAL at 09:30

## 2024-07-16 RX ADMIN — INSULIN LISPRO 2: 100 INJECTION, SOLUTION SUBCUTANEOUS at 08:28

## 2024-07-16 NOTE — PHYSICAL THERAPY INITIAL EVALUATION ADULT - LEVEL OF INDEPENDENCE: SUPINE/SIT, REHAB EVAL
pt refused transfer / gait assessment due to c/o increased LBP upon attempt to ambulate to BR with nursing earlier today

## 2024-07-16 NOTE — PHYSICAL THERAPY INITIAL EVALUATION ADULT - CRITERIA FOR SKILLED THERAPEUTIC INTERVENTIONS
will attempt completion of Eval @ later date; pt currently refusing functional assessment due to c/o LBP; further course of PT intervention pending

## 2024-07-16 NOTE — PHYSICAL THERAPY INITIAL EVALUATION ADULT - MODALITIES TREATMENT COMMENTS
pt left in bed supine post Eval @ pt request; bed alarm on; O2 2L/min nc in place; callbell in reach; jeffrey well; pain 7/10; RN Dolly aware; isolation maintained

## 2024-07-16 NOTE — PATIENT PROFILE ADULT - FALL HARM RISK - UNIVERSAL INTERVENTIONS
Bed in lowest position, wheels locked, appropriate side rails in place/Call bell, personal items and telephone in reach/Instruct patient to call for assistance before getting out of bed or chair/Non-slip footwear when patient is out of bed/[place] to call system/Physically safe environment - no spills, clutter or unnecessary equipment/Purposeful Proactive Rounding/Room/bathroom lighting operational, light cord in reach

## 2024-07-16 NOTE — PATIENT PROFILE ADULT - FALL HARM RISK - TYPE OF ASSESSMENT
Cryotherapy Text: The wound bed was treated with cryotherapy after the biopsy was performed. Silver Nitrate Text: The wound bed was treated with silver nitrate after the biopsy was performed. Hemostasis: Drysol Destruction After The Procedure: No Dressing: bandage Electrodesiccation Text: The wound bed was treated with electrodesiccation after the biopsy was performed. Wound Care: Petrolatum Anesthesia Volume In Cc: 0.5 Electrodesiccation And Curettage Text: The wound bed was treated with electrodesiccation and curettage after the biopsy was performed. Type Of Destruction Used: Curettage Post-Care Instructions: I reviewed with the patient in detail post-care instructions. Patient is to keep the biopsy site dry overnight, and then apply bacitracin twice daily until healed. Patient may apply hydrogen peroxide soaks to remove any crusting. X Size Of Lesion In Cm: 0 Was A Bandage Applied: Yes Curettage Text: The wound bed was treated with curettage after the biopsy was performed. Billing Type: Third-Party Bill Biopsy Type: H and E Detail Level: Detailed Consent: Written consent was obtained and risks were reviewed including but not limited to scarring, infection, bleeding, scabbing, incomplete removal, nerve damage and allergy to anesthesia. Biopsy Method: Dermablade Anesthesia Type: 1% lidocaine with epinephrine Notification Instructions: Patient will be notified of biopsy results. However, patient instructed to call the office if not contacted within 2 weeks. Admission

## 2024-07-16 NOTE — PROGRESS NOTE ADULT - ASSESSMENT
53 year old female w hx breast CA s/p lumpectomy, RT on tamoxifen,, DM II, HTN came to ED in wheelchair c/o LBP  Hypoxic in ED        #Acute hypoxic resp failure  #COVID-19 detected  CTA no PE no PNA  1. admit to med  2. resp and contact for COVID-19  3. supplemental O2  4. given solumedrol 40 mg and decadron 6 mg IV in ED  5. continue decadron 6 mg IV  6. start remdesivir  7. inflammatory markers in AM      #Intractable LBP w sciatica on L  had partial relief w meds in ED  1. ketorolac 15 mg Oq 6 hrs prn mod pain  2. flexeril 10 mg TID prn spasm  3. warm compresses  4. PT evaluation        #Breast CA hx  1. continue tamoxifen      #DM II  1. hold oral agents  2. diet  3. BGM  4. ISS  5. informed that steroids will worsen glucose control in hospital        #HTN  1. continue home meds      #Obesity  is on mounjaro  1. hold       #VTE  lovenox q 12 for COVID/obesity   53 year old female w hx breast CA s/p lumpectomy, RT on tamoxifen,, DM II, HTN came to ED in wheelchair c/o LBP  Hypoxic in ED        #Acute hypoxic resp failure  #COVID-19 detected  CTA no PE no PNA  Resp and contact for COVID-19  supplemental O2  S/p solumedrol 40 mg in ED  c/w  decadron 6 mg IV QD  C/w  remdesivir  F/u inflammatory       #Intractable LBP w sciatica on L  had partial relief w meds  C/w  ketorolac 15 mg Oq 6 hrs prn mod pain and  flexeril 10 mg TID prn spasm  Add Oxy PRN  On Decadrone   Imaging reveiwed, no abd bone findings  Will ask spinal Sx eval         #Breast CA hx   continue tamoxifen      #DM II  hold oral agents   diet  BGM  ISS    #HTN  continue  amlodipine and losartan       #Obesity  is on mounjaro  hold  while in the hospital       #VTE  lovenox q 12 for COVID/obesity

## 2024-07-16 NOTE — PATIENT PROFILE ADULT - PACKS PER DAY
[facility]' Remote INR 2.8 on 3/8/23. [facility] to contact pt's  Grady after 4:30pm today per their request.   0

## 2024-07-16 NOTE — PROGRESS NOTE ADULT - SUBJECTIVE AND OBJECTIVE BOX
CC: acute hypoxic resp failure due to  COVID-19  intractable back pain    HPI: 53 year old female w hx breast CA s/p lumpectomy, RT on tamoxifen,, DM II, HTN came to ED in wheelchair c/o LBP  pain is  left lower back radiates down into left posterior thigh, started Saturday   Has been unable to walk due to pain  + cough x 2 days.  Seen in super track, found to be hypoxic to 87% on room air and brought to the main  denies recent travel or sick contacts    In ED /72      RR 20   T 99.6   89% sat RA  2 L nc  CXR no acute disease; CTA no [place], no PNA  solumedrol 40 mg then decadron 6 mg    She is being admitted for acute hypoxic resp failure due to COVID-19, intractable LBP L          INTERVAL HPI/OVERNIGHT EVENTS:    Vital Signs Last 24 Hrs  T(C): 36.7 (16 Jul 2024 07:49), Max: 37.6 (15 Jul 2024 18:23)  T(F): 98.1 (16 Jul 2024 07:49), Max: 99.7 (15 Jul 2024 18:23)  HR: 114 (16 Jul 2024 07:49) (101 - 114)  BP: 110/76 (16 Jul 2024 07:49) (105/91 - 197/85)  BP(mean): 104 (15 Jul 2024 20:20) (98 - 104)  RR: 18 (16 Jul 2024 07:49) (17 - 22)  SpO2: 95% (16 Jul 2024 07:49) (92% - 99%)    Parameters below as of 16 Jul 2024 07:49  Patient On (Oxygen Delivery Method): nasal cannula  O2 Flow (L/min): 2    I&O's Detail    REVIEW OF SYSTEMS:    CONSTITUTIONAL: No weakness, fevers or chills  EYES/ENT: No visual changes;  No vertigo or throat pain   NECK: No pain or stiffness  RESPIRATORY: No cough, wheezing, hemoptysis; No shortness of breath  CARDIOVASCULAR: No chest pain or palpitations  GASTROINTESTINAL: No abdominal or epigastric pain. No nausea, vomiting, or hematemesis; No diarrhea or constipation. No melena or hematochezia.  GENITOURINARY: No dysuria, frequency or hematuria  NEUROLOGICAL: No numbness or weakness  SKIN: No itching, burning, rashes, or lesions   All other review of systems is negative unless indicated above.  PHYSICAL EXAM:    General: in no acute distress  Eyes: PERRLA, EOMI; conjunctiva and sclera clear  Head: Normocephalic; atraumatic  ENMT: No nasal discharge; airway clear  Neck: Supple; non tender; no masses  Respiratory: No wheezes, rales or rhonchi  Cardiovascular: Regular rate and rhythm. S1 and S2 Normal; No murmurs, gallops or rubs  Gastrointestinal: Soft non-tender non-distended; Normal bowel sounds  Genitourinary: No  suprapubic  tenderness  Extremities: Normal range of motion, No clubbing, cyanosis or edema  Vascular: Peripheral pulses palpable 2+ bilaterally  Neurological: Alert and oriented x4  Skin: Warm and dry. No acute rash  Lymph Nodes: No acute cervical adenopathy  Musculoskeletal: Normal muscle tone, without deformities  Psychiatric: Cooperative and appropriate  CARDIAC MARKERS ( 15 Jul 2024 19:42 )  x     / x     / 45 U/L / x     / x                                16.8   13.46 )-----------( 190      ( 15 Jul 2024 12:07 )             51.5     16 Jul 2024 07:59    x      |  x      |  x      ----------------------------<  x      x       |  x      |  0.69     Ca    9.1        15 Jul 2024 12:07  Phos  3.6       16 Jul 2024 07:59  Mg     2.0       16 Jul 2024 07:59    TPro  7.2    /  Alb  2.9    /  TBili  0.5    /  DBili  0.1    /  AST  15     /  ALT  21     /  AlkPhos  75     16 Jul 2024 07:59    PT/INR - ( 16 Jul 2024 07:59 )   PT: 11.2 sec;   INR: 0.99 ratio         PTT - ( 15 Jul 2024 12:07 )  PTT:26.9 sec  CAPILLARY BLOOD GLUCOSE      POCT Blood Glucose.: 273 mg/dL (16 Jul 2024 13:16)  POCT Blood Glucose.: 174 mg/dL (16 Jul 2024 08:27)  POCT Blood Glucose.: 261 mg/dL (15 Jul 2024 23:35)  POCT Blood Glucose.: 223 mg/dL (15 Jul 2024 18:57)    LIVER FUNCTIONS - ( 16 Jul 2024 07:59 )  Alb: 2.9 g/dL / Pro: 7.2 gm/dL / ALK PHOS: 75 U/L / ALT: 21 U/L / AST: 15 U/L / GGT: x           Urinalysis Basic - ( 15 Jul 2024 13:53 )    Color: Yellow / Appearance: Clear / SG: >1.030 / pH: x  Gluc: x / Ketone: Trace mg/dL  / Bili: Negative / Urobili: 0.2 mg/dL   Blood: x / Protein: Negative mg/dL / Nitrite: Negative   Leuk Esterase: Negative / RBC: x / WBC x   Sq Epi: x / Non Sq Epi: x / Bacteria: x        MEDICATIONS  (STANDING):  amLODIPine   Tablet 10 milliGRAM(s) Oral daily  aspirin enteric coated 81 milliGRAM(s) Oral daily  chlorhexidine 4% Liquid 1 Application(s) Topical <User Schedule>  dexAMETHasone  Injectable 6 milliGRAM(s) IV Push daily  dextrose 5%. 1000 milliLiter(s) (100 mL/Hr) IV Continuous <Continuous>  dextrose 5%. 1000 milliLiter(s) (50 mL/Hr) IV Continuous <Continuous>  dextrose 50% Injectable 25 Gram(s) IV Push once  dextrose 50% Injectable 25 Gram(s) IV Push once  dextrose 50% Injectable 12.5 Gram(s) IV Push once  enoxaparin Injectable 110 milliGRAM(s) SubCutaneous every 12 hours  furosemide    Tablet 20 milliGRAM(s) Oral daily  glucagon  Injectable 1 milliGRAM(s) IntraMuscular once  insulin lispro (ADMELOG) corrective regimen sliding scale   SubCutaneous three times a day before meals  insulin lispro (ADMELOG) corrective regimen sliding scale   SubCutaneous at bedtime  losartan 100 milliGRAM(s) Oral daily  remdesivir  IVPB 100 milliGRAM(s) IV Intermittent every 24 hours  remdesivir  IVPB   IV Intermittent   tamoxifen 20 milliGRAM(s) Oral daily    MEDICATIONS  (PRN):  acetaminophen     Tablet .. 650 milliGRAM(s) Oral every 4 hours PRN Temp greater or equal to 38.5C (101.3F)  cyclobenzaprine 10 milliGRAM(s) Oral three times a day PRN Muscle Spasm  dextrose Oral Gel 15 Gram(s) Oral once PRN Blood Glucose LESS THAN 70 milliGRAM(s)/deciliter  ketorolac   Injectable 15 milliGRAM(s) IV Push every 6 hours PRN Moderate Pain (4 - 6)  meclizine 12.5 milliGRAM(s) Oral three times a day PRN for dizziness      RADIOLOGY & ADDITIONAL TESTS:      ACC: [id number] EXAM:  XR CHEST PORTABLE IMMED 1V   ORDERED BY: MARK CRYSTAL     PROCEDURE DATE:  07/15/2024          INTERPRETATION:  Sepsis.    AP chest. Prior 7/6/2024    Cardiac silhouette and pulmonary vascularity exaggerated by AP film   shallow inspiration. Elevation right hemidiaphragm similar to prior.   Grossly clear lungs.    IMPRESSION: Grossly clear lungs        ACC: [id number] EXAM:  CT ABDOMEN AND PELVIS IC   ORDERED BY: MAKAYLA DAVID     ACC: [id number] EXAM:  CT ANGIO CHEST PULM ART WAWIC   ORDERED BY: MAKAYLA DAVID     PROCEDURE DATE:  07/15/2024          INTERPRETATION:  CTA CHEST AND CT ABDOMEN AND PELVIS    INDICATION: Chest pain, hypoxia, fever, lower back pain    TECHNIQUE: Helical images of the chest, abdomen, and pelvis were obtained   before and after the administration of 90 mL of Omnipaque 350. Maximum   intensity projection images were generated.    COMPARISON: None.    FINDINGS:    CT CHEST:    HEART/VASCULATURE: Normal sized heart and aorta. No pericardial effusion.   No pulmonary embolus.    LUNGS/AIRWAYS/PLEURA: No endobronchial lesion. Low lung volumes. Clear   lungs. No pleural abnormality. Elevated right hemidiaphragm.    LYMPH NODES/MEDIASTINUM: No lymphadenopathy.    BONES/SOFT TISSUES: Unremarkable.        CT ABDOMEN/PELVIS:    LIVER: Unremarkable.    BILIARY SYSTEM: Unremarkable.    SPLEEN:Unremarkable.    PANCREAS: Unremarkable.    ADRENALS: Unremarkable.    KIDNEYS/URINARY TRACT: Unremarkable.    GASTROINTESTINAL TRACT: Unremarkable.    REPRODUCTIVE ORGANS: Unremarkable uterus. Bilateral adnexal   calcifications.    LYMPH NODES/PERITONEUM: Unremarkable.    VASCULATURE: Normal aortic size. Patent portal veins.    BONES/SOFT TISSUES: No bone lesion. Mild nonspecific stranding of the   subcutaneous fat in the right abdominal wall.      IMPRESSION:    No pulmonary embolus.    No pneumonia.    Nonspecific mild stranding of the subcutaneous fat in the right anterior   abdominal wall. Correlate for cellulitis.   CC: acute hypoxic resp failure due to  COVID-19  intractable back pain    HPI: 53 year old female w hx breast CA s/p lumpectomy, RT on tamoxifen,, DM II, HTN came to ED in wheelchair c/o LBP  pain is  left lower back radiates down into left posterior thigh, started Saturday   Has been unable to walk due to pain  + cough x 2 days.  Seen in super track, found to be hypoxic to 87% on room air and brought to the main  denies recent travel or sick contacts    In ED /72      RR 20   T 99.6   89% sat RA  2 L nc  CXR no acute disease; CTA no [place], no PNA  solumedrol 40 mg then decadron 6 mg    She is being admitted for acute hypoxic resp failure due to COVID-19, intractable LBP L          INTERVAL HPI/OVERNIGHT EVENTS: chart reviewed, Pt was seen and examined, reports pain is better whe at rest but has severe pain, likel electric shock if trying to move in the bed.  Denies Numbness or leg weakness. Reports breathing improved, has some cough . POC discussed     Vital Signs Last 24 Hrs  T(C): 36.7 (16 Jul 2024 07:49), Max: 37.6 (15 Jul 2024 18:23)  T(F): 98.1 (16 Jul 2024 07:49), Max: 99.7 (15 Jul 2024 18:23)  HR: 114 (16 Jul 2024 07:49) (101 - 114)  BP: 110/76 (16 Jul 2024 07:49) (105/91 - 197/85)  BP(mean): 104 (15 Jul 2024 20:20) (98 - 104)  RR: 18 (16 Jul 2024 07:49) (17 - 22)  SpO2: 95% (16 Jul 2024 07:49) (92% - 99%)    Parameters below as of 16 Jul 2024 07:49  Patient On (Oxygen Delivery Method): nasal cannula  O2 Flow (L/min): 2        REVIEW OF SYSTEMS:  All other review of systems is negative unless indicated above.        PHYSICAL EXAM:  General: in no acute distress  Eyes: , EOMI; conjunctiva and sclera clear  Head: Normocephalic; atraumatic  ENMT: No nasal discharge; airway clear  Respiratory: decreased BS,  No wheezes  Cardiovascular: Regular rate and rhythm. S1 and S2 Normal; No murmur  Gastrointestinal: Soft non-tender non-distended; Normal bowel sounds  Genitourinary: No  suprapubic  tenderness  Extremities: No r edema  Neurological: Alert and oriented x 3, non focal   Skin: Warm and dry. No acute rash  Musculoskeletal: Normal muscle tone, L upper thight/L hip tender with palpation, limited MARIUSZ       LABS:   CARDIAC MARKERS ( 15 Jul 2024 19:42 )  x     / x     / 45 U/L / x     / x                                16.8   13.46 )-----------( 190      ( 15 Jul 2024 12:07 )             51.5     16 Jul 2024 07:59    x      |  x      |  x      ----------------------------<  x      x       |  x      |  0.69     Ca    9.1        15 Jul 2024 12:07  Phos  3.6       16 Jul 2024 07:59  Mg     2.0       16 Jul 2024 07:59    TPro  7.2    /  Alb  2.9    /  TBili  0.5    /  DBili  0.1    /  AST  15     /  ALT  21     /  AlkPhos  75     16 Jul 2024 07:59    PT/INR - ( 16 Jul 2024 07:59 )   PT: 11.2 sec;   INR: 0.99 ratio         PTT - ( 15 Jul 2024 12:07 )  PTT:26.9 sec  CAPILLARY BLOOD GLUCOSE      POCT Blood Glucose.: 273 mg/dL (16 Jul 2024 13:16)  POCT Blood Glucose.: 174 mg/dL (16 Jul 2024 08:27)  POCT Blood Glucose.: 261 mg/dL (15 Jul 2024 23:35)  POCT Blood Glucose.: 223 mg/dL (15 Jul 2024 18:57)    LIVER FUNCTIONS - ( 16 Jul 2024 07:59 )  Alb: 2.9 g/dL / Pro: 7.2 gm/dL / ALK PHOS: 75 U/L / ALT: 21 U/L / AST: 15 U/L / GGT: x           Urinalysis Basic - ( 15 Jul 2024 13:53 )    Color: Yellow / Appearance: Clear / SG: >1.030 / pH: x  Gluc: x / Ketone: Trace mg/dL  / Bili: Negative / Urobili: 0.2 mg/dL   Blood: x / Protein: Negative mg/dL / Nitrite: Negative   Leuk Esterase: Negative / RBC: x / WBC x   Sq Epi: x / Non Sq Epi: x / Bacteria: x        MEDICATIONS  (STANDING):  amLODIPine   Tablet 10 milliGRAM(s) Oral daily  aspirin enteric coated 81 milliGRAM(s) Oral daily  chlorhexidine 4% Liquid 1 Application(s) Topical <User Schedule>  dexAMETHasone  Injectable 6 milliGRAM(s) IV Push daily  dextrose 5%. 1000 milliLiter(s) (100 mL/Hr) IV Continuous <Continuous>  dextrose 5%. 1000 milliLiter(s) (50 mL/Hr) IV Continuous <Continuous>  dextrose 50% Injectable 25 Gram(s) IV Push once  dextrose 50% Injectable 25 Gram(s) IV Push once  dextrose 50% Injectable 12.5 Gram(s) IV Push once  enoxaparin Injectable 110 milliGRAM(s) SubCutaneous every 12 hours  furosemide    Tablet 20 milliGRAM(s) Oral daily  glucagon  Injectable 1 milliGRAM(s) IntraMuscular once  insulin lispro (ADMELOG) corrective regimen sliding scale   SubCutaneous three times a day before meals  insulin lispro (ADMELOG) corrective regimen sliding scale   SubCutaneous at bedtime  losartan 100 milliGRAM(s) Oral daily  remdesivir  IVPB 100 milliGRAM(s) IV Intermittent every 24 hours  remdesivir  IVPB   IV Intermittent   tamoxifen 20 milliGRAM(s) Oral daily    MEDICATIONS  (PRN):  acetaminophen     Tablet .. 650 milliGRAM(s) Oral every 4 hours PRN Temp greater or equal to 38.5C (101.3F)  cyclobenzaprine 10 milliGRAM(s) Oral three times a day PRN Muscle Spasm  dextrose Oral Gel 15 Gram(s) Oral once PRN Blood Glucose LESS THAN 70 milliGRAM(s)/deciliter  ketorolac   Injectable 15 milliGRAM(s) IV Push every 6 hours PRN Moderate Pain (4 - 6)  meclizine 12.5 milliGRAM(s) Oral three times a day PRN for dizziness      RADIOLOGY & ADDITIONAL TESTS:      ACC: [id number] EXAM:  XR CHEST PORTABLE IMMED 1V   ORDERED BY: MARK CRYSTAL     PROCEDURE DATE:  07/15/2024          INTERPRETATION:  Sepsis.    AP chest. Prior 7/6/2024    Cardiac silhouette and pulmonary vascularity exaggerated by AP film   shallow inspiration. Elevation right hemidiaphragm similar to prior.   Grossly clear lungs.    IMPRESSION: Grossly clear lungs        ACC: [id number] EXAM:  CT ABDOMEN AND PELVIS IC   ORDERED BY: MAKAYLA DAVID     ACC: [id number] EXAM:  CT ANGIO CHEST PULM ART WAWIC   ORDERED BY: MAKAYLA DAVID     PROCEDURE DATE:  07/15/2024          INTERPRETATION:  CTA CHEST AND CT ABDOMEN AND PELVIS    INDICATION: Chest pain, hypoxia, fever, lower back pain    TECHNIQUE: Helical images of the chest, abdomen, and pelvis were obtained   before and after the administration of 90 mL of Omnipaque 350. Maximum   intensity projection images were generated.    COMPARISON: None.    FINDINGS:    CT CHEST:    HEART/VASCULATURE: Normal sized heart and aorta. No pericardial effusion.   No pulmonary embolus.    LUNGS/AIRWAYS/PLEURA: No endobronchial lesion. Low lung volumes. Clear   lungs. No pleural abnormality. Elevated right hemidiaphragm.    LYMPH NODES/MEDIASTINUM: No lymphadenopathy.    BONES/SOFT TISSUES: Unremarkable.        CT ABDOMEN/PELVIS:    LIVER: Unremarkable.    BILIARY SYSTEM: Unremarkable.    SPLEEN:Unremarkable.    PANCREAS: Unremarkable.    ADRENALS: Unremarkable.    KIDNEYS/URINARY TRACT: Unremarkable.    GASTROINTESTINAL TRACT: Unremarkable.    REPRODUCTIVE ORGANS: Unremarkable uterus. Bilateral adnexal   calcifications.    LYMPH NODES/PERITONEUM: Unremarkable.    VASCULATURE: Normal aortic size. Patent portal veins.    BONES/SOFT TISSUES: No bone lesion. Mild nonspecific stranding of the   subcutaneous fat in the right abdominal wall.      IMPRESSION:    No pulmonary embolus.    No pneumonia.    Nonspecific mild stranding of the subcutaneous fat in the right anterior   abdominal wall. Correlate for cellulitis.

## 2024-07-17 LAB
ALBUMIN SERPL ELPH-MCNC: 2.8 G/DL — LOW (ref 3.3–5)
ALP SERPL-CCNC: 71 U/L — SIGNIFICANT CHANGE UP (ref 40–120)
ALT FLD-CCNC: 20 U/L — SIGNIFICANT CHANGE UP (ref 12–78)
ANION GAP SERPL CALC-SCNC: 9 MMOL/L — SIGNIFICANT CHANGE UP (ref 5–17)
AST SERPL-CCNC: 12 U/L — LOW (ref 15–37)
BILIRUB DIRECT SERPL-MCNC: <0.1 MG/DL — SIGNIFICANT CHANGE UP (ref 0–0.3)
BILIRUB INDIRECT FLD-MCNC: >0.4 MG/DL — SIGNIFICANT CHANGE UP (ref 0.2–1)
BILIRUB SERPL-MCNC: 0.5 MG/DL — SIGNIFICANT CHANGE UP (ref 0.2–1.2)
BUN SERPL-MCNC: 28 MG/DL — HIGH (ref 7–23)
CALCIUM SERPL-MCNC: 9.1 MG/DL — SIGNIFICANT CHANGE UP (ref 8.5–10.1)
CHLORIDE SERPL-SCNC: 107 MMOL/L — SIGNIFICANT CHANGE UP (ref 96–108)
CO2 SERPL-SCNC: 24 MMOL/L — SIGNIFICANT CHANGE UP (ref 22–31)
CREAT SERPL-MCNC: 0.82 MG/DL — SIGNIFICANT CHANGE UP (ref 0.5–1.3)
CRP SERPL-MCNC: 92 MG/L — HIGH
D DIMER BLD IA.RAPID-MCNC: <150 NG/ML DDU — SIGNIFICANT CHANGE UP
EGFR: 85 ML/MIN/1.73M2 — SIGNIFICANT CHANGE UP
FERRITIN SERPL-MCNC: 322 NG/ML — SIGNIFICANT CHANGE UP (ref 13–330)
GLUCOSE BLDC GLUCOMTR-MCNC: 165 MG/DL — HIGH (ref 70–99)
GLUCOSE BLDC GLUCOMTR-MCNC: 268 MG/DL — HIGH (ref 70–99)
GLUCOSE BLDC GLUCOMTR-MCNC: 274 MG/DL — HIGH (ref 70–99)
GLUCOSE BLDC GLUCOMTR-MCNC: 339 MG/DL — HIGH (ref 70–99)
GLUCOSE SERPL-MCNC: 232 MG/DL — HIGH (ref 70–99)
HCT VFR BLD CALC: 50 % — HIGH (ref 34.5–45)
HGB BLD-MCNC: 16.3 G/DL — HIGH (ref 11.5–15.5)
INR BLD: 1.01 RATIO — SIGNIFICANT CHANGE UP (ref 0.85–1.18)
MCHC RBC-ENTMCNC: 31.2 PG — SIGNIFICANT CHANGE UP (ref 27–34)
MCHC RBC-ENTMCNC: 32.6 GM/DL — SIGNIFICANT CHANGE UP (ref 32–36)
MCV RBC AUTO: 95.6 FL — SIGNIFICANT CHANGE UP (ref 80–100)
PLATELET # BLD AUTO: 242 K/UL — SIGNIFICANT CHANGE UP (ref 150–400)
POTASSIUM SERPL-MCNC: 4.2 MMOL/L — SIGNIFICANT CHANGE UP (ref 3.5–5.3)
POTASSIUM SERPL-SCNC: 4.2 MMOL/L — SIGNIFICANT CHANGE UP (ref 3.5–5.3)
PROCALCITONIN SERPL-MCNC: 0.06 NG/ML — SIGNIFICANT CHANGE UP (ref 0.02–0.1)
PROT SERPL-MCNC: 7 GM/DL — SIGNIFICANT CHANGE UP (ref 6–8.3)
PROTHROM AB SERPL-ACNC: 11.4 SEC — SIGNIFICANT CHANGE UP (ref 9.5–13)
RBC # BLD: 5.23 M/UL — HIGH (ref 3.8–5.2)
RBC # FLD: 14.8 % — HIGH (ref 10.3–14.5)
SODIUM SERPL-SCNC: 140 MMOL/L — SIGNIFICANT CHANGE UP (ref 135–145)
WBC # BLD: 17.18 K/UL — HIGH (ref 3.8–10.5)
WBC # FLD AUTO: 17.18 K/UL — HIGH (ref 3.8–10.5)

## 2024-07-17 PROCEDURE — 99233 SBSQ HOSP IP/OBS HIGH 50: CPT

## 2024-07-17 PROCEDURE — 72148 MRI LUMBAR SPINE W/O DYE: CPT | Mod: 26

## 2024-07-17 RX ORDER — INSULIN GLARGINE 100 [IU]/ML
10 INJECTION, SOLUTION SUBCUTANEOUS EVERY MORNING
Refills: 0 | Status: DISCONTINUED | OUTPATIENT
Start: 2024-07-18 | End: 2024-07-18

## 2024-07-17 RX ADMIN — Medication 1 APPLICATION(S): at 08:47

## 2024-07-17 RX ADMIN — INSULIN LISPRO 2: 100 INJECTION, SOLUTION SUBCUTANEOUS at 22:44

## 2024-07-17 RX ADMIN — OXYCODONE HYDROCHLORIDE 5 MILLIGRAM(S): 100 SOLUTION ORAL at 10:14

## 2024-07-17 RX ADMIN — OXYCODONE HYDROCHLORIDE 5 MILLIGRAM(S): 100 SOLUTION ORAL at 10:58

## 2024-07-17 RX ADMIN — REMDESIVIR 200 MILLIGRAM(S): 5 INJECTION INTRAVENOUS at 22:44

## 2024-07-17 RX ADMIN — TAMOXIFEN CITRATE 20 MILLIGRAM(S): 10 TABLET, FILM COATED ORAL at 08:47

## 2024-07-17 RX ADMIN — INSULIN LISPRO 2: 100 INJECTION, SOLUTION SUBCUTANEOUS at 08:45

## 2024-07-17 RX ADMIN — INSULIN LISPRO 6: 100 INJECTION, SOLUTION SUBCUTANEOUS at 12:15

## 2024-07-17 RX ADMIN — KETOROLAC TROMETHAMINE 15 MILLIGRAM(S): 30 INJECTION, SOLUTION INTRAMUSCULAR at 09:40

## 2024-07-17 RX ADMIN — DEXAMETHASONE 6 MILLIGRAM(S): 1 TABLET ORAL at 08:48

## 2024-07-17 RX ADMIN — KETOROLAC TROMETHAMINE 15 MILLIGRAM(S): 30 INJECTION, SOLUTION INTRAMUSCULAR at 08:45

## 2024-07-17 RX ADMIN — AMLODIPINE BESYLATE 10 MILLIGRAM(S): 2.5 TABLET ORAL at 08:46

## 2024-07-17 RX ADMIN — INSULIN LISPRO 8: 100 INJECTION, SOLUTION SUBCUTANEOUS at 16:47

## 2024-07-17 RX ADMIN — LOSARTAN POTASSIUM 100 MILLIGRAM(S): 100 TABLET, FILM COATED ORAL at 08:46

## 2024-07-17 RX ADMIN — ENOXAPARIN SODIUM 110 MILLIGRAM(S): 100 INJECTION SUBCUTANEOUS at 22:44

## 2024-07-17 RX ADMIN — FUROSEMIDE 20 MILLIGRAM(S): 10 INJECTION, SOLUTION INTRAMUSCULAR; INTRAVENOUS at 08:46

## 2024-07-17 RX ADMIN — Medication 10 MILLIGRAM(S): at 16:46

## 2024-07-17 RX ADMIN — ENOXAPARIN SODIUM 110 MILLIGRAM(S): 100 INJECTION SUBCUTANEOUS at 08:47

## 2024-07-17 RX ADMIN — KETOROLAC TROMETHAMINE 15 MILLIGRAM(S): 30 INJECTION, SOLUTION INTRAMUSCULAR at 17:30

## 2024-07-17 RX ADMIN — ASPIRIN 81 MILLIGRAM(S): 325 TABLET, FILM COATED ORAL at 08:46

## 2024-07-17 RX ADMIN — KETOROLAC TROMETHAMINE 15 MILLIGRAM(S): 30 INJECTION, SOLUTION INTRAMUSCULAR at 16:47

## 2024-07-17 NOTE — CONSULT NOTE ADULT - ASSESSMENT
53 year old [place] female w hx breast CA s/p lumpectomy, RT on tamoxifen,, DM II, HTN came to ED in wheelchair c/o LBP    Pain localized in left lower back radiates down into left posterior thigh, started Saturday(7/13/24)   Has been unable to walk due to pain    Patient with hx similar BP 2 yrs ago-spontaneous resolution    Admits to (+) cough x 2 days.  Seen in super track, found to be hypoxic to 87% on room air and brought to the main ER  (+) Covid-denies recent travel or sick contacts    In ED /72      RR 20   T 99.6   89% sat RA  2 L nc  CXR no acute disease; CTA no [place], no PNA  solumedrol 40 mg then decadron 6 mg daily    She admitted for acute hypoxic resp failure due to COVID-19, intractable LBP    IMP:  Intractable BP  HNP L1/2 with DDD/stenosis  (+) Covid  resolving hypoxia    PLAN:  Patient admitted to medicine  Decadron 10 mg daily ordered  Medical management  Patient candidate for MRI lumbar spine  OOB as tolerated   53 year old [place] female w hx breast CA s/p lumpectomy, RT on tamoxifen,, DM II, HTN came to ED in wheelchair c/o LBP    Pain localized in left lower back radiates down into left posterior thigh, started Saturday(7/13/24)   Has been unable to walk due to pain    Patient with hx similar BP 2 yrs ago-spontaneous resolution    Admits to (+) cough x 2 days.  Seen in super track, found to be hypoxic to 87% on room air and brought to the main ER  (+) Covid-denies recent travel or sick contacts    In ED /72      RR 20   T 99.6   89% sat RA  2 L nc  CXR no acute disease; CTA no [place], no PNA  solumedrol 40 mg then decadron 6 mg daily    She admitted for acute hypoxic resp failure due to COVID-19, intractable LBP    IMP:  Intractable BP  HNP L1/2 with DDD/stenosis  (+) Covid  resolving hypoxia    PLAN:  Patient admitted to medicine  Decadron 10 mg daily ordered  Medical management  Patient candidate for MRI lumbar spine  OOB/PT as tolerated   53 year old [place] female w hx breast CA s/p lumpectomy, RT on tamoxifen,, DM II, HTN came to ED in wheelchair c/o LBP    Pain localized in left lower back radiates down into left posterior thigh, started Saturday(7/13/24)   Has been unable to walk due to pain    Patient with hx similar BP 2 yrs ago-spontaneous resolution    Admits to (+) cough x 2 days.  Seen in super track, found to be hypoxic to 87% on room air and brought to the main ER  (+) Covid-denies recent travel or sick contacts    In ED /72      RR 20   T 99.6   89% sat RA  2 L nc  CXR no acute disease; CTA no [place], no PNA  solumedrol 40 mg then decadron 6 mg daily    She admitted for acute hypoxic resp failure due to COVID-19, intractable LBP    IMP:  Intractable BP  HNP L1/2 with DDD/stenosis  (+) Covid  resolving hypoxia    PLAN:  Patient admitted to medicine  Decadron 6 mg IVP daily ordered  Medical management  Patient candidate for MRI lumbar spine  OOB/PT as tolerated

## 2024-07-17 NOTE — PROGRESS NOTE ADULT - ASSESSMENT
53 year old female w hx breast CA s/p lumpectomy, RT on tamoxifen,, DM II, HTN came to ED in wheelchair c/o LBP  Hypoxic in ED        #Acute hypoxic resp failure  #COVID-19 detected  CTA no PE no PNA  Resp and contact for COVID-19  supplemental O2  S/p solumedrol 40 mg in ED  c/w  decadron 6 mg IV QD  C/w  remdesivir  inflammatory markers elevated       #Intractable LBP w sciatica on L  has  partial relief w meds  C/w  ketorolac 15 mg Oq 6 hrs prn mod pain and  flexeril 10 mg TID prn spasm  C/w  Oxy PRN  On Decadrone IV  Imaging reviewed no abd bone findings  D/w  spinal Sx  PA, MRI ordered         #Breast CA hx   continue tamoxifen      #DM II  hold oral agents   diet  BGM  ISS    #HTN  continue  amlodipine and losartan       #Obesity  is on mounjaro  hold  while in the hospital       #VTE  lovenox q 12 for COVID/obesity

## 2024-07-17 NOTE — CONSULT NOTE ADULT - SUBJECTIVE AND OBJECTIVE BOX
HPI: hx obtained thru chart review/discussion with Dr. Hedrick/[facility] interpretter #[id number]    53 year old Hisp female w hx breast CA s/p lumpectomy, RT on tamoxifen,, DM II, HTN came to ED in wheelchair c/o LBP    Pain localized in left lower back radiates down into left posterior thigh, started Saturday(7/13/24)   Has been unable to walk due to pain    Patient with hx similar BP 2 yrs ago-spontaneous resolution    Admits to (+) cough x 2 days.  Seen in super track, found to be hypoxic to 87% on room air and brought to the main ER  (+) Covid-denies recent travel or sick contacts    In ED /72      RR 20   T 99.6   89% sat RA  2 L nc  CXR no acute disease; CTA no OR, no PNA  solumedrol 40 mg then decadron 6 mg daily    She admitted for acute hypoxic resp failure due to COVID-19, intractable LBP    Today patient states patient somewhat better with ability to ambulate with walker    PAST MEDICAL HX  Bladder prolapse, female, acquired   Essential hypertension  Infiltrating ductal carcinoma of right breast 1.2 cm mass ER+  Malignant neoplasm of right female breast, unspecified estrogen receptor status, unspecified site of breast on tamoxifen  RT radiation therapy completed   Leukocytosis  Neoplasm of uncertain behavior of skin   Primary osteoarthritis of both knees   Type 2 diabetes mellitus without complication, without long-term current use of insulin   Uterine leiomyoma, unspecified location 2011.     PAST SURGICAL HX  R breast lumpectomy w R sentinel node bx   H/O prior ablation treatment 2011  H/O tubal ligation 1997, bilateral  Status post fine needle biopsy 7/16/18.     Home Medications:  amLODIPine 10 mg oral tablet: 1 tab(s) orally once a day (15 Jul 2024 18:04)  aspirin 81 mg oral delayed release tablet: 1 tab(s) orally once a day (in the evening) (15 Jul 2024 18:03)  cloNIDine 0.1 mg oral tablet: 1 tab(s) orally once a day (in the morning) ***prescribed to take 1 tab 2x a day but pt only takes 1 tab a day in the morning*** (15 Jul 2024 18:07)  Flonase Allergy Relief 50 mcg/inh nasal spray: 1 spray(s) in each nostril once a day ***pt did not start using because &quot;it was supposed to be for the ears, not the nose&quot;*** (15 Jul 2024 18:04)  furosemide 20 mg oral tablet: 1 tab(s) orally once a day (in the morning) ***prescribed to take 1 tab 2x a day but pt only takes 1 tab a day in the morning*** (15 Jul 2024 18:07)  glipiZIDE 5 mg oral tablet: 1 tab(s) orally once a day (15 Jul 2024 18:07)  Jardiance 25 mg oral tablet: 1 tab(s) orally once a day (15 Jul 2024 18:07)  losartan 100 mg oral tablet: 1 tab(s) orally once a day (15 Jul 2024 18:07)  Ozempic 4 mg/3 mL (1 mg dose) subcutaneous solution: 1 milligram(s) subcutaneously once a week on Fridays (15 Jul 2024 18:07)  tamoxifen 20 mg oral tablet: 1 tab(s) orally once a day (in the evening) (15 Jul 2024 18:05)    MEDICATIONS  (STANDING):  amLODIPine   Tablet 10 milliGRAM(s) Oral daily  aspirin enteric coated 81 milliGRAM(s) Oral daily  chlorhexidine 4% Liquid 1 Application(s) Topical <User Schedule>  dexAMETHasone  Injectable 6 milliGRAM(s) IV Push daily  dextrose 5%. 1000 milliLiter(s) (100 mL/Hr) IV Continuous <Continuous>  dextrose 5%. 1000 milliLiter(s) (50 mL/Hr) IV Continuous <Continuous>  dextrose 50% Injectable 25 Gram(s) IV Push once  dextrose 50% Injectable 25 Gram(s) IV Push once  dextrose 50% Injectable 12.5 Gram(s) IV Push once  enoxaparin Injectable 110 milliGRAM(s) SubCutaneous every 12 hours  furosemide    Tablet 20 milliGRAM(s) Oral daily  glucagon  Injectable 1 milliGRAM(s) IntraMuscular once  insulin lispro (ADMELOG) corrective regimen sliding scale   SubCutaneous three times a day before meals  insulin lispro (ADMELOG) corrective regimen sliding scale   SubCutaneous at bedtime  losartan 100 milliGRAM(s) Oral daily  remdesivir  IVPB 100 milliGRAM(s) IV Intermittent every 24 hours  remdesivir  IVPB   IV Intermittent   tamoxifen 20 milliGRAM(s) Oral daily    MEDICATIONS  (PRN):  acetaminophen     Tablet .. 650 milliGRAM(s) Oral every 4 hours PRN Temp greater or equal to 38.5C (101.3F)  cyclobenzaprine 10 milliGRAM(s) Oral three times a day PRN Muscle Spasm  dextrose Oral Gel 15 Gram(s) Oral once PRN Blood Glucose LESS THAN 70 milliGRAM(s)/deciliter  ketorolac   Injectable 15 milliGRAM(s) IV Push every 6 hours PRN Moderate Pain (4 - 6)  meclizine 12.5 milliGRAM(s) Oral three times a day PRN for dizziness  oxyCODONE    IR 5 milliGRAM(s) Oral every 6 hours PRN Severe Pain (7 - 10)    FAMILY HX  Father  Still living? No  Family history of stroke, Age at diagnosis: Age Unknown    Mother  Still living? No  Family history of diabetes mellitus, Age at diagnosis: Age Unknown  Family history of hypertension, Age at diagnosis: Age Unknown  Family history of kidney disease, Age at diagnosis: Age Unknown.    SOCIAL HX  works as a   former smoker quit 2021; smoked 2-3 cigarettes/day x 7 years  no alcohol  no drugs (15 Jul 2024 17:47)    ROS c/w HPI    PE:    Vital Signs Last 24 Hrs  T(C): 37.1 (17 Jul 2024 15:43), Max: 37.1 (17 Jul 2024 15:43)  T(F): 98.8 (17 Jul 2024 15:43), Max: 98.8 (17 Jul 2024 15:43)  HR: 99 (17 Jul 2024 15:43) (92 - 105)  BP: 106/54 (17 Jul 2024 15:43) (106/54 - 149/93)  BP(mean): --  RR: 18 (17 Jul 2024 15:43) (18 - 18)  SpO2: 93% (17 Jul 2024 15:43) (91% - 97%)    Parameters below as of 17 Jul 2024 15:43  Patient On (Oxygen Delivery Method): nasal cannula  O2 Flow (L/min): 2    Patient sitting upright in bed with HOB at 40 degrees c/o L sided LBP and buttocks pain  HEENT unremarkable-nasal O2 in place  Neck supple, no tenderness, no weakness UEs, no adenopathy, thyroid nonpalpable, no bruits  Heart with RRR  Lungs with crackles  Abdomen-obese, soft, NT, No rebound or guarding  Extr: FROM, no joint tenderness, no E/C/C  T-L spine-tender L lower lumbar spine, mild (+) L SLR/negative R SLR, no focal weakness LEs    STUDIES  CT C/A/P-  Unreported calcified HNP with stenosis L1/2 with DDD/vacuum phenomenon, mild stenosis L3/4 & L4/5

## 2024-07-17 NOTE — PROGRESS NOTE ADULT - SUBJECTIVE AND OBJECTIVE BOX
CC: acute hypoxic resp failure due to  COVID-19  intractable back pain    HPI: 53 year old female w hx breast CA s/p lumpectomy, RT on tamoxifen,, DM II, HTN came to ED in wheelchair c/o LBP  pain is  left lower back radiates down into left posterior thigh, started Saturday   Has been unable to walk due to pain  + cough x 2 days.  Seen in super track, found to be hypoxic to 87% on room air and brought to the main  denies recent travel or sick contacts    In ED /72      RR 20   T 99.6   89% sat RA  2 L nc  CXR no acute disease; CTA no NC, no PNA  solumedrol 40 mg then decadron 6 mg    She is being admitted for acute hypoxic resp failure due to COVID-19, intractable LBP L          INTERVAL HPI/OVERNIGHT EVENTS:  Pt was seen and examined, reportsfeels better, pain improved, was able to sit up in bed and walk a little but had al ot of pain. No SOB but on NC Plan discussed       Vital Signs Last 24 Hrs  T(C): 37.1 (17 Jul 2024 15:43), Max: 37.1 (17 Jul 2024 15:43)  T(F): 98.8 (17 Jul 2024 15:43), Max: 98.8 (17 Jul 2024 15:43)  HR: 99 (17 Jul 2024 15:43) (92 - 105)  BP: 106/54 (17 Jul 2024 15:43) (106/54 - 149/93)  BP(mean): --  RR: 18 (17 Jul 2024 15:43) (18 - 18)  SpO2: 93% (17 Jul 2024 15:43) (91% - 97%)    Parameters below as of 17 Jul 2024 15:43  Patient On (Oxygen Delivery Method): nasal cannula  O2 Flow (L/min): 2      REVIEW OF SYSTEMS:  All other review of systems is negative unless indicated above.        PHYSICAL EXAM:  General: in no acute distress  Eyes: , EOMI; conjunctiva and sclera clear  Head: Normocephalic; atraumatic  ENMT: No nasal discharge; airway clear  Respiratory: decreased BS,  No wheezes  Cardiovascular: Regular rate and rhythm. S1 and S2 Normal; No murmur  Gastrointestinal: Soft non-tender non-distended; Normal bowel sounds  Genitourinary: No  suprapubic  tenderness  Extremities: No r edema  Neurological: Alert and oriented x 3, non focal   Skin: Warm and dry. No acute rash  Musculoskeletal: Normal muscle tone, L upper thight/L hip tender with palpation, limited MARIUSZ       LABS:                           16.3   17.18 )-----------( 242      ( 17 Jul 2024 09:18 )             50.0     07-17    140  |  107  |  28<H>  ----------------------------<  232<H>  4.2   |  24  |  0.82    Ca    9.1      17 Jul 2024 09:18  Phos  3.6     07-16  Mg     2.0     07-16    TPro  7.0  /  Alb  2.8<L>  /  TBili  0.5  /  DBili  <0.1  /  AST  12<L>  /  ALT  20  /  AlkPhos  71  07-17        LIVER FUNCTIONS - ( 17 Jul 2024 09:18 )  Alb: 2.8 g/dL / Pro: 7.0 gm/dL / ALK PHOS: 71 U/L / ALT: 20 U/L / AST: 12 U/L / GGT: x           PT/INR - ( 17 Jul 2024 09:18 )   PT: 11.4 sec;   INR: 1.01 ratio           Urinalysis Basic - ( 17 Jul 2024 09:18 )    Color: x / Appearance: x / SG: x / pH: x  Gluc: 232 mg/dL / Ketone: x  / Bili: x / Urobili: x   Blood: x / Protein: x / Nitrite: x   Leuk Esterase: x / RBC: x / WBC x   Sq Epi: x / Non Sq Epi: x / Bacteria: x          CARDIAC MARKERS ( 15 Jul 2024 19:42 )  x     / x     / 45 U/L / x     / x                                16.8   13.46 )-----------( 190      ( 15 Jul 2024 12:07 )             51.5     16 Jul 2024 07:59    x      |  x      |  x      ----------------------------<  x      x       |  x      |  0.69     Ca    9.1        15 Jul 2024 12:07  Phos  3.6       16 Jul 2024 07:59  Mg     2.0       16 Jul 2024 07:59    TPro  7.2    /  Alb  2.9    /  TBili  0.5    /  DBili  0.1    /  AST  15     /  ALT  21     /  AlkPhos  75     16 Jul 2024 07:59    PT/INR - ( 16 Jul 2024 07:59 )   PT: 11.2 sec;   INR: 0.99 ratio         PTT - ( 15 Jul 2024 12:07 )  PTT:26.9 sec      LIVER FUNCTIONS - ( 16 Jul 2024 07:59 )  Alb: 2.9 g/dL / Pro: 7.2 gm/dL / ALK PHOS: 75 U/L / ALT: 21 U/L / AST: 15 U/L / GGT: x           Urinalysis Basic - ( 15 Jul 2024 13:53 )    Color: Yellow / Appearance: Clear / SG: >1.030 / pH: x  Gluc: x / Ketone: Trace mg/dL  / Bili: Negative / Urobili: 0.2 mg/dL   Blood: x / Protein: Negative mg/dL / Nitrite: Negative   Leuk Esterase: Negative / RBC: x / WBC x   Sq Epi: x / Non Sq Epi: x / Bacteria: x        MEDICATIONS  (STANDING):  amLODIPine   Tablet 10 milliGRAM(s) Oral daily  aspirin enteric coated 81 milliGRAM(s) Oral daily  chlorhexidine 4% Liquid 1 Application(s) Topical <User Schedule>  dexAMETHasone  Injectable 6 milliGRAM(s) IV Push daily  dextrose 5%. 1000 milliLiter(s) (100 mL/Hr) IV Continuous <Continuous>  dextrose 5%. 1000 milliLiter(s) (50 mL/Hr) IV Continuous <Continuous>  dextrose 50% Injectable 25 Gram(s) IV Push once  dextrose 50% Injectable 25 Gram(s) IV Push once  dextrose 50% Injectable 12.5 Gram(s) IV Push once  enoxaparin Injectable 110 milliGRAM(s) SubCutaneous every 12 hours  furosemide    Tablet 20 milliGRAM(s) Oral daily  glucagon  Injectable 1 milliGRAM(s) IntraMuscular once  insulin lispro (ADMELOG) corrective regimen sliding scale   SubCutaneous three times a day before meals  insulin lispro (ADMELOG) corrective regimen sliding scale   SubCutaneous at bedtime  losartan 100 milliGRAM(s) Oral daily  remdesivir  IVPB 100 milliGRAM(s) IV Intermittent every 24 hours  remdesivir  IVPB   IV Intermittent   tamoxifen 20 milliGRAM(s) Oral daily    MEDICATIONS  (PRN):  acetaminophen     Tablet .. 650 milliGRAM(s) Oral every 4 hours PRN Temp greater or equal to 38.5C (101.3F)  cyclobenzaprine 10 milliGRAM(s) Oral three times a day PRN Muscle Spasm  dextrose Oral Gel 15 Gram(s) Oral once PRN Blood Glucose LESS THAN 70 milliGRAM(s)/deciliter  ketorolac   Injectable 15 milliGRAM(s) IV Push every 6 hours PRN Moderate Pain (4 - 6)  meclizine 12.5 milliGRAM(s) Oral three times a day PRN for dizziness      RADIOLOGY & ADDITIONAL TESTS:      ACC: [id number] EXAM:  XR CHEST PORTABLE IMMED 1V   ORDERED BY: MARK CRYSTAL     PROCEDURE DATE:  07/15/2024          INTERPRETATION:  Sepsis.    AP chest. Prior 7/6/2024    Cardiac silhouette and pulmonary vascularity exaggerated by AP film   shallow inspiration. Elevation right hemidiaphragm similar to prior.   Grossly clear lungs.    IMPRESSION: Grossly clear lungs        ACC: [id number] EXAM:  CT ABDOMEN AND PELVIS IC   ORDERED BY: MAKAYLA DAVID     ACC: [id number] EXAM:  CT ANGIO CHEST PULM ART WAWIC   ORDERED BY: MAKAYLA DAVID     PROCEDURE DATE:  07/15/2024          INTERPRETATION:  CTA CHEST AND CT ABDOMEN AND PELVIS    INDICATION: Chest pain, hypoxia, fever, lower back pain    TECHNIQUE: Helical images of the chest, abdomen, and pelvis were obtained   before and after the administration of 90 mL of Omnipaque 350. Maximum   intensity projection images were generated.    COMPARISON: None.    FINDINGS:    CT CHEST:    HEART/VASCULATURE: Normal sized heart and aorta. No pericardial effusion.   No pulmonary embolus.    LUNGS/AIRWAYS/PLEURA: No endobronchial lesion. Low lung volumes. Clear   lungs. No pleural abnormality. Elevated right hemidiaphragm.    LYMPH NODES/MEDIASTINUM: No lymphadenopathy.    BONES/SOFT TISSUES: Unremarkable.        CT ABDOMEN/PELVIS:    LIVER: Unremarkable.    BILIARY SYSTEM: Unremarkable.    SPLEEN:Unremarkable.    PANCREAS: Unremarkable.    ADRENALS: Unremarkable.    KIDNEYS/URINARY TRACT: Unremarkable.    GASTROINTESTINAL TRACT: Unremarkable.    REPRODUCTIVE ORGANS: Unremarkable uterus. Bilateral adnexal   calcifications.    LYMPH NODES/PERITONEUM: Unremarkable.    VASCULATURE: Normal aortic size. Patent portal veins.    BONES/SOFT TISSUES: No bone lesion. Mild nonspecific stranding of the   subcutaneous fat in the right abdominal wall.      IMPRESSION:    No pulmonary embolus.    No pneumonia.    Nonspecific mild stranding of the subcutaneous fat in the right anterior   abdominal wall. Correlate for cellulitis.

## 2024-07-18 LAB
ALBUMIN SERPL ELPH-MCNC: 2.8 G/DL — LOW (ref 3.3–5)
ALP SERPL-CCNC: 69 U/L — SIGNIFICANT CHANGE UP (ref 40–120)
ALT FLD-CCNC: 23 U/L — SIGNIFICANT CHANGE UP (ref 12–78)
AST SERPL-CCNC: 14 U/L — LOW (ref 15–37)
BILIRUB DIRECT SERPL-MCNC: <0.1 MG/DL — SIGNIFICANT CHANGE UP (ref 0–0.3)
BILIRUB INDIRECT FLD-MCNC: >0.4 MG/DL — SIGNIFICANT CHANGE UP (ref 0.2–1)
BILIRUB SERPL-MCNC: 0.5 MG/DL — SIGNIFICANT CHANGE UP (ref 0.2–1.2)
CREAT SERPL-MCNC: 1.02 MG/DL — SIGNIFICANT CHANGE UP (ref 0.5–1.3)
EGFR: 66 ML/MIN/1.73M2 — SIGNIFICANT CHANGE UP
GLUCOSE BLDC GLUCOMTR-MCNC: 167 MG/DL — HIGH (ref 70–99)
GLUCOSE BLDC GLUCOMTR-MCNC: 234 MG/DL — HIGH (ref 70–99)
GLUCOSE BLDC GLUCOMTR-MCNC: 344 MG/DL — HIGH (ref 70–99)
GLUCOSE BLDC GLUCOMTR-MCNC: 405 MG/DL — HIGH (ref 70–99)
INR BLD: 1.05 RATIO — SIGNIFICANT CHANGE UP (ref 0.85–1.18)
PROT SERPL-MCNC: 7.4 GM/DL — SIGNIFICANT CHANGE UP (ref 6–8.3)
PROTHROM AB SERPL-ACNC: 11.9 SEC — SIGNIFICANT CHANGE UP (ref 9.5–13)

## 2024-07-18 PROCEDURE — 99232 SBSQ HOSP IP/OBS MODERATE 35: CPT

## 2024-07-18 RX ORDER — INSULIN GLARGINE 100 [IU]/ML
18 INJECTION, SOLUTION SUBCUTANEOUS AT BEDTIME
Refills: 0 | Status: DISCONTINUED | OUTPATIENT
Start: 2024-07-18 | End: 2024-07-18

## 2024-07-18 RX ORDER — INSULIN GLARGINE 100 [IU]/ML
18 INJECTION, SOLUTION SUBCUTANEOUS EVERY MORNING
Refills: 0 | Status: DISCONTINUED | OUTPATIENT
Start: 2024-07-18 | End: 2024-07-20

## 2024-07-18 RX ORDER — INSULIN GLARGINE 100 [IU]/ML
10 INJECTION, SOLUTION SUBCUTANEOUS EVERY MORNING
Refills: 0 | Status: DISCONTINUED | OUTPATIENT
Start: 2024-07-18 | End: 2024-07-18

## 2024-07-18 RX ADMIN — ASPIRIN 81 MILLIGRAM(S): 325 TABLET, FILM COATED ORAL at 10:01

## 2024-07-18 RX ADMIN — INSULIN LISPRO 8: 100 INJECTION, SOLUTION SUBCUTANEOUS at 21:48

## 2024-07-18 RX ADMIN — DEXAMETHASONE 6 MILLIGRAM(S): 1 TABLET ORAL at 10:02

## 2024-07-18 RX ADMIN — REMDESIVIR 200 MILLIGRAM(S): 5 INJECTION INTRAVENOUS at 23:27

## 2024-07-18 RX ADMIN — LOSARTAN POTASSIUM 100 MILLIGRAM(S): 100 TABLET, FILM COATED ORAL at 10:01

## 2024-07-18 RX ADMIN — FUROSEMIDE 20 MILLIGRAM(S): 10 INJECTION, SOLUTION INTRAMUSCULAR; INTRAVENOUS at 10:01

## 2024-07-18 RX ADMIN — AMLODIPINE BESYLATE 10 MILLIGRAM(S): 2.5 TABLET ORAL at 10:01

## 2024-07-18 RX ADMIN — INSULIN GLARGINE 10 UNIT(S): 100 INJECTION, SOLUTION SUBCUTANEOUS at 08:05

## 2024-07-18 RX ADMIN — INSULIN LISPRO 8: 100 INJECTION, SOLUTION SUBCUTANEOUS at 17:19

## 2024-07-18 RX ADMIN — TAMOXIFEN CITRATE 20 MILLIGRAM(S): 10 TABLET, FILM COATED ORAL at 10:01

## 2024-07-18 RX ADMIN — ENOXAPARIN SODIUM 110 MILLIGRAM(S): 100 INJECTION SUBCUTANEOUS at 21:44

## 2024-07-18 RX ADMIN — KETOROLAC TROMETHAMINE 15 MILLIGRAM(S): 30 INJECTION, SOLUTION INTRAMUSCULAR at 08:07

## 2024-07-18 RX ADMIN — INSULIN LISPRO 4: 100 INJECTION, SOLUTION SUBCUTANEOUS at 12:12

## 2024-07-18 RX ADMIN — ENOXAPARIN SODIUM 110 MILLIGRAM(S): 100 INJECTION SUBCUTANEOUS at 10:01

## 2024-07-18 RX ADMIN — KETOROLAC TROMETHAMINE 15 MILLIGRAM(S): 30 INJECTION, SOLUTION INTRAMUSCULAR at 08:40

## 2024-07-18 RX ADMIN — Medication 1 APPLICATION(S): at 10:00

## 2024-07-18 RX ADMIN — INSULIN LISPRO 2: 100 INJECTION, SOLUTION SUBCUTANEOUS at 08:05

## 2024-07-18 NOTE — PROGRESS NOTE ADULT - ASSESSMENT
53 year old Hisp female w hx breast CA s/p lumpectomy, RT on tamoxifen,, DM II, HTN came to ED in wheelchair c/o LBP    Pain localized in left lower back radiates down into left posterior thigh, started Saturday(7/13/24)   Has been unable to walk due to pain    Patient with hx similar BP 2 yrs ago-spontaneous resolution    Admits to (+) cough x 2 days.  Seen in super track, found to be hypoxic to 87% on room air and brought to the main ER  (+) Covid-denies recent travel or sick contacts    In ED /72      RR 20   T 99.6   89% sat RA  2 L nc  CXR no acute disease; CTA no LA, no PNA  solumedrol 40 mg then decadron 6 mg daily    She admitted for acute hypoxic resp failure due to COVID-19, intractable LBP    IMP:  Intractable BP-improving  HNP L1/2 with DDD/stenosis  (+) Covid  resolving hypoxia    PLAN:  Patient admitted to medicine  Decadron 6 mg daily continues  Analgesia prn  Medical management  OOB/PT as tolerated

## 2024-07-18 NOTE — PROGRESS NOTE ADULT - ASSESSMENT
53 year old female w hx breast CA s/p lumpectomy, RT on tamoxifen,, DM II, HTN came to ED in wheelchair c/o LBP  Hypoxic in ED        #Acute hypoxic resp failure, improved   #COVID-19 detected  CTA no PE no PNA  Resp and contact for COVID-19  supplemental O2 as needed   S/p solumedrol 40 mg in ED  c/w  decadron 6 mg IV QD  C/w  remdesivir  inflammatory markers elevated       #Intractable LBP w sciatica on L. L1-2 herniated Disc   has  partial relief w meds  C/w  ketorolac 15 mg Oq 6 hrs prn mod pain and  flexeril 10 mg TID prn spasm  C/w  Oxy PRN  On Decadrone IV  MRI reviewed With ortho, had Herniated disc with radiculopathy conservative management recommended       #Breast CA hx   continue tamoxifen      #DM II wit hyperglycemia   hold oral agents   diet  C/w Lantus, increase to 18U QAC  BGM  ISS    #HTN  continue  amlodipine and losartan       #Obesity  is on mounjaro  hold  while in the hospital       #VTE  lovenox q 12 for COVID/obesity      Dispo; C/w current care, dc planning if continues to improve

## 2024-07-18 NOTE — PROGRESS NOTE ADULT - SUBJECTIVE AND OBJECTIVE BOX
Physical Therapy Evaluation:    2 forms of pt ID verified:name,birthdate and pt ID karo    Patient's Name: Nathalia Monge    Admitting Diagnosis  Hypokalemia [E87.6]  Hypomagnesemia [E83.42]  Seizure (HCC) [R56.9]  Meningioma (HCC) [D32.9]  Pyuria [R82.81]  Fever [R50.9]  Pneumonia, unspecified organism [J18.9]  Sepsis (HCC) [A41.9]  Right upper lobe pulmonary nodule [R91.1]  Pyelonephritis of right kidney [N12]  Hyperglycemia due to diabetes mellitus (HCC) [E11.65]    Problem List  Patient Active Problem List   Diagnosis    Partial idiopathic epilepsy with seizures of localized onset, intractable, without status epilepticus (HCC)    Type 2 diabetes mellitus with hyperglycemia, with long-term current use of insulin (HCC)    Hypertensive urgency    Urinary urgency    Meningioma (HCC)    S/P craniotomy    Hyponatremia    Seizure disorder (HCC)    Sepsis without acute organ dysfunction (HCC)       Past Medical History  Past Medical History:   Diagnosis Date    Bipolar depression (HCC)     Cancer (HCC)     Depression     Diabetes mellitus (HCC)     Hypertension     PTSD (post-traumatic stress disorder) 2002    Seizures (HCC)        Past Surgical History  Past Surgical History:   Procedure Laterality Date    CHOLECYSTECTOMY  2004    HYSTERECTOMY      IMPLANTABLE CONTACT LENS IMPLANTATION      WA CRANIEC TREPHINE BONE FLP BRAIN TUMOR SUPRTENTOR Right 3/15/2018    Procedure: IMAGE-GUIDED RIGHT FRONTOPARIETAL CRANIOTOMY FOR TUMOR;  Surgeon: Arsh Jeronimo MD;  Location: [facility];  Service: Neurosurgery          01/09/24 1300   PT Last Visit   PT Visit Date 01/09/24   Note Type   Note type Evaluation  (additional PT tx session following PT IE)   Pain Assessment   Pain Assessment Tool 0-10   Pain Score No Pain   Restrictions/Precautions   Other Precautions Contact/isolation;Impulsive;Chair Alarm;Bed Alarm;Multiple lines;Telemetry;Fall Risk;Seizure  (r/o CDiff)   Home Living   Type of Home Mobile home  ([facility])   Home  "Layout One level;Stairs to enter with rails;Performs ADLs on one level;Able to live on main level with bedroom/bathroom  (3-4 BRAYDEN)   Home Equipment Other (Comment)  (no DME  use PTA)   Additional Comments Pt lives with spouse in [facility] home, 1 level, (+)BRAYDEN, no use of DME PTA, reports no recent falls and  available to A upon DC as needed daily.   Prior Function   Level of [place] Independent with ADLs;Independent with functional mobility;Independent with IADLS   Lives With Spouse  (available to A upon DC as needed daily)   Receives Help From Family;Friend(s)  (as needed per pt)   IADLs Independent with driving;Independent with meal prep;Independent with medication management   Falls in the last 6 months 0  (per pt PTA)   General   Additional Pertinent History hypokalemia, hypomagnesemia, seizure activity, idiopathic epilepsy, sepsis, acute pyelonephritis/renal abscess,HTN urgency, x5 grand mal seizures in 1 week PTA   Family/Caregiver Present Yes  (pts )   Cognition   Overall Cognitive Status WFL   Arousal/Participation Cooperative   Orientation Level Oriented X4   Following Commands Follows one step commands with increased time or repetition  (pt is impulsive and has dec safety awareness)   Subjective   Subjective Pt supine in bed resting comfortably with  and nursing present; Pt willing and agreeable to work with PT and to participate in therapy intervention; \"This is my best day! I am feeling so much better today\"   RLE Assessment   RLE Assessment   (at least 4/5 grossly throughout)   LLE Assessment   LLE Assessment   (at least 4/5 grossly throughout)   Coordination   Movements are Fluid and Coordinated 0   Coordination and Movement Description unsteady and ataxic gait pattern, dec BLE step length, forward flexed posture   Sensation WFL   Light Touch   RLE Light Touch Grossly intact   LLE Light Touch Grossly intact   Bed Mobility   Supine to Sit 5  Supervision   Additional items Assist x " 1;Bedrails;Impulsive;Verbal cues   Transfers   Sit to Stand 5  Supervision   Additional items Assist x 1;Bedrails;Impulsive;Verbal cues   Stand to Sit 5  Supervision   Additional items Assist x 1;Armrests;Impulsive;Verbal cues   Toilet transfer 5  Supervision   Additional items Assist x 1;Impulsive;Verbal cues;Commode   Ambulation/Elevation   Gait pattern Narrow YEIMY;Forward Flexion;Inconsistent joseph;Foward flexed;Short stride;Ataxia   Gait Assistance 4  Minimal assist   Additional items Assist x 1;Verbal cues   Assistive Device None   Distance 5 steps bed->BSC with use of IV pole on hardwood julio   Balance   Static Sitting Fair +   Dynamic Sitting Fair   Static Standing Fair   Dynamic Standing Poor +   Ambulatory Poor +   Endurance Deficit   Endurance Deficit Yes   Endurance Deficit Description weakness BLE, fatigues easily,   Activity Tolerance   Activity Tolerance Patient limited by fatigue  (fair)   Nurse Made Aware yes (Joann)   Assessment   Prognosis Fair   Problem List Decreased strength;Impaired balance;Decreased endurance;Decreased mobility;Impaired judgement;Decreased safety awareness;Decreased skin integrity   Assessment Pt is a 62 yo female admitted to [facility] 2* hypokalemia, sepsis, HTN urgency, uncontrolled DM2, x5 grand mal seizures x1 week period PTA, idiopathic epilepsy. Pt lives with  in [facility]/mobile home, 1 level, (+)BRAYDEN, no use of DME PTA, reports being (I) with mobility, ADLs and IADLs PTA. Per pt, pts  willing and able to A pt upon DC as needed daily. Pt currently is not at functional mobility baseline, needs A for mobility, ataxic and unsteady gait and movement pattern, currently on contact precautions to r/o CDiff, impulsive at times, multiple lines, masimo monitoring, dec safety awareness. Pt demonstrates minimal deficits during functional mobility and gait including dec endurance, dec BLE strength, dec balance, dec safety awareness, impulsive, and needs S for bed mobility  and TT, min Ax1 for GT with use of IV pole. Pt would cont to benefit from skilled inpt PT services to maximize functional independence and to dec caregiver burden upon being DC from the hospital.   Barriers to Discharge Inaccessible home environment  ((+)BRAYDEN)   Goals   Patient Goals to get stronger and better everyday   STG Expiration Date 01/19/24   Short Term Goal #1 in 7-10 days: (1) Pt will be able to ambulate greater than 150 feet with use of appropriate DME on various surfaces needing S to mod (I) level of A in order to A pt to return to PLOF, (2) activity tolerance:45 mins/45mins, (3) pt will be able to perform sit to stand transfers needing mod (I) level of A to and from various surfaces consistently in order to return to PLOF, (4) pt will be able to perform BM needing mod (I) level of A to A pt to return to PLOF, (5) (I) with BLE therapeutic ex HEP in various positions to A pt to inc balance,strength,mobility,endurance (6) inc balance 1/2 grade in order to dec fall risk, (7) pt will be able to go up and down 4 steps with use of HR needing S level of A in order to navigate BRAYDEN as able and as needed prior to D/C, (8) cont to provide pt and pt family education for safe D/C planning, (9) inc BLE strength 1/2 to 1 full grade in order to A pt to inc balance,strength,mobility,endurance   PT Treatment Day 1  (addtional PT tx session following PT IE)   Plan   Treatment/Interventions Functional transfer training;LE strengthening/ROM;Elevations;Therapeutic exercise;Endurance training;Patient/family training;Equipment eval/education;Bed mobility;Gait training;Spoke to nursing   PT Frequency 3-5x/wk   Discharge Recommendation   Rehab Resource Intensity Level, PT III (Minimum Resource Intensity)   Equipment Recommended Walker  (TBD; recommend RW at this time, but pt currently declines recommendation)   Walker Package Recommended Wheeled walker   Change/add to Walker Package? No   AM-PAC Basic Mobility Inpatient   Turning  "in Flat Bed Without Bedrails 4   Lying on Back to Sitting on Edge of Flat Bed Without Bedrails 4   Moving Bed to Chair 4   Standing Up From Chair Using Arms 3   Walk in Room 3   Climb 3-5 Stairs With Railing 2   Basic Mobility Inpatient Raw Score 20   Basic Mobility Standardized Score 43.99   Highest Level Of Mobility   -HL Goal 6: Walk 10 steps or more   JH-HLM Achieved 4: Move to chair/commode     Time In:1310  Time Out:1325  Total Time: 15 mins      S:  Pt requests to ambulate further distance in hospital hallway. \"I just feel good today and want to walk\".  O:  Pt able to perform sit<->stand transfers needing S level of A. Pt able to ambulate an additional 60 feet with use of IV pole on various surfaces needing S level of A. (+)Ataxic and unsteady gait pattern during upright mobility with recommendation to trial RW for gait. Pt currently declines recommendation of RW at this time. Cont to assess LRAD. Pt reports minimal fatigue during and following mobility, gait distance and activity.  A:  Pt cont to need A for mobility and activity. Cont to recommend trial of LRAD for pt during upright mobility and gait. Pt would cont to benefit from skilled inpt PT services to maximize functional independence and to dec caregiver burden upon being DC from the hospital.  P:  cont skilled inpt PT services 3-5xs per week for mobility, education, endurance, balance and strength    Marilee Peterson, PT       @Marilee Peterson PT, DPT@   " HPI: hx obtained thru chart review/discussion with Dr. Hedrick/Mongolian interpretter #[id number]    53 year old Hisp female w hx breast CA s/p lumpectomy, RT on tamoxifen,, DM II, HTN came to ED in wheelchair c/o LBP    Pain localized in left lower back radiates down into left posterior thigh, started Saturday(7/13/24)   Has been unable to walk due to pain    Patient with hx similar BP 2 yrs ago-spontaneous resolution    Admits to (+) cough x 2 days.  Seen in super track, found to be hypoxic to 87% on room air and brought to the main ER  (+) Covid-denies recent travel or sick contacts    In ED /72      RR 20   T 99.6   89% sat RA  2 L nc  CXR no acute disease; CTA no VT, no PNA  solumedrol 40 mg then decadron 6 mg daily    She admitted for acute hypoxic resp failure due to COVID-19, intractable LBP    Today patient states patient somewhat better with ability to ambulate with walker    7/18/24 Attempted to contact son by phone but working-patient able to understand conversation better today. Still with L sided back pain but improving, ambulated in room with PT/walker, denies SOB/cough/chest pain    PAST MEDICAL HX  Bladder prolapse, female, acquired   Essential hypertension  Infiltrating ductal carcinoma of right breast 1.2 cm mass ER+  Malignant neoplasm of right female breast, unspecified estrogen receptor status, unspecified site of breast on tamoxifen  RT radiation therapy completed   Leukocytosis  Neoplasm of uncertain behavior of skin   Primary osteoarthritis of both knees   Type 2 diabetes mellitus without complication, without long-term current use of insulin   Uterine leiomyoma, unspecified location 2011.     PAST SURGICAL HX  R breast lumpectomy w R sentinel node bx   H/O prior ablation treatment 2011  H/O tubal ligation 1997, bilateral  Status post fine needle biopsy 7/16/18.     Home Medications:  amLODIPine 10 mg oral tablet: 1 tab(s) orally once a day (15 Jul 2024 18:04)  aspirin 81 mg oral delayed release tablet: 1 tab(s) orally once a day (in the evening) (15 Jul 2024 18:03)  cloNIDine 0.1 mg oral tablet: 1 tab(s) orally once a day (in the morning) ***prescribed to take 1 tab 2x a day but pt only takes 1 tab a day in the morning*** (15 Jul 2024 18:07)  Flonase Allergy Relief 50 mcg/inh nasal spray: 1 spray(s) in each nostril once a day ***pt did not start using because &quot;it was supposed to be for the ears, not the nose&quot;*** (15 Jul 2024 18:04)  furosemide 20 mg oral tablet: 1 tab(s) orally once a day (in the morning) ***prescribed to take 1 tab 2x a day but pt only takes 1 tab a day in the morning*** (15 Jul 2024 18:07)  glipiZIDE 5 mg oral tablet: 1 tab(s) orally once a day (15 Jul 2024 18:07)  Jardiance 25 mg oral tablet: 1 tab(s) orally once a day (15 Jul 2024 18:07)  losartan 100 mg oral tablet: 1 tab(s) orally once a day (15 Jul 2024 18:07)  Ozempic 4 mg/3 mL (1 mg dose) subcutaneous solution: 1 milligram(s) subcutaneously once a week on Fridays (15 Jul 2024 18:07)  tamoxifen 20 mg oral tablet: 1 tab(s) orally once a day (in the evening) (15 Jul 2024 18:05)    MEDICATIONS  (STANDING):  amLODIPine   Tablet 10 milliGRAM(s) Oral daily  aspirin enteric coated 81 milliGRAM(s) Oral daily  chlorhexidine 4% Liquid 1 Application(s) Topical <User Schedule>  dexAMETHasone  Injectable 6 milliGRAM(s) IV Push daily  dextrose 5%. 1000 milliLiter(s) (100 mL/Hr) IV Continuous <Continuous>  dextrose 5%. 1000 milliLiter(s) (50 mL/Hr) IV Continuous <Continuous>  dextrose 50% Injectable 25 Gram(s) IV Push once  dextrose 50% Injectable 25 Gram(s) IV Push once  dextrose 50% Injectable 12.5 Gram(s) IV Push once  enoxaparin Injectable 110 milliGRAM(s) SubCutaneous every 12 hours  furosemide    Tablet 20 milliGRAM(s) Oral daily  glucagon  Injectable 1 milliGRAM(s) IntraMuscular once  insulin lispro (ADMELOG) corrective regimen sliding scale   SubCutaneous three times a day before meals  insulin lispro (ADMELOG) corrective regimen sliding scale   SubCutaneous at bedtime  losartan 100 milliGRAM(s) Oral daily  remdesivir  IVPB 100 milliGRAM(s) IV Intermittent every 24 hours  remdesivir  IVPB   IV Intermittent   tamoxifen 20 milliGRAM(s) Oral daily    MEDICATIONS  (PRN):  acetaminophen     Tablet .. 650 milliGRAM(s) Oral every 4 hours PRN Temp greater or equal to 38.5C (101.3F)  cyclobenzaprine 10 milliGRAM(s) Oral three times a day PRN Muscle Spasm  dextrose Oral Gel 15 Gram(s) Oral once PRN Blood Glucose LESS THAN 70 milliGRAM(s)/deciliter  ketorolac   Injectable 15 milliGRAM(s) IV Push every 6 hours PRN Moderate Pain (4 - 6)  meclizine 12.5 milliGRAM(s) Oral three times a day PRN for dizziness  oxyCODONE    IR 5 milliGRAM(s) Oral every 6 hours PRN Severe Pain (7 - 10)    FAMILY HX  Father  Still living? No  Family history of stroke, Age at diagnosis: Age Unknown    Mother  Still living? No  Family history of diabetes mellitus, Age at diagnosis: Age Unknown  Family history of hypertension, Age at diagnosis: Age Unknown  Family history of kidney disease, Age at diagnosis: Age Unknown.    SOCIAL HX  works as a   former smoker quit 2021; smoked 2-3 cigarettes/day x 7 years  no alcohol  no drugs (15 Jul 2024 17:47)    ROS c/w HPI    PE:    Vital Signs Last 24 Hrs  T(C): 36.6 (18 Jul 2024 07:37), Max: 37.1 (17 Jul 2024 15:43)  T(F): 97.9 (18 Jul 2024 07:37), Max: 98.8 (17 Jul 2024 15:43)  HR: 120 (18 Jul 2024 11:00) (93 - 120)  BP: 134/77 (18 Jul 2024 07:37) (106/54 - 137/88)  BP(mean): 100 (17 Jul 2024 23:19) (100 - 100)  RR: 20 (18 Jul 2024 11:00) (18 - 20)  SpO2: 95% (18 Jul 2024 11:00) (93% - 95%)    Parameters below as of 18 Jul 2024 11:00  Patient On (Oxygen Delivery Method): room air    Patient laying flat in bed c/o L sided LBP  No nasal O2 in place  T-L spine-tender L lower lumbar spine, negative B/L SLR, no focal weakness LEs    STUDIES  MRI LS spine  LOCALIZER: No additional findings.  BONES: There is no fracture or bone marrow edema.  ALIGNMENT: The alignment is significant for straightening of the normal   lumbar lordosis which may reflect pain or muscle spasm.  SACROILIAC JOINTS/SACRUM: There is no sacral fracture. The SI joints are   partially visualized but are intact.  CONUS AND CAUDA EQUINA: The distal cord and conus are normal in signal.   Conus terminates at L1.  VISUALIZED INTRAPELVIC/INTRA-ABDOMINAL SOFT TISSUES: Normal.  PARASPINAL SOFT TISSUES: Normal.    INDIVIDUAL LEVELS:    LOWER THORACIC SPINE: No spinal canal orneuroforaminal stenosis.    L1-L2: Moderate disc degeneration with disc space narrowing and minimal   associated degenerative endplate changes. Disc bulge flattens the ventral   thecal sac and narrows the BILATERAL neural foramina. Superimposed   moderate central disc herniation contributes to moderate central stenosis   at this level.  L2-L3: Mild disc degeneration with disc space narrowing and disc bulge   which flattens the ventral thecal sac and narrows the BILATERAL neural   foramina. Superimposed moderate RIGHT foraminal disc herniation further   narrows the RIGHT neural foramen and abuts the exiting RIGHT L2 nerve   root.  L3-L4: Mild disc degeneration and bulge which flattens the ventral thecal   sac and narrows the BILATERAL neural foramina. Superimposed small LEFT   foraminal disc herniation further narrows the LEFT neural foramen and   abuts the exiting LEFT L3 nerve root. Mild central stenosis on a   degenerative basis due to disc bulge and facet osteophytic hypertrophy   withredundancy of the ligamentum flavum.  L4-L5: Mild disc degeneration with disc bulge that flattens the ventral   thecal sac and minimally narrows the inferior neural foramina.   Superimposed tiny central disc herniation indents the ventral thecal sac   and contributes to mild central stenosis in conjunction with facet   osteophytic hypertrophy.  L5-S1: Disc height is maintained. Tiny RIGHT paracentral disc herniation   abuts the descending RIGHT S1 nerve root.    CT C/A/P-  Unreported calcified HNP with stenosis L1/2 with DDD/vacuum phenomenon, mild stenosis L3/4 & L4/5

## 2024-07-19 ENCOUNTER — TRANSCRIPTION ENCOUNTER (OUTPATIENT)
Age: 54
End: 2024-07-19

## 2024-07-19 VITALS
SYSTOLIC BLOOD PRESSURE: 143 MMHG | OXYGEN SATURATION: 92 % | RESPIRATION RATE: 18 BRPM | DIASTOLIC BLOOD PRESSURE: 79 MMHG | TEMPERATURE: 98 F | HEART RATE: 111 BPM

## 2024-07-19 LAB
ALBUMIN SERPL ELPH-MCNC: 3 G/DL — LOW (ref 3.3–5)
ALP SERPL-CCNC: 66 U/L — SIGNIFICANT CHANGE UP (ref 40–120)
ALT FLD-CCNC: 22 U/L — SIGNIFICANT CHANGE UP (ref 12–78)
ANION GAP SERPL CALC-SCNC: 9 MMOL/L — SIGNIFICANT CHANGE UP (ref 5–17)
AST SERPL-CCNC: 16 U/L — SIGNIFICANT CHANGE UP (ref 15–37)
BILIRUB DIRECT SERPL-MCNC: <0.1 MG/DL — SIGNIFICANT CHANGE UP (ref 0–0.3)
BILIRUB INDIRECT FLD-MCNC: >0.5 MG/DL — SIGNIFICANT CHANGE UP (ref 0.2–1)
BILIRUB SERPL-MCNC: 0.6 MG/DL — SIGNIFICANT CHANGE UP (ref 0.2–1.2)
BUN SERPL-MCNC: 27 MG/DL — HIGH (ref 7–23)
CALCIUM SERPL-MCNC: 9.4 MG/DL — SIGNIFICANT CHANGE UP (ref 8.5–10.1)
CHLORIDE SERPL-SCNC: 103 MMOL/L — SIGNIFICANT CHANGE UP (ref 96–108)
CO2 SERPL-SCNC: 25 MMOL/L — SIGNIFICANT CHANGE UP (ref 22–31)
CREAT SERPL-MCNC: 1.04 MG/DL — SIGNIFICANT CHANGE UP (ref 0.5–1.3)
EGFR: 64 ML/MIN/1.73M2 — SIGNIFICANT CHANGE UP
GLUCOSE BLDC GLUCOMTR-MCNC: 213 MG/DL — HIGH (ref 70–99)
GLUCOSE BLDC GLUCOMTR-MCNC: 229 MG/DL — HIGH (ref 70–99)
GLUCOSE SERPL-MCNC: 298 MG/DL — HIGH (ref 70–99)
HCT VFR BLD CALC: 51.5 % — HIGH (ref 34.5–45)
HGB BLD-MCNC: 17 G/DL — HIGH (ref 11.5–15.5)
INR BLD: 1.1 RATIO — SIGNIFICANT CHANGE UP (ref 0.85–1.18)
MCHC RBC-ENTMCNC: 31.3 PG — SIGNIFICANT CHANGE UP (ref 27–34)
MCHC RBC-ENTMCNC: 33 GM/DL — SIGNIFICANT CHANGE UP (ref 32–36)
MCV RBC AUTO: 94.7 FL — SIGNIFICANT CHANGE UP (ref 80–100)
PLATELET # BLD AUTO: 229 K/UL — SIGNIFICANT CHANGE UP (ref 150–400)
POTASSIUM SERPL-MCNC: 3.5 MMOL/L — SIGNIFICANT CHANGE UP (ref 3.5–5.3)
POTASSIUM SERPL-SCNC: 3.5 MMOL/L — SIGNIFICANT CHANGE UP (ref 3.5–5.3)
PROT SERPL-MCNC: 6.7 GM/DL — SIGNIFICANT CHANGE UP (ref 6–8.3)
PROTHROM AB SERPL-ACNC: 12.4 SEC — SIGNIFICANT CHANGE UP (ref 9.5–13)
RBC # BLD: 5.44 M/UL — HIGH (ref 3.8–5.2)
RBC # FLD: 14.6 % — HIGH (ref 10.3–14.5)
SODIUM SERPL-SCNC: 137 MMOL/L — SIGNIFICANT CHANGE UP (ref 135–145)
WBC # BLD: 15.31 K/UL — HIGH (ref 3.8–10.5)
WBC # FLD AUTO: 15.31 K/UL — HIGH (ref 3.8–10.5)

## 2024-07-19 PROCEDURE — 99239 HOSP IP/OBS DSCHRG MGMT >30: CPT

## 2024-07-19 RX ORDER — PANTOPRAZOLE SODIUM 40 MG/10ML
1 INJECTION, POWDER, FOR SOLUTION INTRAVENOUS
Qty: 10 | Refills: 0
Start: 2024-07-19 | End: 2024-07-28

## 2024-07-19 RX ORDER — ACETAMINOPHEN 325 MG
3 TABLET ORAL
Qty: 0 | Refills: 0 | DISCHARGE
Start: 2024-07-19

## 2024-07-19 RX ORDER — MELOXICAM 7.5 MG/1
1 TABLET ORAL
Qty: 10 | Refills: 0
Start: 2024-07-19 | End: 2024-07-28

## 2024-07-19 RX ORDER — FUROSEMIDE 10 MG/ML
1 INJECTION, SOLUTION INTRAMUSCULAR; INTRAVENOUS
Qty: 0 | Refills: 0 | DISCHARGE

## 2024-07-19 RX ORDER — CLONIDINE HYDROCHLORIDE 0.3 MG/1
1 TABLET ORAL
Refills: 0 | DISCHARGE

## 2024-07-19 RX ORDER — OXYCODONE HYDROCHLORIDE 100 MG/5ML
1 SOLUTION ORAL
Qty: 10 | Refills: 0
Start: 2024-07-19 | End: 2024-07-23

## 2024-07-19 RX ADMIN — INSULIN GLARGINE 18 UNIT(S): 100 INJECTION, SOLUTION SUBCUTANEOUS at 08:11

## 2024-07-19 RX ADMIN — ENOXAPARIN SODIUM 110 MILLIGRAM(S): 100 INJECTION SUBCUTANEOUS at 10:05

## 2024-07-19 RX ADMIN — INSULIN LISPRO 4: 100 INJECTION, SOLUTION SUBCUTANEOUS at 08:12

## 2024-07-19 RX ADMIN — Medication 1 APPLICATION(S): at 12:05

## 2024-07-19 RX ADMIN — AMLODIPINE BESYLATE 10 MILLIGRAM(S): 2.5 TABLET ORAL at 10:05

## 2024-07-19 RX ADMIN — DEXAMETHASONE 6 MILLIGRAM(S): 1 TABLET ORAL at 10:05

## 2024-07-19 RX ADMIN — OXYCODONE HYDROCHLORIDE 5 MILLIGRAM(S): 100 SOLUTION ORAL at 11:04

## 2024-07-19 RX ADMIN — OXYCODONE HYDROCHLORIDE 5 MILLIGRAM(S): 100 SOLUTION ORAL at 10:16

## 2024-07-19 RX ADMIN — TAMOXIFEN CITRATE 20 MILLIGRAM(S): 10 TABLET, FILM COATED ORAL at 10:06

## 2024-07-19 RX ADMIN — ASPIRIN 81 MILLIGRAM(S): 325 TABLET, FILM COATED ORAL at 10:05

## 2024-07-19 RX ADMIN — LOSARTAN POTASSIUM 100 MILLIGRAM(S): 100 TABLET, FILM COATED ORAL at 10:05

## 2024-07-19 RX ADMIN — FUROSEMIDE 20 MILLIGRAM(S): 10 INJECTION, SOLUTION INTRAMUSCULAR; INTRAVENOUS at 10:05

## 2024-07-19 RX ADMIN — INSULIN LISPRO 4: 100 INJECTION, SOLUTION SUBCUTANEOUS at 12:06

## 2024-07-19 NOTE — PROGRESS NOTE ADULT - ASSESSMENT
53 year old Hisp female w hx breast CA s/p lumpectomy, RT on tamoxifen,, DM II, HTN came to ED in wheelchair c/o LBP    Pain localized in left lower back radiates down into left posterior thigh, started Saturday(7/13/24)   Has been unable to walk due to pain    Patient with hx similar BP 2 yrs ago-spontaneous resolution    Admits to (+) cough x 2 days.  Seen in super track, found to be hypoxic to 87% on room air and brought to the main ER  (+) Covid-denies recent travel or sick contacts    In ED /72      RR 20   T 99.6   89% sat RA  2 L nc  CXR no acute disease; CTA no SD, no PNA  solumedrol 40 mg then decadron 6 mg daily    She admitted for acute hypoxic resp failure due to COVID-19, intractable LBP    IMP:  Intractable BP-improved  HNP L1/2 with DDD/stenosis  (+) Covid  resolving hypoxia    PLAN:  Patient admitted to medicine  Decadron 6 mg daily continues-as per Dr. Hedrick  Analgesia prn  Medical management  OOB/PT as tolerated  Patient stable for D/C from Spine-F/U Dr. Lawson if pain recurs at [phone]

## 2024-07-19 NOTE — DISCHARGE NOTE PROVIDER - NSDCMRMEDTOKEN_GEN_ALL_CORE_FT
acetaminophen 325 mg oral tablet: 3 tab(s) orally 3 times a day as needed for mild to moderate pain  amLODIPine 10 mg oral tablet: 1 tab(s) orally once a day  aspirin 81 mg oral delayed release tablet: 1 tab(s) orally once a day (in the evening)  cyclobenzaprine 10 mg oral tablet: 1 tab(s) orally 2 times a day as needed for Muscle Spasm  Flonase Allergy Relief 50 mcg/inh nasal spray: 1 spray(s) in each nostril once a day  furosemide 20 mg oral tablet: 1 tab(s) orally once a day (in the morning)  glipiZIDE 5 mg oral tablet: 1 tab(s) orally once a day  Jardiance 25 mg oral tablet: 1 tab(s) orally once a day  losartan 100 mg oral tablet: 1 tab(s) orally once a day  meclizine 12.5 mg oral tablet: 1 tab(s) orally 3 times a day as needed for  dizziness  meloxicam 15 mg oral tablet: 1 tab(s) orally once a day as needed for  moderate pain take daily x 3  days and then take as needed  oxyCODONE 5 mg oral tablet: 1 tab(s) orally 2 times a day as needed for  severe pain MDD: 2  tabs  Ozempic 4 mg/3 mL (1 mg dose) subcutaneous solution: 1 milligram(s) subcutaneously once a week on Fridays  pantoprazole 40 mg oral delayed release tablet: 1 tab(s) orally once a day  tamoxifen 20 mg oral tablet: 1 tab(s) orally once a day (in the evening)

## 2024-07-19 NOTE — DISCHARGE NOTE PROVIDER - CARE PROVIDER_API CALL
Katlyn Walker  [facility]  [address] [place] [address]  Phone: [phone]  Fax: [phone]  Follow Up Time: 1 week    Gael Lawson  Orthopaedic Surgery  [address] [place] [id number]  Phone: [phone]  Fax: [phone]  Follow Up Time:

## 2024-07-19 NOTE — PROGRESS NOTE ADULT - REASON FOR ADMISSION
acute hypoxic resp failure due to  COVID-19  intractable back pain

## 2024-07-19 NOTE — DISCHARGE NOTE NURSING/CASE MANAGEMENT/SOCIAL WORK - PATIENT PORTAL LINK FT
You can access the FollowMyHealth Patient Portal offered by [facility] by registering at the following website: [email or web address]. By joining [email or web address]’s FollowMyHealth portal, you will also be able to view your health information using other applications (apps) compatible with our system.

## 2024-07-19 NOTE — DISCHARGE NOTE PROVIDER - NSDCCPCAREPLAN_GEN_ALL_CORE_FT
PRINCIPAL DISCHARGE DIAGNOSIS  Diagnosis: COVID  Assessment and Plan of Treatment: completed treatment      SECONDARY DISCHARGE DIAGNOSES  Diagnosis: Lumbar herniated disc  Assessment and Plan of Treatment: take Meloxicam 1 tab daily x 3 days and then as needed  Take tylenol or oxy as needed  Take Flexeril if have muscle spasm   MAy f/u with Dr Lawson for further management    Diagnosis: HTN (hypertension)  Assessment and Plan of Treatment: C/w Amlodipine, losartan and lasix   Hold clonidine for now  F/u with Dr yun to check BP

## 2024-07-19 NOTE — DISCHARGE NOTE PROVIDER - NSDCFUSCHEDAPPT_GEN_ALL_CORE_FT
[facility]  BRSTIMAG [address]  Scheduled Appointment: 07/20/2024    Shannen Montoya  [facility]  ENDOCRIN [address] [facility] [address]  Scheduled Appointment: 07/29/2024    Luis Mazariegos  [facility]  SURGONC [address]  Scheduled Appointment: 08/09/2024    Gina Li  [facility]  [facility]  Scheduled Appointment: 09/27/2024

## 2024-07-19 NOTE — PROGRESS NOTE ADULT - SUBJECTIVE AND OBJECTIVE BOX
HPI: hx obtained thru chart review/discussion with Dr. Hedrick/Singaporean interpretter #[id number]    53 year old Hisp female w hx breast CA s/p lumpectomy, RT on tamoxifen,, DM II, HTN came to ED in wheelchair c/o LBP    Pain localized in left lower back radiates down into left posterior thigh, started Saturday(7/13/24)   Has been unable to walk due to pain    Patient with hx similar BP 2 yrs ago-spontaneous resolution    Admits to (+) cough x 2 days.  Seen in super track, found to be hypoxic to 87% on room air and brought to the main ER  (+) Covid-denies recent travel or sick contacts    In ED /72      RR 20   T 99.6   89% sat RA  2 L nc  CXR no acute disease; CTA no NE, no PNA  solumedrol 40 mg then decadron 6 mg daily    She admitted for acute hypoxic resp failure due to COVID-19, intractable LBP    Today patient states patient somewhat better with ability to ambulate with walker    7/18/24 Attempted to contact son by phone but working-patient able to understand conversation better today. Still with L sided back pain but improving, ambulated in room with PT/walker, denies SOB/cough/chest pain  7/19/24 Patient notes improved back pain though asked for oxycodone earlier today    PAST MEDICAL HX  Bladder prolapse, female, acquired   Essential hypertension  Infiltrating ductal carcinoma of right breast 1.2 cm mass ER+  Malignant neoplasm of right female breast, unspecified estrogen receptor status, unspecified site of breast on tamoxifen  RT radiation therapy completed   Leukocytosis  Neoplasm of uncertain behavior of skin   Primary osteoarthritis of both knees   Type 2 diabetes mellitus without complication, without long-term current use of insulin   Uterine leiomyoma, unspecified location 2011.     PAST SURGICAL HX  R breast lumpectomy w R sentinel node bx   H/O prior ablation treatment 2011  H/O tubal ligation 1997, bilateral  Status post fine needle biopsy 7/16/18.     Home Medications:  amLODIPine 10 mg oral tablet: 1 tab(s) orally once a day (15 Jul 2024 18:04)  aspirin 81 mg oral delayed release tablet: 1 tab(s) orally once a day (in the evening) (15 Jul 2024 18:03)  cloNIDine 0.1 mg oral tablet: 1 tab(s) orally once a day (in the morning) ***prescribed to take 1 tab 2x a day but pt only takes 1 tab a day in the morning*** (15 Jul 2024 18:07)  Flonase Allergy Relief 50 mcg/inh nasal spray: 1 spray(s) in each nostril once a day ***pt did not start using because &quot;it was supposed to be for the ears, not the nose&quot;*** (15 Jul 2024 18:04)  furosemide 20 mg oral tablet: 1 tab(s) orally once a day (in the morning) ***prescribed to take 1 tab 2x a day but pt only takes 1 tab a day in the morning*** (15 Jul 2024 18:07)  glipiZIDE 5 mg oral tablet: 1 tab(s) orally once a day (15 Jul 2024 18:07)  Jardiance 25 mg oral tablet: 1 tab(s) orally once a day (15 Jul 2024 18:07)  losartan 100 mg oral tablet: 1 tab(s) orally once a day (15 Jul 2024 18:07)  Ozempic 4 mg/3 mL (1 mg dose) subcutaneous solution: 1 milligram(s) subcutaneously once a week on Fridays (15 Jul 2024 18:07)  tamoxifen 20 mg oral tablet: 1 tab(s) orally once a day (in the evening) (15 Jul 2024 18:05)    MEDICATIONS  (STANDING):  amLODIPine   Tablet 10 milliGRAM(s) Oral daily  aspirin enteric coated 81 milliGRAM(s) Oral daily  chlorhexidine 4% Liquid 1 Application(s) Topical <User Schedule>  dexAMETHasone  Injectable 6 milliGRAM(s) IV Push daily  dextrose 5%. 1000 milliLiter(s) (100 mL/Hr) IV Continuous <Continuous>  dextrose 5%. 1000 milliLiter(s) (50 mL/Hr) IV Continuous <Continuous>  dextrose 50% Injectable 25 Gram(s) IV Push once  dextrose 50% Injectable 25 Gram(s) IV Push once  dextrose 50% Injectable 12.5 Gram(s) IV Push once  enoxaparin Injectable 110 milliGRAM(s) SubCutaneous every 12 hours  furosemide    Tablet 20 milliGRAM(s) Oral daily  glucagon  Injectable 1 milliGRAM(s) IntraMuscular once  insulin lispro (ADMELOG) corrective regimen sliding scale   SubCutaneous three times a day before meals  insulin lispro (ADMELOG) corrective regimen sliding scale   SubCutaneous at bedtime  losartan 100 milliGRAM(s) Oral daily  remdesivir  IVPB 100 milliGRAM(s) IV Intermittent every 24 hours  remdesivir  IVPB   IV Intermittent   tamoxifen 20 milliGRAM(s) Oral daily    MEDICATIONS  (PRN):  acetaminophen     Tablet .. 650 milliGRAM(s) Oral every 4 hours PRN Temp greater or equal to 38.5C (101.3F)  cyclobenzaprine 10 milliGRAM(s) Oral three times a day PRN Muscle Spasm  dextrose Oral Gel 15 Gram(s) Oral once PRN Blood Glucose LESS THAN 70 milliGRAM(s)/deciliter  ketorolac   Injectable 15 milliGRAM(s) IV Push every 6 hours PRN Moderate Pain (4 - 6)  meclizine 12.5 milliGRAM(s) Oral three times a day PRN for dizziness  oxyCODONE    IR 5 milliGRAM(s) Oral every 6 hours PRN Severe Pain (7 - 10)    FAMILY HX  Father  Still living? No  Family history of stroke, Age at diagnosis: Age Unknown    Mother  Still living? No  Family history of diabetes mellitus, Age at diagnosis: Age Unknown  Family history of hypertension, Age at diagnosis: Age Unknown  Family history of kidney disease, Age at diagnosis: Age Unknown.    SOCIAL HX  works as a   former smoker quit 2021; smoked 2-3 cigarettes/day x 7 years  no alcohol  no drugs (15 Jul 2024 17:47)    ROS c/w HPI    PE:    Vital Signs Last 24 Hrs  T(C): 36.8 (19 Jul 2024 08:04), Max: 36.9 (18 Jul 2024 23:05)  T(F): 98.2 (19 Jul 2024 08:04), Max: 98.4 (18 Jul 2024 23:05)  HR: 95 (19 Jul 2024 08:04) (95 - 120)  BP: 128/72 (19 Jul 2024 08:04) (128/72 - 146/82)  BP(mean): --  RR: 18 (19 Jul 2024 08:04) (18 - 20)  SpO2: 93% (19 Jul 2024 08:04) (92% - 95%)    Parameters below as of 19 Jul 2024 08:04  Patient On (Oxygen Delivery Method): room air    Patient laying flat in bed without c/o LBP presently  No nasal O2 in place  T-L spine-no tenderness lumbar spine,   negative B/L SLR,   no focal weakness LEs    STUDIES  MRI LS spine  LOCALIZER: No additional findings.  BONES: There is no fracture or bone marrow edema.  ALIGNMENT: The alignment is significant for straightening of the normal   lumbar lordosis which may reflect pain or muscle spasm.  SACROILIAC JOINTS/SACRUM: There is no sacral fracture. The SI joints are   partially visualized but are intact.  CONUS AND CAUDA EQUINA: The distal cord and conus are normal in signal.   Conus terminates at L1.  VISUALIZED INTRAPELVIC/INTRA-ABDOMINAL SOFT TISSUES: Normal.  PARASPINAL SOFT TISSUES: Normal.    INDIVIDUAL LEVELS:    LOWER THORACIC SPINE: No spinal canal orneuroforaminal stenosis.    L1-L2: Moderate disc degeneration with disc space narrowing and minimal   associated degenerative endplate changes. Disc bulge flattens the ventral   thecal sac and narrows the BILATERAL neural foramina. Superimposed   moderate central disc herniation contributes to moderate central stenosis   at this level.  L2-L3: Mild disc degeneration with disc space narrowing and disc bulge   which flattens the ventral thecal sac and narrows the BILATERAL neural   foramina. Superimposed moderate RIGHT foraminal disc herniation further   narrows the RIGHT neural foramen and abuts the exiting RIGHT L2 nerve   root.  L3-L4: Mild disc degeneration and bulge which flattens the ventral thecal   sac and narrows the BILATERAL neural foramina. Superimposed small LEFT   foraminal disc herniation further narrows the LEFT neural foramen and   abuts the exiting LEFT L3 nerve root. Mild central stenosis on a   degenerative basis due to disc bulge and facet osteophytic hypertrophy   withredundancy of the ligamentum flavum.  L4-L5: Mild disc degeneration with disc bulge that flattens the ventral   thecal sac and minimally narrows the inferior neural foramina.   Superimposed tiny central disc herniation indents the ventral thecal sac   and contributes to mild central stenosis in conjunction with facet   osteophytic hypertrophy.  L5-S1: Disc height is maintained. Tiny RIGHT paracentral disc herniation   abuts the descending RIGHT S1 nerve root.    CT C/A/P-  Unreported calcified HNP with stenosis L1/2 with DDD/vacuum phenomenon, mild stenosis L3/4 & L4/5

## 2024-07-19 NOTE — DISCHARGE NOTE PROVIDER - HOSPITAL COURSE
53 year old female w hx breast CA s/p lumpectomy, RT on tamoxifen,, DM II, HTN came to ED in wheelchair c/o LBP pain is  left lower back radiates down into left posterior thigh, started Saturday  Has been unable to walk due to pain. Also Pt reported + cough x 2 days.    In ED /72      RR 20   T 99.6   89% sat RA, was started  on 2 L nc  CXR no acute disease; CTA no PE, no PNA. Pt was started on decadron and Remdesivir. Also was started on Pain meds and muscle relaxants.   Pts symptoms slowly improved. Pt is now on RA, pulse ox stable.   Pt was evaluated by  Spinal Sx and Had MRI done, noted L1-2 herniated disc and spinal stenosis, medical management recommended. BP is  able to ambulate with a walker, pain  is much improved.   Today Pt reports feels well no sob, pain is controlled, wants to go home.  results, meds, dc planning and follow up d/w Pt and family at bedside     Vital Signs Last 24 Hrs  T(C): 36.8 (19 Jul 2024 08:04), Max: 36.9 (18 Jul 2024 23:05)  T(F): 98.2 (19 Jul 2024 08:04), Max: 98.4 (18 Jul 2024 23:05)  HR: 95 (19 Jul 2024 08:04) (95 - 111)  BP: 128/72 (19 Jul 2024 08:04) (128/72 - 146/82)  RR: 18 (19 Jul 2024 08:04) (18 - 19)  SpO2: 93% (19 Jul 2024 08:04) (92% - 95%)    Parameters below as of 19 Jul 2024 08:04  Patient On (Oxygen Delivery Method): room air      PHYSICAL EXAM:  General: in no acute distress  Eyes: , EOMI; conjunctiva and sclera clear  Head: Normocephalic; atraumatic  ENMT: No nasal discharge; airway clear  Respiratory: decreased BS,  No wheezes  Cardiovascular: Regular rate and rhythm. S1 and S2 Normal; No murmur  Gastrointestinal: Soft non-tender non-distended; Normal bowel sounds  Genitourinary: No  suprapubic  tenderness  Extremities: No r edema  Neurological: Alert and oriented x 3, non focal   Skin: Warm and dry. No acute rash  Musculoskeletal: Normal muscle tone, L upper thight/L hip non  tender with palpation, improved  ROM       A/P: 53 year old female w hx breast CA s/p lumpectomy, RT on tamoxifen,, DM II, HTN  admitted for:       #Acute hypoxic resp failure, resolved  due to COVID-19 detected  CTA no PE no PNA  S/p supplemental O2, now on RA   On   decadron 6 mg IV QD will complete   On IV   remdesivir, will complete   inflammatory markers elevated   D-dimers wnl   F/u with PCP     #Intractable LBP w sciatica on L.  L1-2 herniated Disc  with spinal stenosis   pain improved, able to ambulate with walker   on  ketorolac, change to Meloxicam    C/w flexeril as needed   C/w  Oxy PRN  On Decadron IV, will complete Tx   MRI reviewed With ortho, had Herniated disc with radiculopathy conservative management recommended   Pt may f/u with Dr Lawson outPt       #Breast CA hx   continue tamoxifen  F/u with Onc     #DM II wit hyperglycemia, likely 2/2 steroids  Now will stop  resume home meds  Resume Ozempic   F/u with Endo     #HTN  continue  amlodipine and losartan, lasix   continue to hold clonidine for now       #Obesity  is on Ozempic  Diet and exercise discussed  F/u with PCP and Endo     Dispo; stable for dc home with HC   Fax dc summary to PCP  Total time 50 min

## 2024-07-20 ENCOUNTER — APPOINTMENT (OUTPATIENT)
Dept: MAMMOGRAPHY | Facility: CLINIC | Age: 54
End: 2024-07-20

## 2024-07-26 DIAGNOSIS — I10 ESSENTIAL (PRIMARY) HYPERTENSION: ICD-10-CM

## 2024-07-26 DIAGNOSIS — Z98.51 TUBAL LIGATION STATUS: ICD-10-CM

## 2024-07-26 DIAGNOSIS — Z79.82 LONG TERM (CURRENT) USE OF ASPIRIN: ICD-10-CM

## 2024-07-26 DIAGNOSIS — Z79.85 LONG-TERM (CURRENT) USE OF INJECTABLE NON-INSULIN ANTIDIABETIC DRUGS: ICD-10-CM

## 2024-07-26 DIAGNOSIS — T38.0X5A ADVERSE EFFECT OF GLUCOCORTICOIDS AND SYNTHETIC ANALOGUES, INITIAL ENCOUNTER: ICD-10-CM

## 2024-07-26 DIAGNOSIS — J96.01 ACUTE RESPIRATORY FAILURE WITH HYPOXIA: ICD-10-CM

## 2024-07-26 DIAGNOSIS — Z98.890 OTHER SPECIFIED POSTPROCEDURAL STATES: ICD-10-CM

## 2024-07-26 DIAGNOSIS — M51.16 INTERVERTEBRAL DISC DISORDERS WITH RADICULOPATHY, LUMBAR REGION: ICD-10-CM

## 2024-07-26 DIAGNOSIS — Z79.84 LONG TERM (CURRENT) USE OF ORAL HYPOGLYCEMIC DRUGS: ICD-10-CM

## 2024-07-26 DIAGNOSIS — M48.061 SPINAL STENOSIS, LUMBAR REGION WITHOUT NEUROGENIC CLAUDICATION: ICD-10-CM

## 2024-07-26 DIAGNOSIS — E66.9 OBESITY, UNSPECIFIED: ICD-10-CM

## 2024-07-26 DIAGNOSIS — Z92.3 PERSONAL HISTORY OF IRRADIATION: ICD-10-CM

## 2024-07-26 DIAGNOSIS — Z79.810 LONG TERM (CURRENT) USE OF SELECTIVE ESTROGEN RECEPTOR MODULATORS (SERMS): ICD-10-CM

## 2024-07-26 DIAGNOSIS — Z85.3 PERSONAL HISTORY OF MALIGNANT NEOPLASM OF BREAST: ICD-10-CM

## 2024-07-26 DIAGNOSIS — Z88.8 ALLERGY STATUS TO OTHER DRUGS, MEDICAMENTS AND BIOLOGICAL SUBSTANCES: ICD-10-CM

## 2024-07-26 DIAGNOSIS — Z87.891 PERSONAL HISTORY OF NICOTINE DEPENDENCE: ICD-10-CM

## 2024-07-26 DIAGNOSIS — U07.1 COVID-19: ICD-10-CM

## 2024-07-26 DIAGNOSIS — E11.65 TYPE 2 DIABETES MELLITUS WITH HYPERGLYCEMIA: ICD-10-CM

## 2024-07-29 ENCOUNTER — APPOINTMENT (OUTPATIENT)
Dept: ENDOCRINOLOGY | Facility: CLINIC | Age: 54
End: 2024-07-29

## 2024-08-12 RX ORDER — ESTRADIOL 0.03 MG/D
FILM, EXTENDED RELEASE TRANSDERMAL
Qty: 8 PATCH | Refills: 2 | Status: SHIPPED | OUTPATIENT
Start: 2024-08-12

## 2024-08-14 NOTE — ASU PREOP CHECKLIST - HEIGHT IN INCHES
"Pt has tolerated diuresing during this admission.  Echo showed The left ventricle is moderately dilated. There is eccentric hypertrophy. Moderate global hypokinesis present. There is moderately reduced systolic function with a visually estimated ejection fraction of 30 - 35%.  She had some bilateral pain and a US doppler BLE was negative. By discharge pt was Euvolemic. She was homeless and discharged to the [facility] for respite.  On day of discharge, patient was hemodynamically stable. [unfilled] was sent home with instructions to continue home medications, change dosage/frequency of home medications, and/or take new medications as mentioned under "Medication Reconciliation" below. These changes were further explained by patient's nurse or the clinical pharmacist. Patient will need to schedule with their PCP for hospital discharge followup. Patient agreeable to discharge plan & expressed understanding of all aforementioned changes. All questions were answered and return precautions were discussed & detailed in the after-visit summary.    "
8/12/2024 per HPI   8/13/2024 Remains on Lasix 80mg IV BID with 6.3L out overnight negative 7.6L since admission. Echo yesterday with EF 30-35% H&H 8.5&27.3 unchanged from yesterday. BMP with creatinine 1.3. BLE venous ultrasound negative for DVT   8/14/2024 Remains on IV Lasix BID with 1.6L out overnight negative 9.2L since admission likely less net negative given on intake documented. Creatinine up to 1.8. HR and BP stable. H&H with trend up to 9.4&29.8. SOB and BLE edema   
3

## 2024-08-23 RX ORDER — ROSUVASTATIN CALCIUM 10 MG/1
10 TABLET, COATED ORAL DAILY
Qty: 90 TABLET | Refills: 1 | Status: SHIPPED | OUTPATIENT
Start: 2024-08-23

## 2024-09-18 ENCOUNTER — OUTPATIENT (OUTPATIENT)
Dept: OUTPATIENT SERVICES | Facility: HOSPITAL | Age: 54
LOS: 1 days | Discharge: HOME | End: 2024-09-18

## 2024-09-18 DIAGNOSIS — Z98.890 OTHER SPECIFIED POSTPROCEDURAL STATES: Chronic | ICD-10-CM

## 2024-09-18 DIAGNOSIS — C50.919 MALIGNANT NEOPLASM OF UNSPECIFIED SITE OF UNSPECIFIED FEMALE BREAST: ICD-10-CM

## 2024-09-18 RX ORDER — ESTRADIOL 0.03 MG/D
FILM, EXTENDED RELEASE TRANSDERMAL
Qty: 24 PATCH | Refills: 2 | Status: SHIPPED | OUTPATIENT
Start: 2024-09-18

## 2024-09-26 ENCOUNTER — NON-APPOINTMENT (OUTPATIENT)
Age: 54
End: 2024-09-26

## 2024-09-27 ENCOUNTER — APPOINTMENT (OUTPATIENT)
Dept: SURGICAL ONCOLOGY | Facility: CLINIC | Age: 54
End: 2024-09-27

## 2024-09-27 ENCOUNTER — APPOINTMENT (OUTPATIENT)
Dept: HEMATOLOGY ONCOLOGY | Facility: CLINIC | Age: 54
End: 2024-09-27
Payer: COMMERCIAL

## 2024-09-27 ENCOUNTER — APPOINTMENT (OUTPATIENT)
Dept: FAMILY MEDICINE | Age: 54
End: 2024-09-27

## 2024-09-27 VITALS
BODY MASS INDEX: 41.63 KG/M2 | WEIGHT: 242.5 LBS | OXYGEN SATURATION: 93 % | TEMPERATURE: 97 F | HEART RATE: 109 BPM | SYSTOLIC BLOOD PRESSURE: 121 MMHG | DIASTOLIC BLOOD PRESSURE: 82 MMHG | RESPIRATION RATE: 17 BRPM

## 2024-09-27 DIAGNOSIS — C50.911 MALIGNANT NEOPLASM OF UNSPECIFIED SITE OF RIGHT FEMALE BREAST: ICD-10-CM

## 2024-09-27 DIAGNOSIS — Z79.810 LONG TERM (CURRENT) USE OF SELECTIVE ESTROGEN RECEPTOR MODULATORS (SERMS): ICD-10-CM

## 2024-09-27 PROCEDURE — 99214 OFFICE O/P EST MOD 30 MIN: CPT

## 2024-09-27 PROCEDURE — G2211 COMPLEX E/M VISIT ADD ON: CPT

## 2024-09-29 NOTE — REASON FOR VISIT
[Follow-Up Visit] : a follow-up [Other: _____] : [unfilled] [[employer] ] : provided by [employer]   [FreeTextEntry2] : Stage 1A breast Cancer  [FreeTextEntry3] : [id number], krishna  [TWNoteComboBox1] : [place]cu[place]

## 2024-09-29 NOTE — PHYSICAL EXAM
[Fully active, able to carry on all pre-disease performance without restriction] : Status 0 - Fully active, able to carry on all pre-disease performance without restriction [Obese] : obese [Normal] : affect appropriate [de-identified] : RIGHT BREAST lumpectomy and axillary sca  and  post RT skin changes noted

## 2024-09-29 NOTE — ASSESSMENT
[Curative] : Goals of care discussed with patient: Curative [FreeTextEntry1] : Ms. GIUSEPPE WYNN is a 49 yo female with stage 1A ([id number]) invasive ductal carcinoma breast ER/RI +, HER2 neg s/p right lumpectomy and adjuvant radiation.  oncotype 16. Started on tamoxifen January 2019.    - Breast ca: She is tolerating tamoxifen very well without significant side effects. Reports good compliance. Plan to continue tamoxifen for 10 years. Will switch to AI after menopause. FSH low  3/2024,  GYN and opthal f/u annually due to risk of endometrial ca and cataracts respectively.  Up to date with Breast imaging. - BONE PAIN: . Bone scan JOSE 9/2022. pain resolved now -  Mild hot flashes: 2/2 tamoxifen.improved  Advised to wear layers and use fan prn. Consider Effexor if get worse. - Wt gain:Life style modifications, healthy diet and exercise d/w her.   - Irregular periods: 2/2 perimenopause. Annual GYN f/u.  reminded to schedule this year.She will continue to use non-hormonal contraception. Recheck FSH/estradiol prn if in menopause will switch to AI - Patient is aware of signs and symptoms of DVT/PE. Patient will report if she develops acute onset chest pain shortness of breath, leg swelling or calf pain.  -Low serum Vit D -  continue vit D suppls daily - She has HTN, well controlled. Continue ASA for CVA/VTE ppx.  - She was at [facility] with severe back pain. MRI spine 7/2024 significant arthritis. CT CAP 7/2024 JOSE. CT head/neck JOSE. Results d/w her. Rec PT and spine ortho f/u RTC 6 m.

## 2024-09-29 NOTE — ASSESSMENT
[Curative] : Goals of care discussed with patient: Curative [FreeTextEntry1] : Ms. GIUSEPPE WYNN is a 49 yo female with stage 1A ([id number]) invasive ductal carcinoma breast ER/HI +, HER2 neg s/p right lumpectomy and adjuvant radiation.  oncotype 16. Started on tamoxifen January 2019.    - Breast ca: She is tolerating tamoxifen very well without significant side effects. Reports good compliance. Plan to continue tamoxifen for 10 years. Will switch to AI after menopause. FSH low  3/2024,  GYN and opthal f/u annually due to risk of endometrial ca and cataracts respectively.  Up to date with Breast imaging. - BONE PAIN: . Bone scan JOSE 9/2022. pain resolved now -  Mild hot flashes: 2/2 tamoxifen.improved  Advised to wear layers and use fan prn. Consider Effexor if get worse. - Wt gain:Life style modifications, healthy diet and exercise d/w her.   - Irregular periods: 2/2 perimenopause. Annual GYN f/u.  reminded to schedule this year.She will continue to use non-hormonal contraception. Recheck FSH/estradiol prn if in menopause will switch to AI - Patient is aware of signs and symptoms of DVT/PE. Patient will report if she develops acute onset chest pain shortness of breath, leg swelling or calf pain.  -Low serum Vit D -  continue vit D suppls daily - She has HTN, well controlled. Continue ASA for CVA/VTE ppx.  - She was at [facility] with severe back pain. MRI spine 7/2024 significant arthritis. CT CAP 7/2024 JOSE. CT head/neck JOSE. Results d/w her. Rec PT and spine ortho f/u RTC 6 m.

## 2024-09-29 NOTE — HISTORY OF PRESENT ILLNESS
[Disease: _____________________] : Disease: [unfilled] [T: ___] : T[unfilled] [N: ___] : N[unfilled] [M: ___] : M[unfilled] [AJCC Stage: ____] : AJCC Stage: [unfilled] [de-identified] : Pt is a 46 yo female w/ pmhx HTN and DMII who initially presented with abnormal right mammogram and breast mass in 3/2018. US guided right breast core biopsy in 4/2018 showed infiltrating ductal carcinoma of 1 cm mass. She was seen by surgical oncology. She underwent right lumpectomy and Right sentinal node biopsy in 7/2018 which revealed 1.2 cm  moderately differentiated ductal carcinoma with Chester score 6/9. ER 80-90% positive, IL 20-25% positive. Her-2 was equivocal with negative FISH. 1.2 cm mass. 3 sentinel nodes were negative. Stage 1A- [id number].  OncotypeDx is 16  RT delayed 2/2 insurance issues  Completed Radiation therapy 12/11/18 tamoxifen started 1/2019 [de-identified] : moderately differentiated ductal carcinoma  [de-identified] : ER 80-90%, GA 20-25%, HER2 negative  [de-identified] : - genetic testing - 17 gene testing all came back negative. No further testing needed. [FreeTextEntry1] : tamoxifen started 1/2019 [de-identified] : Ms. GIUSEPPE WYNN is here for a follow up visit today for stage I right breast ca dx in 2018, s/p RT and on Tamoxifen since 2019. Tx care from clinic 2019  She is tolerating tamoxifen well with tolerable hot flashes. Good compliance. She denies mood changes, vaginal dryness, SOB, chest pain, leg swelling or clotting issues. Her energy and appetite is good.  Reports occassional hoy flashes but tolerable. wt stable. Life style changes to loose d/w her at every visit  She was at [facility] with severe back pain. MRI spine 2024 significant arthritis. CT CAP 2024 JOSE. CT head/neck JOSE. Results d/w her. Rec PT and spine ortho f/u LMP date 2018,  FSH/E premenopausal range 3/2024  GYN - seeing annually. TVS and BX 2024 Breast imagin2021, birads 2, 2022 BIRADS 2  7/15/2023 BIRADS 2. Overdue  Opthal: SEEING ANNUALLY  She has HTN, controlled, she does take baby ASA

## 2024-09-29 NOTE — REASON FOR VISIT
[Follow-Up Visit] : a follow-up [Other: _____] : [unfilled] [[employer] ] : provided by [employer]   [FreeTextEntry2] : Stage 1A breast Cancer  [FreeTextEntry3] : [id number], krishna  [TWNoteComboBox1] : [place] Island[place]

## 2024-09-29 NOTE — PHYSICAL EXAM
[Fully active, able to carry on all pre-disease performance without restriction] : Status 0 - Fully active, able to carry on all pre-disease performance without restriction [Obese] : obese [Normal] : affect appropriate [de-identified] : RIGHT BREAST lumpectomy and axillary sca  and  post RT skin changes noted

## 2024-09-29 NOTE — HISTORY OF PRESENT ILLNESS
[Disease: _____________________] : Disease: [unfilled] [T: ___] : T[unfilled] [N: ___] : N[unfilled] [M: ___] : M[unfilled] [AJCC Stage: ____] : AJCC Stage: [unfilled] [de-identified] : Pt is a 46 yo female w/ pmhx HTN and DMII who initially presented with abnormal right mammogram and breast mass in 3/2018. US guided right breast core biopsy in 4/2018 showed infiltrating ductal carcinoma of 1 cm mass. She was seen by surgical oncology. She underwent right lumpectomy and Right sentinal node biopsy in 7/2018 which revealed 1.2 cm  moderately differentiated ductal carcinoma with Melvin score 6/9. ER 80-90% positive, LA 20-25% positive. Her-2 was equivocal with negative FISH. 1.2 cm mass. 3 sentinel nodes were negative. Stage 1A- [id number].  OncotypeDx is 16  RT delayed 2/2 insurance issues  Completed Radiation therapy 12/11/18 tamoxifen started 1/2019 [de-identified] : moderately differentiated ductal carcinoma  [de-identified] : ER 80-90%, HI 20-25%, HER2 negative  [de-identified] : - genetic testing - 17 gene testing all came back negative. No further testing needed. [FreeTextEntry1] : tamoxifen started 1/2019 [de-identified] : Ms. GIUSEPPE WYNN is here for a follow up visit today for stage I right breast ca dx in 2018, s/p RT and on Tamoxifen since 2019. Tx care from clinic 2019  She is tolerating tamoxifen well with tolerable hot flashes. Good compliance. She denies mood changes, vaginal dryness, SOB, chest pain, leg swelling or clotting issues. Her energy and appetite is good.  Reports occassional hoy flashes but tolerable. wt stable. Life style changes to loose d/w her at every visit  She was at [facility] with severe back pain. MRI spine 2024 significant arthritis. CT CAP 2024 JOSE. CT head/neck JOSE. Results d/w her. Rec PT and spine ortho f/u LMP date 2018,  FSH/E premenopausal range 3/2024  GYN - seeing annually. TVS and BX 2024 Breast imagin2021, birads 2, 2022 BIRADS 2  7/15/2023 BIRADS 2. Overdue  Opthal: SEEING ANNUALLY  She has HTN, controlled, she does take baby ASA

## 2024-09-30 NOTE — ASU PREOP CHECKLIST - PATIENT SENT TO
[Time Spent: ___ minutes] : I have spent [unfilled] minutes of time on the encounter which excludes teaching and separately reported services.
endoscopy

## 2024-10-10 ENCOUNTER — APPOINTMENT (OUTPATIENT)
Dept: MAMMOGRAPHY | Facility: CLINIC | Age: 54
End: 2024-10-10
Payer: COMMERCIAL

## 2024-10-10 ENCOUNTER — RESULT REVIEW (OUTPATIENT)
Age: 54
End: 2024-10-10

## 2024-10-10 ENCOUNTER — OUTPATIENT (OUTPATIENT)
Dept: OUTPATIENT SERVICES | Facility: HOSPITAL | Age: 54
LOS: 1 days | End: 2024-10-10
Payer: COMMERCIAL

## 2024-10-10 ENCOUNTER — APPOINTMENT (OUTPATIENT)
Dept: ULTRASOUND IMAGING | Facility: CLINIC | Age: 54
End: 2024-10-10
Payer: COMMERCIAL

## 2024-10-10 DIAGNOSIS — C50.911 MALIGNANT NEOPLASM OF UNSPECIFIED SITE OF RIGHT FEMALE BREAST: ICD-10-CM

## 2024-10-10 DIAGNOSIS — Z98.890 OTHER SPECIFIED POSTPROCEDURAL STATES: Chronic | ICD-10-CM

## 2024-10-10 DIAGNOSIS — Z98.51 TUBAL LIGATION STATUS: Chronic | ICD-10-CM

## 2024-10-10 PROCEDURE — 77067 SCR MAMMO BI INCL CAD: CPT | Mod: 26

## 2024-10-10 PROCEDURE — 76641 ULTRASOUND BREAST COMPLETE: CPT

## 2024-10-10 PROCEDURE — 77063 BREAST TOMOSYNTHESIS BI: CPT | Mod: 26

## 2024-10-10 PROCEDURE — 76641 ULTRASOUND BREAST COMPLETE: CPT | Mod: 26,50

## 2024-10-10 PROCEDURE — 77067 SCR MAMMO BI INCL CAD: CPT

## 2024-10-10 PROCEDURE — 77063 BREAST TOMOSYNTHESIS BI: CPT

## 2024-10-11 ENCOUNTER — NON-APPOINTMENT (OUTPATIENT)
Age: 54
End: 2024-10-11

## 2024-10-11 ENCOUNTER — APPOINTMENT (OUTPATIENT)
Dept: ORTHOPEDIC SURGERY | Facility: CLINIC | Age: 54
End: 2024-10-11
Payer: COMMERCIAL

## 2024-10-11 VITALS
WEIGHT: 246 LBS | DIASTOLIC BLOOD PRESSURE: 80 MMHG | SYSTOLIC BLOOD PRESSURE: 118 MMHG | HEART RATE: 105 BPM | HEIGHT: 65 IN | BODY MASS INDEX: 40.98 KG/M2

## 2024-10-11 DIAGNOSIS — M17.0 BILATERAL PRIMARY OSTEOARTHRITIS OF KNEE: ICD-10-CM

## 2024-10-11 PROCEDURE — 99215 OFFICE O/P EST HI 40 MIN: CPT | Mod: 25

## 2024-10-11 PROCEDURE — 20610 DRAIN/INJ JOINT/BURSA W/O US: CPT | Mod: 50

## 2024-10-11 PROCEDURE — 73564 X-RAY EXAM KNEE 4 OR MORE: CPT | Mod: LT,RT

## 2024-10-11 RX ORDER — MELOXICAM 15 MG/1
15 TABLET ORAL DAILY
Qty: 30 | Refills: 1 | Status: ACTIVE | COMMUNITY
Start: 2024-10-11 | End: 1900-01-01

## 2024-11-06 NOTE — ASU PREOP CHECKLIST - TEMPERATURE IN CELSIUS (DEGREES C)
Quality 226: Preventive Care And Screening: Tobacco Use: Screening And Cessation Intervention: Patient screened for tobacco use and is an ex/non-smoker Quality 47: Advance Care Plan: Advance Care Planning discussed and documented in the medical record; patient did not wish or was not able to name a surrogate decision maker or provide an advance care plan. Detail Level: Simple 36.7

## 2024-12-03 ENCOUNTER — E-ADVICE (OUTPATIENT)
Dept: FAMILY MEDICINE | Age: 54
End: 2024-12-03

## 2025-01-09 SDOH — ECONOMIC STABILITY: GENERAL: WOULD YOU LIKE HELP WITH ANY OF THE FOLLOWING NEEDS?: I DON'T WANT HELP WITH ANY OF THESE

## 2025-01-09 SDOH — ECONOMIC STABILITY: HOUSING INSECURITY: WHAT IS YOUR LIVING SITUATION TODAY?: I HAVE A STEADY PLACE TO LIVE

## 2025-01-09 SDOH — ECONOMIC STABILITY: FOOD INSECURITY: WITHIN THE PAST 12 MONTHS, THE FOOD YOU BOUGHT JUST DIDN'T LAST AND YOU DIDN'T HAVE MONEY TO GET MORE.: NEVER TRUE

## 2025-01-09 SDOH — ECONOMIC STABILITY: HOUSING INSECURITY: DO YOU HAVE PROBLEMS WITH ANY OF THE FOLLOWING?: NONE OF THE ABOVE

## 2025-01-09 SDOH — ECONOMIC STABILITY: TRANSPORTATION INSECURITY
IN THE PAST 12 MONTHS, HAS LACK OF RELIABLE TRANSPORTATION KEPT YOU FROM MEDICAL APPOINTMENTS, MEETINGS, WORK OR FROM GETTING THINGS NEEDED FOR DAILY LIVING?: NO

## 2025-01-09 ASSESSMENT — SOCIAL DETERMINANTS OF HEALTH (SDOH): IN THE PAST 12 MONTHS, HAS THE ELECTRIC, GAS, OIL, OR WATER COMPANY THREATENED TO SHUT OFF SERVICE IN YOUR HOME?: NO

## 2025-01-11 ENCOUNTER — TELEPHONIC VISIT (OUTPATIENT)
Dept: FAMILY MEDICINE | Age: 55
End: 2025-01-11

## 2025-01-11 DIAGNOSIS — E66.811 OBESITY, CLASS I, BMI 30-34.9: ICD-10-CM

## 2025-01-11 DIAGNOSIS — K76.0 METABOLIC DYSFUNCTION-ASSOCIATED STEATOTIC LIVER DISEASE (MASLD): ICD-10-CM

## 2025-01-11 DIAGNOSIS — E88.810 METABOLIC SYNDROME: Primary | ICD-10-CM

## 2025-01-11 DIAGNOSIS — Z13.1 SCREENING FOR DIABETES MELLITUS (DM): ICD-10-CM

## 2025-01-11 DIAGNOSIS — E78.5 DYSLIPIDEMIA: ICD-10-CM

## 2025-01-11 RX ORDER — SEMAGLUTIDE 0.25 MG/.5ML
0.25 INJECTION, SOLUTION SUBCUTANEOUS
Qty: 2 ML | Refills: 0 | Status: SHIPPED | OUTPATIENT
Start: 2025-01-11

## 2025-01-11 RX ORDER — ESTRADIOL 0.03 MG/D
1 FILM, EXTENDED RELEASE TRANSDERMAL
Qty: 24 PATCH | Refills: 3 | Status: SHIPPED | OUTPATIENT
Start: 2025-01-13

## 2025-01-14 ENCOUNTER — LAB SERVICES (OUTPATIENT)
Dept: LAB | Age: 55
End: 2025-01-14

## 2025-01-14 DIAGNOSIS — E78.5 DYSLIPIDEMIA: ICD-10-CM

## 2025-01-14 DIAGNOSIS — Z13.1 SCREENING FOR DIABETES MELLITUS (DM): ICD-10-CM

## 2025-01-14 LAB
CHOLEST SERPL-MCNC: 349 MG/DL
CHOLEST/HDLC SERPL: 8.1 {RATIO}
HBA1C MFR BLD: 5.4 % (ref 4.5–5.6)
HDLC SERPL-MCNC: 43 MG/DL
LDLC SERPL CALC-MCNC: 231 MG/DL
NONHDLC SERPL-MCNC: 306 MG/DL
TRIGL SERPL-MCNC: 375 MG/DL

## 2025-01-14 PROCEDURE — 80061 LIPID PANEL: CPT | Performed by: CLINICAL MEDICAL LABORATORY

## 2025-01-14 PROCEDURE — 83036 HEMOGLOBIN GLYCOSYLATED A1C: CPT | Performed by: CLINICAL MEDICAL LABORATORY

## 2025-01-14 PROCEDURE — 36415 COLL VENOUS BLD VENIPUNCTURE: CPT | Performed by: INTERNAL MEDICINE

## 2025-01-15 ENCOUNTER — TELEPHONE (OUTPATIENT)
Dept: FAMILY MEDICINE | Age: 55
End: 2025-01-15

## 2025-01-16 ENCOUNTER — TELEPHONE (OUTPATIENT)
Dept: FAMILY MEDICINE | Age: 55
End: 2025-01-16

## 2025-01-31 ENCOUNTER — TELEPHONE (OUTPATIENT)
Dept: OTHER | Age: 55
End: 2025-01-31

## 2025-02-05 ENCOUNTER — LAB SERVICES (OUTPATIENT)
Dept: LAB | Age: 55
End: 2025-02-05

## 2025-02-05 ENCOUNTER — APPOINTMENT (OUTPATIENT)
Dept: FAMILY MEDICINE | Age: 55
End: 2025-02-05

## 2025-02-05 VITALS
TEMPERATURE: 98.4 F | DIASTOLIC BLOOD PRESSURE: 74 MMHG | SYSTOLIC BLOOD PRESSURE: 130 MMHG | WEIGHT: 160 LBS | HEART RATE: 101 BPM | BODY MASS INDEX: 32.32 KG/M2 | OXYGEN SATURATION: 96 %

## 2025-02-05 DIAGNOSIS — E66.811 CLASS 1 OBESITY WITH SERIOUS COMORBIDITY AND BODY MASS INDEX (BMI) OF 32.0 TO 32.9 IN ADULT, UNSPECIFIED OBESITY TYPE: Primary | ICD-10-CM

## 2025-02-05 DIAGNOSIS — R74.8 ELEVATED ALKALINE PHOSPHATASE LEVEL: Primary | ICD-10-CM

## 2025-02-05 DIAGNOSIS — K76.0 METABOLIC DYSFUNCTION-ASSOCIATED STEATOTIC LIVER DISEASE (MASLD): ICD-10-CM

## 2025-02-05 DIAGNOSIS — E78.5 DYSLIPIDEMIA: ICD-10-CM

## 2025-02-05 PROCEDURE — 99214 OFFICE O/P EST MOD 30 MIN: CPT | Performed by: NURSE PRACTITIONER

## 2025-02-05 RX ORDER — METFORMIN HYDROCHLORIDE 500 MG/1
500 TABLET, EXTENDED RELEASE ORAL
Qty: 90 TABLET | Refills: 1 | Status: SHIPPED | OUTPATIENT
Start: 2025-02-05

## 2025-02-05 RX ORDER — PHENTERMINE HYDROCHLORIDE 15 MG/1
15 CAPSULE ORAL EVERY MORNING
Qty: 30 CAPSULE | Refills: 2 | Status: SHIPPED | OUTPATIENT
Start: 2025-02-05

## 2025-02-05 RX ORDER — MELOXICAM 15 MG/1
TABLET ORAL
COMMUNITY
Start: 2024-12-27

## 2025-02-05 RX ORDER — SEMAGLUTIDE 0.25 MG/.5ML
0.25 INJECTION, SOLUTION SUBCUTANEOUS
Qty: 2 ML | Refills: 0 | Status: SHIPPED | OUTPATIENT
Start: 2025-02-05

## 2025-02-05 RX ORDER — ALBUTEROL SULFATE 90 UG/1
2 INHALANT RESPIRATORY (INHALATION) 4 TIMES DAILY PRN
COMMUNITY
Start: 2025-01-03

## 2025-02-05 ASSESSMENT — PATIENT HEALTH QUESTIONNAIRE - PHQ9
SUM OF ALL RESPONSES TO PHQ9 QUESTIONS 1 AND 2: 1
2. FEELING DOWN, DEPRESSED OR HOPELESS: SEVERAL DAYS
CLINICAL INTERPRETATION OF PHQ2 SCORE: NO FURTHER SCREENING NEEDED
1. LITTLE INTEREST OR PLEASURE IN DOING THINGS: NOT AT ALL
SUM OF ALL RESPONSES TO PHQ9 QUESTIONS 1 AND 2: 1

## 2025-02-06 LAB
25(OH)D3+25(OH)D2 SERPL-MCNC: 43.6 NG/ML (ref 30–100)
ALBUMIN SERPL-MCNC: 4.2 G/DL (ref 3.4–5)
ALP SERPL-CCNC: 140 UNITS/L (ref 45–117)
ALT SERPL-CCNC: 41 UNITS/L
AST SERPL-CCNC: 24 UNITS/L
BILIRUB CONJ SERPL-MCNC: 0.1 MG/DL (ref 0–0.2)
BILIRUB SERPL-MCNC: 0.3 MG/DL (ref 0.2–1)
GGT SERPL-CCNC: 45 UNITS/L (ref 5–55)
PROT SERPL-MCNC: 7.2 G/DL (ref 6.4–8.2)
TSH SERPL-ACNC: 0.65 MCUNITS/ML (ref 0.35–5)

## 2025-02-07 ENCOUNTER — E-ADVICE (OUTPATIENT)
Dept: FAMILY MEDICINE | Age: 55
End: 2025-02-07

## 2025-02-07 DIAGNOSIS — R74.8 ELEVATED ALKALINE PHOSPHATASE LEVEL: ICD-10-CM

## 2025-02-07 DIAGNOSIS — K76.0 METABOLIC DYSFUNCTION-ASSOCIATED STEATOTIC LIVER DISEASE (MASLD): Primary | ICD-10-CM

## 2025-02-11 ENCOUNTER — TELEPHONE (OUTPATIENT)
Dept: FAMILY MEDICINE | Age: 55
End: 2025-02-11

## 2025-03-28 ENCOUNTER — OUTPATIENT (OUTPATIENT)
Dept: OUTPATIENT SERVICES | Facility: HOSPITAL | Age: 55
LOS: 1 days | Discharge: HOME | End: 2025-03-28

## 2025-03-28 DIAGNOSIS — Z98.890 OTHER SPECIFIED POSTPROCEDURAL STATES: Chronic | ICD-10-CM

## 2025-03-28 DIAGNOSIS — Z98.51 TUBAL LIGATION STATUS: Chronic | ICD-10-CM

## 2025-03-31 ENCOUNTER — APPOINTMENT (OUTPATIENT)
Dept: HEMATOLOGY ONCOLOGY | Facility: CLINIC | Age: 55
End: 2025-03-31

## 2025-04-02 ENCOUNTER — NON-APPOINTMENT (OUTPATIENT)
Age: 55
End: 2025-04-02

## 2025-04-04 NOTE — REVIEW OF SYSTEMS
Veronika M [facility]    Age 62 y.o.    female    1962    N [id number]    4/14/2025  Arrival Time_____________  OR Time____________30 Min     Procedure(s):  RIGHT MIDDLE FINGER TRIGGER FINGER RELEASE                      Monitor Anesthesia Care    Surgeon(s):  Priscilla Lora, MD       Phone [phone] (home) [phone] (work)    [facility]  Date  Info Source  Home  Cell         Work  _____________________________________________________________________  _____________________________________________________________________  _____________________________________________________________________  _____________________________________________________________________  _____________________________________________________________________    PCP _____________________________ Phone_________________     H&P  ________________  Bringing      Chart              Epic      DOS      Called________  EKG ________________   Bringing      Chart              Epic      DOS      Called________  LABS________________   Bringing     Chart              Epic      DOS      Called________  Cardiac Clearance ______ Bringing      Chart              Epic      DOS      Called________  Pulmonary Clearance____ Bringing      Chart              Epic      DOS      Called________    Cardiologist________________________ Phone___________________________  Pulmonologist_______________________Phone___________________________    ? Advance Directives   ? Uatsdin concerns / Waiver on Chart            PAT Communications________________  ? Pre-op Instructions Given /Understood          _________________________________  ? Directions to Surgery Center                          _________________________________  ? Transportation Home_______________      __________________________________  ? Crutches/Walker__________________        __________________________________    Orders: Hard copy/ EPIC                 Transcribed/ EPIC               [Negative] : Heme/Lymph

## 2025-06-19 ENCOUNTER — OFFICE (OUTPATIENT)
Dept: URBAN - METROPOLITAN AREA CLINIC 102 | Facility: CLINIC | Age: 55
Setting detail: OPHTHALMOLOGY
End: 2025-06-19

## 2025-06-19 ENCOUNTER — TELEPHONE (OUTPATIENT)
Dept: FAMILY MEDICINE | Age: 55
End: 2025-06-19
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
## 2025-06-19 PROCEDURE — NO SHOW FE NO SHOW FEE: Performed by: OPHTHALMOLOGY

## 2025-06-26 ENCOUNTER — APPOINTMENT (OUTPATIENT)
Dept: FAMILY MEDICINE | Age: 55
End: 2025-06-26

## 2025-06-26 VITALS
SYSTOLIC BLOOD PRESSURE: 120 MMHG | OXYGEN SATURATION: 96 % | DIASTOLIC BLOOD PRESSURE: 78 MMHG | WEIGHT: 155.2 LBS | BODY MASS INDEX: 31.35 KG/M2 | HEART RATE: 93 BPM

## 2025-06-26 DIAGNOSIS — Z13.220 LIPID SCREENING: ICD-10-CM

## 2025-06-26 DIAGNOSIS — Z78.0 POSTMENOPAUSAL: ICD-10-CM

## 2025-06-26 DIAGNOSIS — Z13.820 SPECIAL SCREENING FOR OSTEOPOROSIS: ICD-10-CM

## 2025-06-26 DIAGNOSIS — K76.0 METABOLIC DYSFUNCTION-ASSOCIATED STEATOTIC LIVER DISEASE (MASLD): ICD-10-CM

## 2025-06-26 DIAGNOSIS — R74.8 ELEVATED ALKALINE PHOSPHATASE LEVEL: Primary | ICD-10-CM

## 2025-06-26 DIAGNOSIS — Z13.1 SCREENING FOR DIABETES MELLITUS (DM): ICD-10-CM

## 2025-06-26 PROCEDURE — 99214 OFFICE O/P EST MOD 30 MIN: CPT | Performed by: NURSE PRACTITIONER

## 2025-06-26 RX ORDER — ESTRADIOL 0.03 MG/D
1 FILM, EXTENDED RELEASE TRANSDERMAL
Qty: 24 PATCH | Refills: 3 | Status: SHIPPED | OUTPATIENT
Start: 2025-06-26

## 2025-06-26 RX ORDER — METFORMIN HYDROCHLORIDE 500 MG/1
500 TABLET, EXTENDED RELEASE ORAL
Qty: 90 TABLET | Refills: 1 | Status: SHIPPED | OUTPATIENT
Start: 2025-06-26

## 2025-06-26 RX ORDER — PHENTERMINE HYDROCHLORIDE 15 MG/1
15 CAPSULE ORAL EVERY MORNING
Qty: 30 CAPSULE | Refills: 2 | Status: SHIPPED | OUTPATIENT
Start: 2025-06-26

## 2025-08-07 RX ORDER — METFORMIN HYDROCHLORIDE 500 MG/1
500 TABLET, EXTENDED RELEASE ORAL
Qty: 90 TABLET | Refills: 1 | Status: SHIPPED | OUTPATIENT
Start: 2025-08-07

## 2025-08-08 ENCOUNTER — APPOINTMENT (OUTPATIENT)
Dept: GENERAL RADIOLOGY | Age: 55
End: 2025-08-08
Attending: NURSE PRACTITIONER

## 2025-08-13 LAB
DEXA DS2 HIP FRACTURE RISK WO PERCENT: 0.3
DEXA DS2 MAJ OST FRACTURE RISK WO PERCENT: 5.4
DEXA FRACTURE RISK HIP: 0.3
DEXA FRACTURE RISK MAJOR: 5.4
DEXA LT FEMNECK TSCORE: -1
DEXA LT FEMNECK ZSCORE: NORMAL
DEXA LT HIP FRAX: 0.3
DEXA LT MAJOR OSTEO FRAX: 5.4
DEXA LT TOTFEM TSCORE: -0.1
DEXA RT FEMNECK TSCORE: -0.9
DEXA RT TOTFEM TSCORE: 0.1
DEXA SPINE L1-L2 T-SCORE: -1.2
DEXA SPINE L1-L3 T-SCORE: -1.3
DEXA SPINE L1-L4 T-SCORE: -1.4
DEXA SPINE L1-L4 Z-SCORE: NORMAL
DEXA SPINE L2-L3 T-SCORE: -1.5
DEXA SPINE L2-L4 T-SCORE: -1.6
DEXA SPINE L3-L4 T-SCORE: -1.7

## 2025-09-29 ENCOUNTER — APPOINTMENT (OUTPATIENT)
Dept: FAMILY MEDICINE | Age: 55
End: 2025-09-29

## 2025-10-06 ENCOUNTER — APPOINTMENT (OUTPATIENT)
Dept: FAMILY MEDICINE | Age: 55
End: 2025-10-06

## 2025-10-14 ENCOUNTER — APPOINTMENT (OUTPATIENT)
Dept: ENDOCRINOLOGY | Age: 55
End: 2025-10-14
Attending: NURSE PRACTITIONER

## (undated) DEVICE — MMIS - SOLUTION AQLT 0.9% NACL 1000ML LF IRR

## (undated) DEVICE — MMIS - BASIN EMESIS 500ML MOLD GRAD GRFT PLASTIC 16OZ LF

## (undated) DEVICE — MMIS - TUBING IRR 82IN ENDOGATOR SFGRD BACK FLOW VLV COST

## (undated) DEVICE — MMIS - JELLY LUB 2OZ SRGLB BCTRST H2O SOL SCR CAP TUBE

## (undated) DEVICE — MMIS - CONNECTOR IRR ENDOGATOR AUX H2O JET TUBE OLYMPUS

## (undated) DEVICE — MMIS - TUBING SCT CLR 12FT .25IN MDVC NCDTV MALE TO MALE

## (undated) DEVICE — MMIS - TUBING IRR 2IN E SMRTCP UNV METAL CO2 CNCT 140 160

## (undated) DEVICE — MMIS - SPONGE GAUZE 4X4IN RAYON POLY 3 PLY HI ABS

## (undated) DEVICE — MMIS - PROBE ESURG 220CM 2.3MM FIAPC FLXB STRGT FIRE

## (undated) DEVICE — MMIS - CANISTER SCT 1200CC DISP MDVC GUARDIAN 90 DEGREE